# Patient Record
Sex: FEMALE | Race: WHITE | NOT HISPANIC OR LATINO | Employment: OTHER | URBAN - METROPOLITAN AREA
[De-identification: names, ages, dates, MRNs, and addresses within clinical notes are randomized per-mention and may not be internally consistent; named-entity substitution may affect disease eponyms.]

---

## 2017-03-15 RX ORDER — VERAPAMIL HYDROCHLORIDE 240 MG/1
TABLET, FILM COATED, EXTENDED RELEASE ORAL
Qty: 90 TABLET | Refills: 0 | Status: SHIPPED | OUTPATIENT
Start: 2017-03-15 | End: 2017-09-22 | Stop reason: SDUPTHER

## 2017-03-15 RX ORDER — BACLOFEN 10 MG/1
TABLET ORAL
Qty: 180 TABLET | Refills: 0 | OUTPATIENT
Start: 2017-03-15

## 2017-03-15 RX ORDER — GABAPENTIN 800 MG/1
TABLET ORAL
Qty: 30 TABLET | Refills: 0 | Status: SHIPPED | OUTPATIENT
Start: 2017-03-15 | End: 2017-04-11 | Stop reason: SDUPTHER

## 2017-03-15 RX ORDER — LEVOTHYROXINE SODIUM 88 UG/1
TABLET ORAL
Qty: 45 TABLET | Refills: 0 | Status: SHIPPED | OUTPATIENT
Start: 2017-03-15 | End: 2017-09-22 | Stop reason: SDUPTHER

## 2017-03-15 RX ORDER — BACLOFEN 10 MG/1
TABLET ORAL
Qty: 60 TABLET | Refills: 0 | Status: SHIPPED | OUTPATIENT
Start: 2017-03-15 | End: 2017-04-14 | Stop reason: SDUPTHER

## 2017-03-15 NOTE — TELEPHONE ENCOUNTER
----- Message from Elmira Almonte sent at 3/15/2017  2:11 PM EDT -----  Contact: pt - Dr Mascorro's pt - RE: appt  [facility] for pt to return call to schedule pt for appt

## 2017-04-11 ENCOUNTER — OFFICE VISIT (OUTPATIENT)
Dept: INTERNAL MEDICINE | Facility: CLINIC | Age: 77
End: 2017-04-11

## 2017-04-11 VITALS
SYSTOLIC BLOOD PRESSURE: 136 MMHG | HEIGHT: 61 IN | WEIGHT: 215 LBS | DIASTOLIC BLOOD PRESSURE: 84 MMHG | BODY MASS INDEX: 40.59 KG/M2

## 2017-04-11 DIAGNOSIS — M81.0 OSTEOPOROSIS: ICD-10-CM

## 2017-04-11 DIAGNOSIS — E03.9 ACQUIRED HYPOTHYROIDISM: ICD-10-CM

## 2017-04-11 DIAGNOSIS — F51.01 PRIMARY INSOMNIA: ICD-10-CM

## 2017-04-11 DIAGNOSIS — F39 MOOD DISORDER (HCC): ICD-10-CM

## 2017-04-11 DIAGNOSIS — E55.9 VITAMIN D DEFICIENCY: ICD-10-CM

## 2017-04-11 DIAGNOSIS — I10 ESSENTIAL HYPERTENSION: Primary | ICD-10-CM

## 2017-04-11 DIAGNOSIS — G89.29 CHRONIC RIGHT-SIDED LOW BACK PAIN WITHOUT SCIATICA: ICD-10-CM

## 2017-04-11 DIAGNOSIS — L30.9 DERMATITIS: ICD-10-CM

## 2017-04-11 DIAGNOSIS — M54.50 CHRONIC RIGHT-SIDED LOW BACK PAIN WITHOUT SCIATICA: ICD-10-CM

## 2017-04-11 PROBLEM — M54.17 LUMBOSACRAL NEURITIS: Status: ACTIVE | Noted: 2017-04-11

## 2017-04-11 PROBLEM — M51.36 DDD (DEGENERATIVE DISC DISEASE), LUMBAR: Status: ACTIVE | Noted: 2017-04-11

## 2017-04-11 PROBLEM — M48.061 LUMBAR CANAL STENOSIS: Status: ACTIVE | Noted: 2017-04-11

## 2017-04-11 PROBLEM — M51.369 DDD (DEGENERATIVE DISC DISEASE), LUMBAR: Status: ACTIVE | Noted: 2017-04-11

## 2017-04-11 PROCEDURE — 99214 OFFICE O/P EST MOD 30 MIN: CPT | Performed by: INTERNAL MEDICINE

## 2017-04-11 RX ORDER — ESCITALOPRAM OXALATE 10 MG/1
10 TABLET ORAL DAILY
Qty: 90 TABLET | Refills: 3 | Status: SHIPPED | OUTPATIENT
Start: 2017-04-11 | End: 2018-01-18 | Stop reason: SDUPTHER

## 2017-04-11 RX ORDER — GABAPENTIN 800 MG/1
800 TABLET ORAL 2 TIMES DAILY
Qty: 30 TABLET | Refills: 0 | Status: SHIPPED | OUTPATIENT
Start: 2017-04-11 | End: 2018-01-18

## 2017-04-11 RX ORDER — DESOXIMETASONE 0.5 MG/G
CREAM TOPICAL 2 TIMES DAILY
Qty: 60 BOTTLE | Refills: 2 | Status: SHIPPED | OUTPATIENT
Start: 2017-04-11 | End: 2019-05-24

## 2017-04-11 NOTE — PROGRESS NOTES
"Subjective   Dee Mac is a 76 y.o. female here for   Chief Complaint   Patient presents with   • Hypertension     1.5 year follow-up   • Hypothyroidism     1.5 year follow-up   • Hyperlipidemia     1.5 year follow-up   .    Vitals:    04/11/17 1338   BP: 136/84   BP Location: Right arm   Patient Position: Sitting   Cuff Size: Large Adult   Weight: 215 lb (97.5 kg)   Height: 61.25\" (155.6 cm)       Hypertension   This is a chronic problem. The current episode started more than 1 year ago. The problem is unchanged. The problem is controlled. Associated symptoms include malaise/fatigue and shortness of breath. Pertinent negatives include no chest pain or palpitations.   Hypothyroidism   This is a chronic problem. The current episode started more than 1 year ago. The problem occurs constantly. The problem has been unchanged. Associated symptoms include fatigue. Pertinent negatives include no abdominal pain, chest pain, chills, coughing, fever, numbness or weakness.   Hyperlipidemia   This is a chronic problem. The current episode started more than 1 year ago. The problem is controlled. Recent lipid tests were reviewed and are normal. Exacerbating diseases include hypothyroidism. She has no history of diabetes. Associated symptoms include shortness of breath. Pertinent negatives include no chest pain.   Back Pain   This is a chronic problem. The current episode started more than 1 year ago. The problem occurs constantly. The problem is unchanged. The pain is present in the lumbar spine. The quality of the pain is described as aching, stabbing and shooting. The pain does not radiate. The pain is severe. The pain is the same all the time. Pertinent negatives include no abdominal pain, bladder incontinence, bowel incontinence, chest pain, dysuria, fever, numbness, pelvic pain, tingling or weakness.        Encounter Diagnoses   Name Primary?   • Essential hypertension Yes   • Acquired hypothyroidism    • Chronic " right-sided low back pain without sciatica    • Mood disorder    • Dermatitis    • Osteoporosis    • Vitamin D deficiency    • Primary insomnia        The following portions of the patient's history were reviewed and updated as appropriate: allergies, current medications, past social history and problem list.    Review of Systems   Constitutional: Positive for fatigue and malaise/fatigue. Negative for chills and fever.   Respiratory: Positive for shortness of breath. Negative for cough and wheezing.    Cardiovascular: Negative for chest pain, palpitations and leg swelling.   Gastrointestinal: Negative for abdominal pain and bowel incontinence.   Genitourinary: Negative for bladder incontinence, dysuria and pelvic pain.   Musculoskeletal: Positive for back pain.   Neurological: Negative for tingling, weakness and numbness.   Psychiatric/Behavioral: Positive for dysphoric mood and sleep disturbance. The patient is nervous/anxious.        Objective   Physical Exam   Constitutional: She appears well-developed and well-nourished. No distress.   Cardiovascular: Normal rate, regular rhythm and normal heart sounds.    Pulmonary/Chest: No respiratory distress. She has no wheezes. She has no rales. She exhibits no tenderness.   Musculoskeletal: She exhibits no edema.   Psychiatric: She has a normal mood and affect. Her behavior is normal.   Nursing note and vitals reviewed.    Gait ok.  Decreased ROM of low back b/c pain.  Normal strength & sensation.  +dry scaly skin of scalp.    Assessment/Plan   Problem List Items Addressed This Visit        Unprioritized    Chronic right-sided low back pain without sciatica    Relevant Medications    gabapentin (NEURONTIN) 800 MG tablet    Hypothyroidism    Hypertension - Primary    Mood disorder    Relevant Medications    escitalopram (LEXAPRO) 10 MG tablet    Vitamin D deficiency    Osteoporosis    Insomnia      Other Visit Diagnoses     Dermatitis        Relevant Medications     desoximetasone (TOPICORT) 0.05 % cream       LBP - need to slowly increase gabapentin from 400mg qhs to adding 200mg in afternoon, then 200mg bid if not too tired (no driving if sleepy).  May slowly increase gabapentin to total of 1600mg daily.  Call if problems.   Fatigue from chronic pain?  Need to return for fasting labs.   Dermatitis of scalp - trial of steroid cream bid prn.   Mood worse since running out of lexapro.  Need to restart lexapro & call if sx persist.   Return 1 mo.     Discussed the need for regular f/u here!!

## 2017-04-14 RX ORDER — BACLOFEN 10 MG/1
TABLET ORAL
Qty: 60 TABLET | Refills: 0 | Status: SHIPPED | OUTPATIENT
Start: 2017-04-14 | End: 2018-01-18

## 2017-04-28 ENCOUNTER — TELEPHONE (OUTPATIENT)
Dept: INTERNAL MEDICINE | Facility: CLINIC | Age: 77
End: 2017-04-28

## 2017-04-28 DIAGNOSIS — I10 BENIGN ESSENTIAL HTN: ICD-10-CM

## 2017-04-28 DIAGNOSIS — E78.5 HYPERLIPIDEMIA, ACQUIRED: Primary | ICD-10-CM

## 2017-04-28 DIAGNOSIS — E03.9 ACQUIRED HYPOTHYROIDISM: ICD-10-CM

## 2017-09-20 ENCOUNTER — LAB (OUTPATIENT)
Dept: INTERNAL MEDICINE | Facility: CLINIC | Age: 77
End: 2017-09-20

## 2017-09-20 DIAGNOSIS — E03.9 ACQUIRED HYPOTHYROIDISM: ICD-10-CM

## 2017-09-20 DIAGNOSIS — I10 BENIGN ESSENTIAL HTN: ICD-10-CM

## 2017-09-20 DIAGNOSIS — E78.5 HYPERLIPIDEMIA, ACQUIRED: ICD-10-CM

## 2017-09-20 LAB
ALBUMIN SERPL-MCNC: 4.3 G/DL (ref 3.5–5.2)
ALBUMIN/GLOB SERPL: 1.6 G/DL
ALP SERPL-CCNC: 59 U/L (ref 39–117)
ALT SERPL-CCNC: 13 U/L (ref 1–33)
AST SERPL-CCNC: 13 U/L (ref 1–32)
BASOPHILS # BLD AUTO: 0.03 10*3/MM3 (ref 0–0.2)
BASOPHILS NFR BLD AUTO: 0.4 % (ref 0–1.5)
BILIRUB SERPL-MCNC: 0.3 MG/DL (ref 0.1–1.2)
BUN SERPL-MCNC: 25 MG/DL (ref 8–23)
BUN/CREAT SERPL: 22.7 (ref 7–25)
CALCIUM SERPL-MCNC: 10.6 MG/DL (ref 8.6–10.5)
CHLORIDE SERPL-SCNC: 103 MMOL/L (ref 98–107)
CHOLEST SERPL-MCNC: 180 MG/DL (ref 0–200)
CO2 SERPL-SCNC: 24.3 MMOL/L (ref 22–29)
CREAT SERPL-MCNC: 1.1 MG/DL (ref 0.57–1)
EOSINOPHIL # BLD AUTO: 0.2 10*3/MM3 (ref 0–0.7)
EOSINOPHIL # BLD AUTO: 2.6 % (ref 0.3–6.2)
ERYTHROCYTE [DISTWIDTH] IN BLOOD BY AUTOMATED COUNT: 14.8 % (ref 11.7–13)
GLOBULIN SER CALC-MCNC: 2.7 GM/DL
GLUCOSE SERPL-MCNC: 100 MG/DL (ref 65–99)
HCT VFR BLD AUTO: 42.8 % (ref 35.6–45.5)
HDLC SERPL-MCNC: 38 MG/DL (ref 40–60)
HGB BLD-MCNC: 13.9 G/DL (ref 11.9–15.5)
IMM GRANULOCYTES # BLD: 0.05 10*3/MM3 (ref 0–0.03)
IMM GRANULOCYTES NFR BLD: 0.6 % (ref 0–0.5)
LDLC SERPL CALC-MCNC: 116 MG/DL (ref 0–100)
LYMPHOCYTES # BLD AUTO: 1.99 10*3/MM3 (ref 0.9–4.8)
LYMPHOCYTES NFR BLD AUTO: 25.4 % (ref 19.6–45.3)
MCH RBC QN AUTO: 29.8 PG (ref 26.9–32)
MCHC RBC AUTO-ENTMCNC: 32.5 G/DL (ref 32.4–36.3)
MCV RBC AUTO: 91.6 FL (ref 80.5–98.2)
MONOCYTES # BLD AUTO: 0.63 10*3/MM3 (ref 0.2–1.2)
MONOCYTES NFR BLD AUTO: 8 % (ref 5–12)
NEUTROPHILS # BLD AUTO: 4.93 10*3/MM3 (ref 1.9–8.1)
NEUTROPHILS NFR BLD AUTO: 63 % (ref 42.7–76)
NRBC BLD AUTO-RTO: 0 /100 WBC (ref 0–0)
PLATELET # BLD AUTO: 210 10*3/MM3 (ref 140–500)
POTASSIUM SERPL-SCNC: 4.2 MMOL/L (ref 3.5–5.2)
PROT SERPL-MCNC: 7 G/DL (ref 6–8.5)
RBC # BLD AUTO: 4.67 10*6/MM3 (ref 3.9–5.2)
SODIUM SERPL-SCNC: 140 MMOL/L (ref 136–145)
TRIGL SERPL-MCNC: 129 MG/DL (ref 0–150)
TSH SERPL-ACNC: 7.21 MIU/ML (ref 0.27–4.2)
VLDLC SERPL-MCNC: 25.8 MG/DL (ref 5–40)
WBC # BLD AUTO: 7.83 10*3/MM3 (ref 4.5–10.7)

## 2017-09-22 DIAGNOSIS — E03.9 HYPOTHYROIDISM: ICD-10-CM

## 2017-09-22 RX ORDER — VALSARTAN AND HYDROCHLOROTHIAZIDE 160; 25 MG/1; MG/1
TABLET ORAL
Qty: 90 TABLET | Refills: 0 | Status: SHIPPED | OUTPATIENT
Start: 2017-09-22 | End: 2017-10-25 | Stop reason: SDUPTHER

## 2017-09-22 RX ORDER — VERAPAMIL HYDROCHLORIDE 240 MG/1
TABLET, FILM COATED, EXTENDED RELEASE ORAL
Qty: 90 TABLET | Refills: 0 | Status: SHIPPED | OUTPATIENT
Start: 2017-09-22 | End: 2018-01-18 | Stop reason: SDUPTHER

## 2017-09-22 RX ORDER — LEVOTHYROXINE SODIUM 88 UG/1
TABLET ORAL
Qty: 45 TABLET | Refills: 0 | Status: SHIPPED | OUTPATIENT
Start: 2017-09-22 | End: 2017-09-27

## 2017-09-22 RX ORDER — LEVOTHYROXINE SODIUM 0.1 MG/1
TABLET ORAL
Qty: 45 TABLET | Refills: 0 | Status: SHIPPED | OUTPATIENT
Start: 2017-09-22 | End: 2017-09-27 | Stop reason: SDUPTHER

## 2017-09-27 DIAGNOSIS — E03.9 ACQUIRED HYPOTHYROIDISM: ICD-10-CM

## 2017-09-27 RX ORDER — LEVOTHYROXINE SODIUM 0.1 MG/1
100 TABLET ORAL DAILY
Qty: 90 TABLET | Refills: 2 | Status: SHIPPED | OUTPATIENT
Start: 2017-09-27 | End: 2018-01-18 | Stop reason: SDUPTHER

## 2017-09-27 NOTE — PROGRESS NOTES
High sugar/cholesterol/fat - need low fat/sugar diet & more exercise. Abnormal kidney test - need to increase water intake & avoid ibuprofen, etc.  Only tylenol does not affect the kidneys.   Thyroid is low - if really taking thyroid pill daily (alternating 88 mcg with 100 mcg every other day), then now take 100 mcg every day.  Need recheck TSH in several mos.

## 2017-10-09 ENCOUNTER — TELEPHONE (OUTPATIENT)
Dept: INTERNAL MEDICINE | Facility: CLINIC | Age: 77
End: 2017-10-09

## 2017-10-09 DIAGNOSIS — R21 RASH: Primary | ICD-10-CM

## 2017-10-09 NOTE — TELEPHONE ENCOUNTER
----- Message from Susanne Beckett sent at 10/9/2017 11:12 AM EDT -----  Pt calling about a rash she has under her breasts and under the apron fold she has on her abdomen.  She says she has had this before and was given a cream. Pt unsure what it is called. Rx not in EPIC.   She requests the large tub.  Please Advise    Pt# [phone]    [facility] [address] AT [place] [phone] [place] [phone]

## 2017-10-09 NOTE — TELEPHONE ENCOUNTER
Patient is requesting a prescription for a rash that she currently has.    Please review message and advise.    Thank you

## 2017-10-25 RX ORDER — VALSARTAN AND HYDROCHLOROTHIAZIDE 160; 25 MG/1; MG/1
TABLET ORAL
Qty: 90 TABLET | Refills: 1 | Status: SHIPPED | OUTPATIENT
Start: 2017-10-25 | End: 2018-01-18 | Stop reason: SDUPTHER

## 2018-01-18 ENCOUNTER — OFFICE VISIT (OUTPATIENT)
Dept: INTERNAL MEDICINE | Facility: CLINIC | Age: 78
End: 2018-01-18

## 2018-01-18 VITALS
WEIGHT: 202.6 LBS | BODY MASS INDEX: 38.25 KG/M2 | OXYGEN SATURATION: 98 % | HEART RATE: 82 BPM | DIASTOLIC BLOOD PRESSURE: 90 MMHG | SYSTOLIC BLOOD PRESSURE: 150 MMHG | RESPIRATION RATE: 17 BRPM | TEMPERATURE: 98.1 F | HEIGHT: 61 IN

## 2018-01-18 DIAGNOSIS — R32 INCONTINENCE IN FEMALE: ICD-10-CM

## 2018-01-18 DIAGNOSIS — K58.0 IRRITABLE BOWEL SYNDROME WITH DIARRHEA: ICD-10-CM

## 2018-01-18 DIAGNOSIS — Z78.0 MENOPAUSE: ICD-10-CM

## 2018-01-18 DIAGNOSIS — F39 MOOD DISORDER (HCC): ICD-10-CM

## 2018-01-18 DIAGNOSIS — M81.0 OSTEOPOROSIS, UNSPECIFIED OSTEOPOROSIS TYPE, UNSPECIFIED PATHOLOGICAL FRACTURE PRESENCE: ICD-10-CM

## 2018-01-18 DIAGNOSIS — E03.9 ACQUIRED HYPOTHYROIDISM: ICD-10-CM

## 2018-01-18 DIAGNOSIS — Z00.00 MEDICARE ANNUAL WELLNESS VISIT, SUBSEQUENT: ICD-10-CM

## 2018-01-18 DIAGNOSIS — I10 ESSENTIAL HYPERTENSION: Primary | ICD-10-CM

## 2018-01-18 DIAGNOSIS — G89.29 CHRONIC RIGHT-SIDED LOW BACK PAIN WITHOUT SCIATICA: ICD-10-CM

## 2018-01-18 DIAGNOSIS — N64.4 CHRONIC BREAST PAIN: ICD-10-CM

## 2018-01-18 DIAGNOSIS — K21.9 GASTROESOPHAGEAL REFLUX DISEASE, ESOPHAGITIS PRESENCE NOT SPECIFIED: ICD-10-CM

## 2018-01-18 DIAGNOSIS — M25.551 PAIN OF RIGHT HIP JOINT: ICD-10-CM

## 2018-01-18 DIAGNOSIS — G89.29 CHRONIC BREAST PAIN: ICD-10-CM

## 2018-01-18 DIAGNOSIS — M54.50 CHRONIC RIGHT-SIDED LOW BACK PAIN WITHOUT SCIATICA: ICD-10-CM

## 2018-01-18 LAB
ALBUMIN SERPL-MCNC: 4.4 G/DL (ref 3.5–5.2)
ALBUMIN/GLOB SERPL: 1.4 G/DL
ALP SERPL-CCNC: 66 U/L (ref 39–117)
ALT SERPL-CCNC: 15 U/L (ref 1–33)
AST SERPL-CCNC: 13 U/L (ref 1–32)
BILIRUB SERPL-MCNC: 0.3 MG/DL (ref 0.1–1.2)
BUN SERPL-MCNC: 20 MG/DL (ref 8–23)
BUN/CREAT SERPL: 18.9 (ref 7–25)
CALCIUM SERPL-MCNC: 10.5 MG/DL (ref 8.6–10.5)
CHLORIDE SERPL-SCNC: 103 MMOL/L (ref 98–107)
CO2 SERPL-SCNC: 23.4 MMOL/L (ref 22–29)
CREAT SERPL-MCNC: 1.06 MG/DL (ref 0.57–1)
GLOBULIN SER CALC-MCNC: 3.2 GM/DL
GLUCOSE SERPL-MCNC: 91 MG/DL (ref 65–99)
POTASSIUM SERPL-SCNC: 4.3 MMOL/L (ref 3.5–5.2)
PROT SERPL-MCNC: 7.6 G/DL (ref 6–8.5)
SODIUM SERPL-SCNC: 140 MMOL/L (ref 136–145)
TSH SERPL-ACNC: 4.63 MIU/ML (ref 0.27–4.2)

## 2018-01-18 PROCEDURE — G0439 PPPS, SUBSEQ VISIT: HCPCS | Performed by: INTERNAL MEDICINE

## 2018-01-18 PROCEDURE — 36415 COLL VENOUS BLD VENIPUNCTURE: CPT | Performed by: INTERNAL MEDICINE

## 2018-01-18 PROCEDURE — 99214 OFFICE O/P EST MOD 30 MIN: CPT | Performed by: INTERNAL MEDICINE

## 2018-01-18 PROCEDURE — 96160 PT-FOCUSED HLTH RISK ASSMT: CPT | Performed by: INTERNAL MEDICINE

## 2018-01-18 RX ORDER — LEVOTHYROXINE SODIUM 0.1 MG/1
100 TABLET ORAL DAILY
Qty: 90 TABLET | Refills: 3 | Status: SHIPPED | OUTPATIENT
Start: 2018-01-18 | End: 2019-01-28 | Stop reason: SDUPTHER

## 2018-01-18 RX ORDER — VERAPAMIL HYDROCHLORIDE 240 MG/1
240 TABLET, FILM COATED, EXTENDED RELEASE ORAL DAILY
Qty: 90 TABLET | Refills: 3 | Status: SHIPPED | OUTPATIENT
Start: 2018-01-18 | End: 2019-12-05 | Stop reason: SDUPTHER

## 2018-01-18 RX ORDER — VALSARTAN AND HYDROCHLOROTHIAZIDE 160; 25 MG/1; MG/1
1 TABLET ORAL DAILY
Qty: 90 TABLET | Refills: 3 | Status: SHIPPED | OUTPATIENT
Start: 2018-01-18 | End: 2019-12-05 | Stop reason: SDUPTHER

## 2018-01-18 RX ORDER — RALOXIFENE HYDROCHLORIDE 60 MG/1
60 TABLET, FILM COATED ORAL DAILY
Qty: 90 TABLET | Refills: 3 | Status: SHIPPED | OUTPATIENT
Start: 2018-01-18 | End: 2019-12-05 | Stop reason: SDUPTHER

## 2018-01-18 RX ORDER — BACLOFEN 20 MG/1
10 TABLET ORAL 3 TIMES DAILY PRN
Qty: 270 TABLET | Refills: 1 | Status: SHIPPED | OUTPATIENT
Start: 2018-01-18 | End: 2019-05-24

## 2018-01-18 RX ORDER — GABAPENTIN 800 MG/1
800 TABLET ORAL DAILY
Qty: 90 TABLET | Refills: 1 | Status: SHIPPED | OUTPATIENT
Start: 2018-01-18 | End: 2018-06-22 | Stop reason: SDUPTHER

## 2018-01-18 RX ORDER — ESCITALOPRAM OXALATE 10 MG/1
TABLET ORAL
Qty: 135 TABLET | Refills: 3 | Status: SHIPPED | OUTPATIENT
Start: 2018-01-18 | End: 2019-12-05 | Stop reason: SDUPTHER

## 2018-01-18 NOTE — PATIENT INSTRUCTIONS
"  Medicare Wellness  Personal Prevention Plan of Service     Date of Office Visit:  2018  Encounter Provider:  Kira Mascorro MD  Place of Service:  [facility]  Patient Name: Dee Watts  :  1940    As part of the Medicare Wellness portion of your visit today, we are providing you with this personalized preventive plan of services (PPPS). This plan is based upon recommendations of the [place] Preventive Services Task Force (USPSTF) and the Advisory Committee on Immunization Practices (ACIP).    This lists the preventive care services that should be considered, and provides dates of when you are due. Items listed as completed are up-to-date and do not require any further intervention.    Health Maintenance   Topic Date Due   • PNEUMOCOCCAL VACCINES (65+ LOW/MEDIUM RISK) (1 of 2 - PCV13) 2003   • DXA SCAN  2017   • LIPID PANEL  2018   • MEDICARE ANNUAL WELLNESS  2019   • TDAP/TD VACCINES (2 - Td) 2022   • INFLUENZA VACCINE  Completed   • ZOSTER VACCINE  Completed       Orders Placed This Encounter   Procedures   • DEXA Bone Density Axial     Standing Status:   Future     Standing Expiration Date:   2019     Order Specific Question:   Reason for Exam:     Answer:   menopause   • Mammo Diagnostic Bilateral With CAD     Standing Status:   Future     Standing Expiration Date:   2019     Order Specific Question:   Is an Ultrasound Breast required?  If 'Yes\", Please enter an order for the US Breast as well.     Answer:   Yes     Order Specific Question:   Reason for Exam:     Answer:   pain   • US Breast Bilateral Complete     Standing Status:   Future     Standing Expiration Date:   2019     Scheduling Instructions:      [facility]     Order Specific Question:   Reason for Exam:     Answer:   breast lumps   • Comprehensive Metabolic Panel     Standing Status:   Future     Number of Occurrences:   1     Standing Expiration " Date:   1/18/2019   • TSH     Standing Status:   Future     Number of Occurrences:   1     Standing Expiration Date:   1/18/2019   • Ambulatory Referral to Orthopedic Surgery     Referral Priority:   Routine     Referral Type:   Consultation     Referral Reason:   Specialty Services Required     Referred to Provider:   Pedro yS MD     Requested Specialty:   Orthopedic Surgery     Number of Visits Requested:   1       Return in about 2 months (around 3/18/2018) for Recheck.

## 2018-01-18 NOTE — PROGRESS NOTES
QUICK REFERENCE INFORMATION:  The ABCs of the Annual Wellness Visit    Subsequent Medicare Wellness Visit    HEALTH RISK ASSESSMENT    1940    Recent Hospitalizations:  No hospitalization(s) within the last year..        Current Medical Providers:  Patient Care Team:  Kira Mascorro MD as PCP - General  Pedro Sy MD as Surgeon (Orthopedic Surgery)  Keith Dorsey MD as Surgeon (Orthopedic Surgery)  Kira Beal MD (Dermatology)        Smoking Status:  History   Smoking Status   • Former Smoker   • Quit date: 1/1/1990   Smokeless Tobacco   • Never Used       Alcohol Consumption:  History   Alcohol Use   • Yes     Comment: 1 DRINK PER WEEK       Depression Screen:   PHQ-2/PHQ-9 Depression Screening 1/18/2018   Little interest or pleasure in doing things 0   Feeling down, depressed, or hopeless 0   Trouble falling or staying asleep, or sleeping too much 0   Feeling tired or having little energy 0   Poor appetite or overeating 0   Feeling bad about yourself - or that you are a failure or have let yourself or your family down 0   Trouble concentrating on things, such as reading the newspaper or watching television 0   Moving or speaking so slowly that other people could have noticed. Or the opposite - being so fidgety or restless that you have been moving around a lot more than usual 0   Thoughts that you would be better off dead, or of hurting yourself in some way 0   Total Score 0       Health Habits and Functional and Cognitive Screening:  Functional & Cognitive Status 1/18/2018   Do you have difficulty preparing food and eating? No   Do you have difficulty bathing yourself, getting dressed or grooming yourself? No   Do you have difficulty using the toilet? No   Do you have difficulty moving around from place to place? Yes   Do you have trouble with steps or getting out of a bed or a chair? Yes   In the past year have you fallen or experienced a near fall? Yes   Do you need help using the phone?  No    Are you deaf or do you have serious difficulty hearing?  No   Do you need help with transportation? No   Do you need help shopping? No   Do you need help preparing meals?  No   Do you need help with housework?  Yes   Do you need help with laundry? Yes   Do you need help taking your medications? No   Do you need help managing money? No   Do you have difficulty concentrating, remembering or making decisions? Yes           Does the patient have evidence of cognitive impairment? No    Aspirin use counseling: Does not need ASA (and currently is not on it)      Recent Lab Results:  CMP:  Lab Results   Component Value Date     (H) 09/20/2017    BUN 25 (H) 09/20/2017    CREATININE 1.10 (H) 09/20/2017    EGFRIFNONA 48 (L) 09/20/2017    EGFRIFAFRI 58 (L) 09/20/2017    BCR 22.7 09/20/2017     09/20/2017    K 4.2 09/20/2017    CO2 24.3 09/20/2017    CALCIUM 10.6 (H) 09/20/2017    PROTENTOTREF 7.0 09/20/2017    ALBUMIN 4.30 09/20/2017    LABGLOBREF 2.7 09/20/2017    LABIL2 1.6 09/20/2017    BILITOT 0.3 09/20/2017    ALKPHOS 59 09/20/2017    AST 13 09/20/2017    ALT 13 09/20/2017     Lipid Panel:  Lab Results   Component Value Date    TRIG 129 09/20/2017    HDL 38 (L) 09/20/2017    VLDL 25.8 09/20/2017     HbA1c:       Visual Acuity:  No exam data present    Age-appropriate Screening Schedule:  Refer to the list below for future screening recommendations based on patient's age, sex and/or medical conditions. Orders for these recommended tests are listed in the plan section. The patient has been provided with a written plan.    Health Maintenance   Topic Date Due   • PNEUMOCOCCAL VACCINES (65+ LOW/MEDIUM RISK) (1 of 2 - PCV13) 06/02/2003   • DXA SCAN  04/11/2017   • INFLUENZA VACCINE  08/01/2017   • LIPID PANEL  09/20/2018   • TDAP/TD VACCINES (2 - Td) 03/27/2022   • ZOSTER VACCINE  Completed        Subjective   History of Present Illness    Dee Watts is a 77 y.o. female who presents for an Subsequent  Wellness Visit.    The following portions of the patient's history were reviewed and updated as appropriate: allergies, current medications, past family history, past medical history, past social history, past surgical history and problem list.    Outpatient Medications Prior to Visit   Medication Sig Dispense Refill   • cetirizine (ZyrTEC) 10 MG tablet Take  by mouth.     • desoximetasone (TOPICORT) 0.05 % cream Apply  topically 2 (Two) Times a Day. 60 bottle 2   • escitalopram (LEXAPRO) 10 MG tablet Take 1 tablet by mouth Daily. 90 tablet 3   • levothyroxine (SYNTHROID, LEVOTHROID) 100 MCG tablet Take 1 tablet by mouth Daily. 90 tablet 2   • nystatin-triamcinolone (MYCOLOG II) 075140-7.1 UNIT/GM-% cream Apply  topically 2 (Two) Times a Day. 60 g 1   • raloxifene (EVISTA) 60 MG tablet Take 60 mg by mouth daily.     • valsartan-hydrochlorothiazide (DIOVAN-HCT) 160-25 MG per tablet TAKE 1 TABLET BY MOUTH EVERY DAY 90 tablet 1   • verapamil SR (CALAN-SR) 240 MG CR tablet TAKE 1 TABLET BY MOUTH DAILY 90 tablet 0   • baclofen (LIORESAL) 10 MG tablet TAKE 1 TABLET BY MOUTH TWICE DAILY AS NEEDED FOR MUSCLE SPASMS 60 tablet 0   • gabapentin (NEURONTIN) 800 MG tablet Take 1 tablet by mouth 2 (Two) Times a Day. 30 tablet 0   • naproxen sodium (ALEVE) 220 MG tablet Take 220 mg by mouth 2 (two) times a day as needed for mild pain (1-3).     • valsartan (DIOVAN) 40 MG tablet Take 40 mg by mouth Daily.       No facility-administered medications prior to visit.        Patient Active Problem List   Diagnosis   • DDD (degenerative disc disease), lumbar   • Osteoarthritis of hip   • Adult body mass index greater than 30   • Lumbosacral neuritis   • Lumbar canal stenosis   • Chronic right-sided low back pain without sciatica   • Hypothyroidism   • Hypertension   • Mood disorder   • Vitamin D deficiency   • IBS (irritable bowel syndrome)   • Osteoporosis   • Migraines   • GERD (gastroesophageal reflux disease)   • Insomnia  "      Advance Care Planning:  has an advance directive - a copy has been provided and is in file    Identification of Risk Factors:  Risk factors include: weight , unhealthy diet, cardiovascular risk, inactivity, increased fall risk, chronic pain and incontinence.    Review of Systems    Compared to one year ago, the patient feels her physical health is better.  Compared to one year ago, the patient feels her mental health is better.    Objective     Physical Exam    Vitals:    01/18/18 1318   BP: 152/100   BP Location: Left arm   Patient Position: Sitting   Cuff Size: Large Adult   Pulse: 82   Resp: 17   Temp: 98.1 °F (36.7 °C)   TempSrc: Temporal Artery    SpO2: 98%   Weight: 91.9 kg (202 lb 9.6 oz)   Height: 154.3 cm (60.75\")   PainSc:   5   PainLoc: Hip  Comment: Right       Body mass index is 38.6 kg/(m^2).  Discussed the patient's BMI with her. BMI is above normal parameters. Follow-up plan includes:  exercise counseling and nutrition counseling.    Assessment/Plan   Patient Self-Management and Personalized Health Advice  The patient has been provided with information about: diet, exercise, weight management, prevention of cardiac or vascular disease, the relationship between weight and GERD and fall prevention and preventive services including:   · Bone densitometry screening, Counseling for cardiovascular disease risk reduction, Diabetes screening, see lab orders, Exercise counseling provided, Fall Risk assessment done, Fall Risk plan of care done, Influenza vaccine.    Visit Diagnoses:  No diagnosis found.    No orders of the defined types were placed in this encounter.      Outpatient Encounter Prescriptions as of 1/18/2018   Medication Sig Dispense Refill   • cetirizine (ZyrTEC) 10 MG tablet Take  by mouth.     • desoximetasone (TOPICORT) 0.05 % cream Apply  topically 2 (Two) Times a Day. 60 bottle 2   • escitalopram (LEXAPRO) 10 MG tablet Take 1 tablet by mouth Daily. 90 tablet 3   • levothyroxine " (SYNTHROID, LEVOTHROID) 100 MCG tablet Take 1 tablet by mouth Daily. 90 tablet 2   • nystatin-triamcinolone (MYCOLOG II) 914094-4.1 UNIT/GM-% cream Apply  topically 2 (Two) Times a Day. 60 g 1   • raloxifene (EVISTA) 60 MG tablet Take 60 mg by mouth daily.     • valsartan-hydrochlorothiazide (DIOVAN-HCT) 160-25 MG per tablet TAKE 1 TABLET BY MOUTH EVERY DAY 90 tablet 1   • verapamil SR (CALAN-SR) 240 MG CR tablet TAKE 1 TABLET BY MOUTH DAILY 90 tablet 0   • [DISCONTINUED] baclofen (LIORESAL) 10 MG tablet TAKE 1 TABLET BY MOUTH TWICE DAILY AS NEEDED FOR MUSCLE SPASMS 60 tablet 0   • [DISCONTINUED] gabapentin (NEURONTIN) 800 MG tablet Take 1 tablet by mouth 2 (Two) Times a Day. 30 tablet 0   • [DISCONTINUED] naproxen sodium (ALEVE) 220 MG tablet Take 220 mg by mouth 2 (two) times a day as needed for mild pain (1-3).     • [DISCONTINUED] valsartan (DIOVAN) 40 MG tablet Take 40 mg by mouth Daily.       No facility-administered encounter medications on file as of 1/18/2018.        Reviewed use of high risk medication in the elderly: yes  Reviewed for potential of harmful drug interactions in the elderly: yes    Follow Up:  No Follow-up on file.     An After Visit Summary and PPPS with all of these plans were given to the patient.

## 2018-01-24 ENCOUNTER — HOSPITAL ENCOUNTER (OUTPATIENT)
Dept: MAMMOGRAPHY | Facility: HOSPITAL | Age: 78
Discharge: HOME | End: 2018-01-24
Attending: INTERNAL MEDICINE | Admitting: INTERNAL MEDICINE

## 2018-01-24 ENCOUNTER — HOSPITAL ENCOUNTER (OUTPATIENT)
Dept: ULTRASOUND IMAGING | Facility: HOSPITAL | Age: 78
Discharge: HOME | End: 2018-01-24
Attending: INTERNAL MEDICINE

## 2018-01-24 DIAGNOSIS — N64.4 CHRONIC BREAST PAIN: ICD-10-CM

## 2018-01-24 DIAGNOSIS — G89.29 CHRONIC BREAST PAIN: ICD-10-CM

## 2018-01-24 PROCEDURE — 77066 DX MAMMO INCL CAD BI: CPT

## 2018-01-24 PROCEDURE — 76642 ULTRASOUND BREAST LIMITED: CPT

## 2018-01-31 ENCOUNTER — OFFICE VISIT (OUTPATIENT)
Dept: ORTHOPEDIC SURGERY | Facility: CLINIC | Age: 78
End: 2018-01-31

## 2018-01-31 VITALS — HEIGHT: 62 IN | BODY MASS INDEX: 37.17 KG/M2 | WEIGHT: 202 LBS

## 2018-01-31 DIAGNOSIS — M25.551 RIGHT HIP PAIN: Primary | ICD-10-CM

## 2018-01-31 DIAGNOSIS — Z96.641 STATUS POST TOTAL HIP REPLACEMENT, RIGHT: ICD-10-CM

## 2018-01-31 PROCEDURE — 99213 OFFICE O/P EST LOW 20 MIN: CPT | Performed by: ORTHOPAEDIC SURGERY

## 2018-01-31 PROCEDURE — 73502 X-RAY EXAM HIP UNI 2-3 VIEWS: CPT | Performed by: ORTHOPAEDIC SURGERY

## 2018-01-31 PROCEDURE — 20610 DRAIN/INJ JOINT/BURSA W/O US: CPT | Performed by: ORTHOPAEDIC SURGERY

## 2018-01-31 RX ADMIN — LIDOCAINE HYDROCHLORIDE 2 ML: 10 INJECTION, SOLUTION EPIDURAL; INFILTRATION; INTRACAUDAL; PERINEURAL at 13:36

## 2018-01-31 RX ADMIN — METHYLPREDNISOLONE ACETATE 160 MG: 80 INJECTION, SUSPENSION INTRA-ARTICULAR; INTRALESIONAL; INTRAMUSCULAR; SOFT TISSUE at 13:36

## 2018-02-06 ENCOUNTER — OFFICE VISIT (OUTPATIENT)
Dept: ORTHOPEDIC SURGERY | Facility: CLINIC | Age: 78
End: 2018-02-06

## 2018-02-06 VITALS — BODY MASS INDEX: 37.17 KG/M2 | HEIGHT: 62 IN | WEIGHT: 202 LBS | TEMPERATURE: 98.7 F

## 2018-02-06 DIAGNOSIS — M54.50 LUMBAR SPINE PAIN: Primary | ICD-10-CM

## 2018-02-06 DIAGNOSIS — Z98.1 HISTORY OF LUMBAR FUSION: ICD-10-CM

## 2018-02-06 PROCEDURE — 99213 OFFICE O/P EST LOW 20 MIN: CPT | Performed by: ORTHOPAEDIC SURGERY

## 2018-02-06 PROCEDURE — 72100 X-RAY EXAM L-S SPINE 2/3 VWS: CPT | Performed by: ORTHOPAEDIC SURGERY

## 2018-02-06 RX ORDER — LEVOTHYROXINE SODIUM 88 UG/1
88 TABLET ORAL DAILY
COMMUNITY
End: 2018-06-22

## 2018-02-06 NOTE — PROGRESS NOTES
New patient or new problem visit    Chief Complaint   Patient presents with   • Lumbar Spine - Establish Care       HPI: She complains of back pain occasional radiation of the right hip activity related worse with the sitting.  Years ago I performed an L4 to S1 fusion with instrumentation and she's been getting along with conservative care.  Have not seen her in 3 years.  A couple of her adult son said had recent serious health issues.    PFSH: See chart- reviewed    Review of Systems   Constitutional: Negative.  Negative for chills, fever and unexpected weight change.   HENT: Positive for congestion, ear pain, postnasal drip, sinus pain, sinus pressure, sneezing, trouble swallowing and voice change.    Eyes: Negative.  Negative for visual disturbance.   Respiratory: Positive for shortness of breath. Negative for cough.    Cardiovascular: Negative.  Negative for chest pain and leg swelling.   Gastrointestinal: Negative.  Negative for abdominal pain, nausea and vomiting.   Endocrine: Positive for cold intolerance. Negative for heat intolerance.   Genitourinary: Positive for frequency. Negative for difficulty urinating and urgency.   Musculoskeletal: Positive for back pain, gait problem, myalgias, neck pain and neck stiffness.   Skin: Negative.  Negative for rash and wound.   Allergic/Immunologic: Negative.  Negative for immunocompromised state.   Neurological: Positive for dizziness. Negative for weakness and numbness.   Hematological: Negative.  Does not bruise/bleed easily.   Psychiatric/Behavioral: Positive for sleep disturbance. Negative for dysphoric mood. The patient is nervous/anxious.        PE: Musculoskeletal:  Posture is scoliotic to coronal and sagittal inspection.  Motion appears differential.  The skin about the area is well-healed.  There is no palpable or visible deformity.  There is no local spasm.   There is some tenderness to percussion and palpation of the spine.      Neurologic:  In the bilateral  lower extremities there is no evidence of atrophy.  Motor function is undisturbed in quadriceps, EHL, and gastrocnemius      Sensation appears symmetrically intact to light touch   .  Reflexes are 2+ and symmetrical in the patellae and absent in the Achilles.  Clonus is absent..  Gait appears undisturbed.  SLR test negative      MEDICAL DECISION MAKING    XRAY: Plain film x-ray show solid fusion L4 to S1 and maintained scoliosis with no increase in deformity compared to prior films.    Other: n/a    Impression: Lumbar scoliosis.    Plan: Bony 9 we'll want to try to treat her conservatively.  I reassured her that it safe to live with this and recommended she remain active.  She's going to use a heel left I will see her back as needed

## 2018-02-08 PROBLEM — Z96.641 STATUS POST TOTAL HIP REPLACEMENT, RIGHT: Status: ACTIVE | Noted: 2018-02-08

## 2018-02-08 PROBLEM — M25.551 RIGHT HIP PAIN: Status: ACTIVE | Noted: 2018-02-08

## 2018-02-08 RX ORDER — METHYLPREDNISOLONE ACETATE 80 MG/ML
160 INJECTION, SUSPENSION INTRA-ARTICULAR; INTRALESIONAL; INTRAMUSCULAR; SOFT TISSUE
Status: COMPLETED | OUTPATIENT
Start: 2018-01-31 | End: 2018-01-31

## 2018-02-08 RX ORDER — LIDOCAINE HYDROCHLORIDE 10 MG/ML
2 INJECTION, SOLUTION EPIDURAL; INFILTRATION; INTRACAUDAL; PERINEURAL
Status: COMPLETED | OUTPATIENT
Start: 2018-01-31 | End: 2018-01-31

## 2018-02-21 ENCOUNTER — OFFICE VISIT (OUTPATIENT)
Dept: ORTHOPEDIC SURGERY | Facility: CLINIC | Age: 78
End: 2018-02-21

## 2018-02-21 VITALS — TEMPERATURE: 97.4 F | WEIGHT: 202 LBS | HEIGHT: 61 IN | BODY MASS INDEX: 38.14 KG/M2

## 2018-02-21 DIAGNOSIS — M48.062 SPINAL STENOSIS OF LUMBAR REGION WITH NEUROGENIC CLAUDICATION: ICD-10-CM

## 2018-02-21 DIAGNOSIS — M43.16 SPONDYLOLISTHESIS OF LUMBAR REGION: Primary | ICD-10-CM

## 2018-02-21 PROCEDURE — 99212 OFFICE O/P EST SF 10 MIN: CPT | Performed by: ORTHOPAEDIC SURGERY

## 2018-02-21 NOTE — PROGRESS NOTES
She complains of continued back pain but is much relieved with a heel lift which we recently prescribed.  Good strength on exam some numbness in the right side laterally in the leg.  She wants to try aqua therapy which is  Idea and the will call in a prescription for that I'll see her back as needed

## 2018-03-06 ENCOUNTER — TELEPHONE (OUTPATIENT)
Dept: ORTHOPEDIC SURGERY | Facility: CLINIC | Age: 78
End: 2018-03-06

## 2018-03-06 DIAGNOSIS — M43.16 SPONDYLOLISTHESIS OF LUMBAR REGION: Primary | ICD-10-CM

## 2018-03-06 DIAGNOSIS — M48.062 SPINAL STENOSIS OF LUMBAR REGION WITH NEUROGENIC CLAUDICATION: ICD-10-CM

## 2018-04-27 ENCOUNTER — HOSPITAL ENCOUNTER (OUTPATIENT)
Dept: PHYSICAL THERAPY | Facility: HOSPITAL | Age: 78
Setting detail: THERAPIES SERIES
Discharge: HOME | End: 2018-04-27

## 2018-04-30 ENCOUNTER — APPOINTMENT (OUTPATIENT)
Dept: PHYSICAL THERAPY | Facility: HOSPITAL | Age: 78
End: 2018-04-30

## 2018-05-01 ENCOUNTER — APPOINTMENT (OUTPATIENT)
Dept: PHYSICAL THERAPY | Facility: HOSPITAL | Age: 78
End: 2018-05-01

## 2018-05-11 ENCOUNTER — HOSPITAL ENCOUNTER (OUTPATIENT)
Dept: PHYSICAL THERAPY | Facility: HOSPITAL | Age: 78
Setting detail: THERAPIES SERIES
Discharge: HOME | End: 2018-05-11

## 2018-05-11 DIAGNOSIS — Z96.641 HISTORY OF TOTAL RIGHT HIP REPLACEMENT: ICD-10-CM

## 2018-05-11 DIAGNOSIS — R26.2 DIFFICULTY WALKING: ICD-10-CM

## 2018-05-11 DIAGNOSIS — R42 VERTIGO: ICD-10-CM

## 2018-05-11 DIAGNOSIS — Z98.1 HISTORY OF LUMBAR SPINAL FUSION: Primary | ICD-10-CM

## 2018-05-11 DIAGNOSIS — G89.29 CHRONIC MIDLINE LOW BACK PAIN WITHOUT SCIATICA: ICD-10-CM

## 2018-05-11 DIAGNOSIS — M54.50 CHRONIC MIDLINE LOW BACK PAIN WITHOUT SCIATICA: ICD-10-CM

## 2018-05-11 PROCEDURE — 97161 PT EVAL LOW COMPLEX 20 MIN: CPT | Performed by: PHYSICAL THERAPIST

## 2018-05-11 PROCEDURE — G8978 MOBILITY CURRENT STATUS: HCPCS | Performed by: PHYSICAL THERAPIST

## 2018-05-11 PROCEDURE — G8979 MOBILITY GOAL STATUS: HCPCS | Performed by: PHYSICAL THERAPIST

## 2018-05-11 NOTE — THERAPY EVALUATION
Outpatient Physical Therapy Ortho Initial Evaluation   [facility]     Patient Name: Dee Watts  : 1940  MRN: [id number]  Today's Date: 2018      Visit Date: 2018    Patient Active Problem List   Diagnosis   • DDD (degenerative disc disease), lumbar   • Osteoarthritis of hip   • Adult body mass index greater than 30   • Lumbosacral neuritis   • Lumbar canal stenosis   • Chronic right-sided low back pain without sciatica   • Hypothyroidism   • Hypertension   • Mood disorder   • Vitamin D deficiency   • IBS (irritable bowel syndrome)   • Osteoporosis   • Migraines   • GERD (gastroesophageal reflux disease)   • Insomnia   • Chronic breast pain   • Status post total hip replacement, right   • Right hip pain        Past Medical History:   Diagnosis Date   • Allergic rhinitis    • Anxiety    • Asthma    • DDD (degenerative disc disease), lumbar    • Depression    • GERD (gastroesophageal reflux disease)    • H/O bone density study 2012   • History of diverticulosis    • Hypertension    • Hypothyroidism    • IBS (irritable bowel syndrome)    • Insomnia    • Lumbar canal stenosis    • Lumbosacral neuritis    • Migraines    • Mood disorder    • Osteoarthritis    • Osteoporosis    • Thyroid goiter    • Vitamin D deficiency         Past Surgical History:   Procedure Laterality Date   • ANKLE SURGERY Left    • BACK SURGERY N/A 2005   • COLONOSCOPY N/A 2006   • HEMORRHOIDECTOMY N/A 1981   • HYSTERECTOMY N/A 1972   • PAP SMEAR N/A 10/31/2003   • THYROID CYST EXCISION      DATE NOT SPECIFIED   • WRIST SURGERY      LATERALITY AND DATE NOT SPECIFIED       Visit Dx:     ICD-10-CM ICD-9-CM   1. History of lumbar spinal fusion Z98.1 V45.4   2. Difficulty walking R26.2 719.7   3. Chronic midline low back pain without sciatica M54.5 724.2    G89.29 338.29   4. Vertigo R42 780.4   5. History of total right hip replacement Z96.641 V43.64             Patient History     Row  "Name 05/11/18 1000             History    Chief Complaint Difficulty Walking;Difficulty with daily activities;Balance Problems;Numbness;Pain;Falls/history of falls  -ZK      Type of Pain Back pain  -ZK      Date Current Problem(s) Began 01/01/18  -ZK      Brief Description of Current Complaint 77 year old with chronic LBP secondary to scoliosis and DDD compounded by her job working for [employer] [place]. Reports that her legs gave out when her \"back failed\" at age 65 and could not continue work. Pt did well s/p Lumbar fusion L4-S1 in 2005 but has always had some ongoing left leg numbness and weakness since. Reports frequent falls and 5 the past year, twice in the bathroom and more recently last Saturday when looking down during the rain caused her chronic veritigo to stir. This resulted into a fall into the bushes that saved her from serious injury. Pt uses a walker for long distances and is thinking of getting a tripod base vs. her quad cane which she feels can trip her up. No plans for VICTORIANO or surgery due to age and scar tissue thus hoping she can get better in the pool, then find an affordable place to continue water exercises. Has gotten better recently adding a heel lift to shorter L LE (due to R EVERETT and scoliosis) with spine surgeon adding to lift giving to her early this year by ortho.   -ZK      Previous treatment for THIS PROBLEM Rehabilitation   2 sessions of aquatic therapy post fusion, dates ??  -ZK      Patient/Caregiver Goals --   less pain  -ZK      Patient's Rating of General Health Fair   but balance confidence is poor  -ZK      Occupation/sports/leisure activities sedentary puzzles, cards  -ZK      What clinical tests have you had for this problem? X-ray  -ZK      Results of Clinical Tests per MD \"solid\" fusion, scoliosis. No apparent new issues.   -ZK      Are you or can you be pregnant No  -ZK         Pain     Pain Location Back  -ZK      Pain at Present 7  -ZK      Pain " at Best 3  -ZK      Pain at Worst 8  -ZK      What Performance Factors Make the Current Problem(s) WORSE? fatigue  -ZK      What Performance Factors Make the Current Problem(s) BETTER? aqua therapy in the past but allergic to chlorine pools  -ZK      Pain Comments denies LE pain  -ZK      Tolerance Time- Standing see MINDI  -ZK         Daily Activities    Primary Language English  -ZK      Barriers to learning None  -ZK      Pt Participated in POC and Goals Yes  -ZK         Safety    Are you being hurt, hit, or frightened by anyone at home or in your life? No  -ZK      Are you being neglected by a caregiver No  -ZK        User Key  (r) = Recorded By, (t) = Taken By, (c) = Cosigned By    Initials Name Provider Type    ZK Zorre Zeno Kimura, PT Physical Therapist                PT Ortho     Row Name 05/11/18 1300       Precautions and Contraindications    Precautions/Limitations fall precautions  -ZK       Posture/Observations    Alignment Options Scoliosis;Iliac crests  -ZK    Scoliosis Moderate;Severe;Increased;Standing posture   TL rib hump L side with levoscoliosis  -ZK    Posture/Observations Comments did not measure leg length today but pt happy with amount of lift in left shoe now  -ZK       General ROM    Head/Neck/Trunk --   stands in 15-20 flexion. able to come to neutral. 40 flex.  -ZK    RT Lower Ext --   good hip ROM s/p EVERETT  -ZK    GENERAL ROM COMMENTS SB to R with scoliosis but no SB L or ext past neutral.   -ZK       MMT (Manual Muscle Testing)    Additional Documentation --   4+ R LE with 4/5 L LE except 4- ant tib DF on L  -ZK       Sensation    Additional Comments states she tests normal to sensation per MDs  -ZK       Balance Skills Training    SLS unable due to fear unless holding on to me  -ZK       Transfers    Comment (Transfers) ommited 5xsts due to some vertigo after sit to supine part of eval.   -ZK       Gait/Stairs Assessment/Training    Assistive Device (Gait) cane, quad  -ZK    Comment  (Gait/Stairs) slow and cautious. will use walker for pool days due to long walk and fatigue from car.  pace 50% of norm   -ZK      User Key  (r) = Recorded By, (t) = Taken By, (c) = Cosigned By    Initials Name Provider Type    ZK Zorre Zeno Kimura, PT Physical Therapist                                  PT OP Goals     Row Name 05/11/18 1300          PT Short Term Goals    STG Date to Achieve 06/11/18  -ZK     STG 1 pt to amb safely and enter exit pool indep  -ZK     STG 1 Progress New  -ZK     STG 2 pt to tolerate 45 minutes aquatic therapy and state 4-12 hour post rx relief  -ZK     STG 2 Progress New  -ZK     STG 3 pt to note decrease in worse pain from 8 to < 6/10 after 4 weeks of rx  -ZK     STG 3 Progress New  -ZK     STG 4 MINDI to improve from 62 to < 52%   -ZK     STG 4 Progress New  -ZK     STG 5 no falls or near falls during course of PT  -ZK     STG 5 Progress New  -ZK        Long Term Goals    LTG Date to Achieve 07/11/18  -ZK     LTG 1 pt to be able to benefit from mod level ex in the pool and have a plan in place for ongoing pool exercise.   -ZK     LTG 2 pt to not have increased vertigo with pool therapy  -ZK     LTG 3 pt to be able to perform 5x sts test without vertigo and test < 15 seconds.   -ZK     LTG 4 pt to state 10-20% improvement in gait and balance confidence with aquatic therapy and home ex program.   -ZK     LTG 5 MINDI to improve to < 45% by dc  -ZK        Time Calculation    PT Goal Re-Cert Due Date 08/11/18  -ZK       User Key  (r) = Recorded By, (t) = Taken By, (c) = Cosigned By    Initials Name Provider Type    ZK Zorre Zeno Kimura, PT Physical Therapist                PT Assessment/Plan     Row Name 05/11/18 1314          PT Assessment    Functional Limitations Decreased safety during functional activities;Limitations in functional capacity and performance;Performance in leisure activities;Performance in self-care ADL;Limitations in community activities;Limitation in home  management;Impaired locomotion  -ZK     Impairments Balance;Locomotion;Impaired muscle endurance;Muscle strength;Pain  -ZK     Assessment Comments pt with moderate to severe kyphoscoliosis, left LE weakness and distal numbness, and fear of falling. Her life is also complicated with vertigo if she moves her head excessively or lays down too fast. Feels like she will be safe around the pool and has benefitted from 2 prior aquatic PT sessions in the past. Condition appears stable but prognosis is only fair at this time due to nature of long standing DDD and distal weakness. Pt does have a good attitude and is eager to get better with non surgical approach.   -ZK        PT Plan    PT Plan Comments start with 4 weeks aquatic PT then consider additional time in the pool or land based PT  -ZK       User Key  (r) = Recorded By, (t) = Taken By, (c) = Cosigned By    Initials Name Provider Type    ZK Zorre Zeno Kimura, PT Physical Therapist                              Outcome Measure Options: Modifed Owestry  Modified Oswestry  Modified Oswestry Score/Comments: 62%      Time Calculation:   Start Time: 1000  Stop Time: 1045  Time Calculation (min): 45 min  Total Timed Code Minutes- PT: 45 minute(s)     Therapy Charges for Today     Code Description Service Date Service Provider Modifiers Qty    19096781149 HC PT MOBILITY CURRENT 5/11/2018 Zorre Zeno Kimura, PT GP, CL 1    26292059078 HC PT MOBILITY PROJECTED 5/11/2018 Zorre Zeno Kimura, PT GP, CK 1    85301620833 HC PT AQUA EVAL LOW COMPLEXITY 3 5/11/2018 Zorre Zeno Kimura, PT GP 1          PT G-Codes  Outcome Measure Options: Modifed Owestry  Score: 62%  Functional Limitation: Mobility: Walking and moving around  Mobility: Walking and Moving Around Current Status (): At least 60 percent but less than 80 percent impaired, limited or restricted  Mobility: Walking and Moving Around Goal Status (): At least 40 percent but less than 60 percent impaired, limited or  restricted         Zorre Zeno Kimura, PT  5/11/2018

## 2018-05-17 ENCOUNTER — APPOINTMENT (OUTPATIENT)
Dept: PHYSICAL THERAPY | Facility: HOSPITAL | Age: 78
End: 2018-05-17

## 2018-05-22 ENCOUNTER — HOSPITAL ENCOUNTER (OUTPATIENT)
Dept: PHYSICAL THERAPY | Facility: HOSPITAL | Age: 78
Setting detail: THERAPIES SERIES
Discharge: HOME | End: 2018-05-22

## 2018-05-22 DIAGNOSIS — G89.29 CHRONIC MIDLINE LOW BACK PAIN WITHOUT SCIATICA: ICD-10-CM

## 2018-05-22 DIAGNOSIS — R26.2 DIFFICULTY WALKING: ICD-10-CM

## 2018-05-22 DIAGNOSIS — M54.50 CHRONIC MIDLINE LOW BACK PAIN WITHOUT SCIATICA: ICD-10-CM

## 2018-05-22 DIAGNOSIS — R42 VERTIGO: ICD-10-CM

## 2018-05-22 DIAGNOSIS — Z98.1 HISTORY OF LUMBAR SPINAL FUSION: Primary | ICD-10-CM

## 2018-05-22 DIAGNOSIS — Z96.641 HISTORY OF TOTAL RIGHT HIP REPLACEMENT: ICD-10-CM

## 2018-05-22 PROCEDURE — 97113 AQUATIC THERAPY/EXERCISES: CPT | Performed by: PHYSICAL THERAPIST

## 2018-05-22 NOTE — THERAPY TREATMENT NOTE
Outpatient Physical Therapy Ortho Treatment Note   [facility]     Patient Name: Dee Watts  : 1940  MRN: [id number]  Today's Date: 2018      Visit Date: 2018    Visit Dx:    ICD-10-CM ICD-9-CM   1. History of lumbar spinal fusion Z98.1 V45.4   2. Difficulty walking R26.2 719.7   3. Chronic midline low back pain without sciatica M54.5 724.2    G89.29 338.29   4. Vertigo R42 780.4   5. History of total right hip replacement Z96.641 V43.64       Patient Active Problem List   Diagnosis   • DDD (degenerative disc disease), lumbar   • Osteoarthritis of hip   • Adult body mass index greater than 30   • Lumbosacral neuritis   • Lumbar canal stenosis   • Chronic right-sided low back pain without sciatica   • Hypothyroidism   • Hypertension   • Mood disorder   • Vitamin D deficiency   • IBS (irritable bowel syndrome)   • Osteoporosis   • Migraines   • GERD (gastroesophageal reflux disease)   • Insomnia   • Chronic breast pain   • Status post total hip replacement, right   • Right hip pain        Past Medical History:   Diagnosis Date   • Allergic rhinitis    • Anxiety    • Asthma    • DDD (degenerative disc disease), lumbar    • Depression    • GERD (gastroesophageal reflux disease)    • H/O bone density study 2012   • History of diverticulosis    • Hypertension    • Hypothyroidism    • IBS (irritable bowel syndrome)    • Insomnia    • Lumbar canal stenosis    • Lumbosacral neuritis    • Migraines    • Mood disorder    • Osteoarthritis    • Osteoporosis    • Thyroid goiter    • Vitamin D deficiency         Past Surgical History:   Procedure Laterality Date   • ANKLE SURGERY Left    • BACK SURGERY N/A 2005   • COLONOSCOPY N/A 2006   • HEMORRHOIDECTOMY N/A 1981   • HYSTERECTOMY N/A 1972   • PAP SMEAR N/A 10/31/2003   • THYROID CYST EXCISION      DATE NOT SPECIFIED   • WRIST SURGERY      LATERALITY AND DATE NOT SPECIFIED                             PT  Assessment/Plan     Row Name 05/22/18 1128 05/22/18 1056       PT Assessment    Assessment Comments pt needed fitness staff help to get to locker then asked for more help after short session today. explained she needs to ask her children to help or hire an aide for pool therapy if she requires assistance. understands this and hopes to be more indep next time pending vertigo.   -ZK lots of near vertigo today with motion and visual movement of water. this may hinder participation but pt wants to push thru  -ZK      User Key  (r) = Recorded By, (t) = Taken By, (c) = Cosigned By    Initials Name Provider Type    ZK Zorre Zeno Kimura, PT Physical Therapist                    Exercises     Row Name 05/22/18 1000             Subjective Comments    Subjective Comments bothered by vertigo feeling today so couldnt look at water. will try to wear sun glasses next time. also tired from using q cane vs. walker due to rain preventing car transport.  -ZK         Subjective Pain    Able to rate subjective pain? yes  -ZK      Pre-Treatment Pain Level 2  -ZK      Post-Treatment Pain Level 2  -ZK      Subjective Pain Comment small ‘stream’ of midline LBP.  -ZK         Aquatics    Aquatics performed? Yes  -ZK         Aquatics LE    Water Walk forward;side   with railing just from bench to chest high water  -ZK      Clams standing hip circles 10  -ZK      Hip Abd/Add 10  -ZK      March in Place 10  -ZK      Toe/Heel Raises 10  -ZK      Bicycle bench 2'  -ZK      Flutter/Scissor knees bent on bench 2'  -ZK        User Key  (r) = Recorded By, (t) = Taken By, (c) = Cosigned By    Initials Name Provider Type    ZK Zorre Zeno Kimura, PT Physical Therapist                                            Time Calculation:   Start Time: 1030  Stop Time: 1100  Time Calculation (min): 30 min  Total Timed Code Minutes- PT: 30 minute(s)    Therapy Charges for Today     Code Description Service Date Service Provider Modifiers Qty    07682974456  PT  AQUATIC THERAPY EA 15 MIN 5/22/2018 Zorre Zeno Kimura, PT GP 2                    Zorre Zeno Kimura, PT  5/22/2018

## 2018-06-04 ENCOUNTER — HOSPITAL ENCOUNTER (OUTPATIENT)
Dept: PHYSICAL THERAPY | Facility: HOSPITAL | Age: 78
Setting detail: THERAPIES SERIES
Discharge: HOME | End: 2018-06-04

## 2018-06-04 DIAGNOSIS — Z96.641 HISTORY OF TOTAL RIGHT HIP REPLACEMENT: ICD-10-CM

## 2018-06-04 DIAGNOSIS — R26.2 DIFFICULTY WALKING: ICD-10-CM

## 2018-06-04 DIAGNOSIS — M54.50 CHRONIC MIDLINE LOW BACK PAIN WITHOUT SCIATICA: Primary | ICD-10-CM

## 2018-06-04 DIAGNOSIS — Z98.1 HISTORY OF LUMBAR SPINAL FUSION: ICD-10-CM

## 2018-06-04 DIAGNOSIS — R42 VERTIGO: ICD-10-CM

## 2018-06-04 DIAGNOSIS — G89.29 CHRONIC MIDLINE LOW BACK PAIN WITHOUT SCIATICA: Primary | ICD-10-CM

## 2018-06-04 PROCEDURE — 97113 AQUATIC THERAPY/EXERCISES: CPT | Performed by: PHYSICAL THERAPIST

## 2018-06-04 NOTE — THERAPY TREATMENT NOTE
Outpatient Physical Therapy Ortho Treatment Note  [facility]     Patient Name: Dee Watts  : 1940  MRN: [id number]  Today's Date: 2018      Visit Date: 2018    Visit Dx:    ICD-10-CM ICD-9-CM   1. Chronic midline low back pain without sciatica M54.5 724.2    G89.29 338.29   2. History of lumbar spinal fusion Z98.1 V45.4   3. Difficulty walking R26.2 719.7   4. Vertigo R42 780.4   5. History of total right hip replacement Z96.641 V43.64       Patient Active Problem List   Diagnosis   • DDD (degenerative disc disease), lumbar   • Osteoarthritis of hip   • Adult body mass index greater than 30   • Lumbosacral neuritis   • Lumbar canal stenosis   • Chronic right-sided low back pain without sciatica   • Hypothyroidism   • Hypertension   • Mood disorder   • Vitamin D deficiency   • IBS (irritable bowel syndrome)   • Osteoporosis   • Migraines   • GERD (gastroesophageal reflux disease)   • Insomnia   • Chronic breast pain   • Status post total hip replacement, right   • Right hip pain        Past Medical History:   Diagnosis Date   • Allergic rhinitis    • Anxiety    • Asthma    • DDD (degenerative disc disease), lumbar    • Depression    • GERD (gastroesophageal reflux disease)    • H/O bone density study 2012   • History of diverticulosis    • Hypertension    • Hypothyroidism    • IBS (irritable bowel syndrome)    • Insomnia    • Lumbar canal stenosis    • Lumbosacral neuritis    • Migraines    • Mood disorder    • Osteoarthritis    • Osteoporosis    • Thyroid goiter    • Vitamin D deficiency         Past Surgical History:   Procedure Laterality Date   • ANKLE SURGERY Left    • BACK SURGERY N/A 2005   • COLONOSCOPY N/A 2006   • HEMORRHOIDECTOMY N/A 1981   • HYSTERECTOMY N/A 1972   • PAP SMEAR N/A 10/31/2003   • THYROID CYST EXCISION      DATE NOT SPECIFIED   • WRIST SURGERY      LATERALITY AND DATE NOT SPECIFIED                             PT Assessment/Plan      Row Name 06/04/18 0946          PT Assessment    Assessment Comments Pt used rollator today, therefore did not request assist in locker room today. She reported less vertigo during session with use of sunglasses. Dee enters pool via stairs with both hands on rail, CGA and exits pool with HHA and rail. Shakey in water and requires railing support and SBA for water walking.  -CLAIRE       User Key  (r) = Recorded By, (t) = Taken By, (c) = Cosigned By    Initials Name Provider Type    CLAIRE Mayfield, PT Physical Therapist                    Exercises     Row Name 06/04/18 0900             Subjective Comments    Subjective Comments brought rollator today so that helped. Wearing sunglasses during session helped.  -CLAIRE         Subjective Pain    Able to rate subjective pain? yes  -CLAIRE      Pre-Treatment Pain Level 2  -CLAIRE      Post-Treatment Pain Level 2  -CLAIRE         Aquatics    Aquatics performed? Yes  -CLAIRE         Aquatics LE    Water Walk forward;side   At rail with SBA  -CLAIRE      Stretch 1 Hamstrings (back to wall), min assist 20 sec X 2 e.  -CLAIRE      Abdominals hands   10X  -CLAIRE      Hip Abd/Add 10X ea  -CLAIRE      March in Place 10X holding rail B UEs  -CLAIRE      Mini Squat 10X  -CLAIRE      Toe/Heel Raises 12/12 B  -CLAIRE      Bicycle bench 1 min  -CLAIRE      Flutter/Scissor 20/20  -CLAIRE        User Key  (r) = Recorded By, (t) = Taken By, (c) = Cosigned By    Initials Name Provider Type    CLAIRE Mayfield, PT Physical Therapist                                            Time Calculation:   Start Time: 0906  Stop Time: 0945  Time Calculation (min): 39 min  Total Timed Code Minutes- PT: 39 minute(s)    Therapy Charges for Today     Code Description Service Date Service Provider Modifiers Qty    21713268626  PT AQUATIC THERAPY EA 15 MIN 6/4/2018 Jay Mayfield, PT GP 3                    Jay Mayfield, PT  6/4/2018

## 2018-06-06 ENCOUNTER — HOSPITAL ENCOUNTER (OUTPATIENT)
Dept: PHYSICAL THERAPY | Facility: HOSPITAL | Age: 78
Setting detail: THERAPIES SERIES
Discharge: HOME | End: 2018-06-06

## 2018-06-06 DIAGNOSIS — R42 VERTIGO: ICD-10-CM

## 2018-06-06 DIAGNOSIS — G89.29 CHRONIC MIDLINE LOW BACK PAIN WITHOUT SCIATICA: Primary | ICD-10-CM

## 2018-06-06 DIAGNOSIS — Z98.1 HISTORY OF LUMBAR SPINAL FUSION: ICD-10-CM

## 2018-06-06 DIAGNOSIS — Z96.641 HISTORY OF TOTAL RIGHT HIP REPLACEMENT: ICD-10-CM

## 2018-06-06 DIAGNOSIS — M54.50 CHRONIC MIDLINE LOW BACK PAIN WITHOUT SCIATICA: Primary | ICD-10-CM

## 2018-06-06 DIAGNOSIS — R26.2 DIFFICULTY WALKING: ICD-10-CM

## 2018-06-06 PROCEDURE — 97113 AQUATIC THERAPY/EXERCISES: CPT | Performed by: PHYSICAL THERAPIST

## 2018-06-06 NOTE — THERAPY TREATMENT NOTE
Outpatient Physical Therapy Ortho Treatment Note  [facility]     Patient Name: Dee Watts  : 1940  MRN: [id number]  Today's Date: 2018      Visit Date: 2018    Visit Dx:    ICD-10-CM ICD-9-CM   1. Chronic midline low back pain without sciatica M54.5 724.2    G89.29 338.29   2. History of lumbar spinal fusion Z98.1 V45.4   3. Difficulty walking R26.2 719.7   4. Vertigo R42 780.4   5. History of total right hip replacement Z96.641 V43.64       Patient Active Problem List   Diagnosis   • DDD (degenerative disc disease), lumbar   • Osteoarthritis of hip   • Adult body mass index greater than 30   • Lumbosacral neuritis   • Lumbar canal stenosis   • Chronic right-sided low back pain without sciatica   • Hypothyroidism   • Hypertension   • Mood disorder   • Vitamin D deficiency   • IBS (irritable bowel syndrome)   • Osteoporosis   • Migraines   • GERD (gastroesophageal reflux disease)   • Insomnia   • Chronic breast pain   • Status post total hip replacement, right   • Right hip pain        Past Medical History:   Diagnosis Date   • Allergic rhinitis    • Anxiety    • Asthma    • DDD (degenerative disc disease), lumbar    • Depression    • GERD (gastroesophageal reflux disease)    • H/O bone density study 2012   • History of diverticulosis    • Hypertension    • Hypothyroidism    • IBS (irritable bowel syndrome)    • Insomnia    • Lumbar canal stenosis    • Lumbosacral neuritis    • Migraines    • Mood disorder    • Osteoarthritis    • Osteoporosis    • Thyroid goiter    • Vitamin D deficiency         Past Surgical History:   Procedure Laterality Date   • ANKLE SURGERY Left    • BACK SURGERY N/A 2005   • COLONOSCOPY N/A 2006   • HEMORRHOIDECTOMY N/A 1981   • HYSTERECTOMY N/A 1972   • PAP SMEAR N/A 10/31/2003   • THYROID CYST EXCISION      DATE NOT SPECIFIED   • WRIST SURGERY      LATERALITY AND DATE NOT SPECIFIED                             PT Assessment/Plan      Row Name 06/06/18 0944          PT Assessment    Assessment Comments Hanna reports significant fatigue after last appt. as well as increased LBP from taking her rollator in/out of her car. Suggested she use cane to enter facility and then use wheelchair to get to pool area.    -CLAIRE       User Key  (r) = Recorded By, (t) = Taken By, (c) = Cosigned By    Initials Name Provider Type    CLAIRE Mayfield, PT Physical Therapist                    Exercises     Row Name 06/06/18 0900             Subjective Comments    Subjective Comments Was so tired after last session, slept through dinner, only got up to go to the bathroom.  -CLAIRE         Subjective Pain    Able to rate subjective pain? yes  -CLAIRE      Pre-Treatment Pain Level 4  -CLIARE      Subjective Pain Comment My back hurt more after last treatment because I had to lift my rollator in and out of the car.  -CLAIRE         Aquatics    Aquatics performed? Yes  -CLAIRE         Aquatics LE    Water Walk forward;side   At rail with SBA  -CLAIRE      Stretch 1 Hamstrings (back to wall), min assist 15 sec X 2 e.  -CLAIRE      Stretch 2 Calf 20 sec ea  -CLAIRE      Abdominals noodle   10X Ln   -CLAIRE      Hip Abd/Add 10X ea  -CLAIRE      March in Place 10X holding rail B UEs  -CLAIRE      Mini Squat 10X  -CLAIRE      Toe/Heel Raises 10/10 B  -CLAIRE      Bicycle bench 15X  -CLAIRE      Flutter/Scissor 20/-  -CLAIRE        User Key  (r) = Recorded By, (t) = Taken By, (c) = Cosigned By    Initials Name Provider Type    CLAIRE Mayfield, PT Physical Therapist                                            Time Calculation:   Start Time: 0905  Stop Time: 0943  Time Calculation (min): 38 min  Total Timed Code Minutes- PT: 38 minute(s)    Therapy Charges for Today     Code Description Service Date Service Provider Modifiers Qty    03911412364 HC PT AQUATIC THERAPY EA 15 MIN 6/6/2018 Jay Mayfield, PT GP 3                    Jay Mayfield, PT  6/6/2018

## 2018-06-11 ENCOUNTER — HOSPITAL ENCOUNTER (OUTPATIENT)
Dept: PHYSICAL THERAPY | Facility: HOSPITAL | Age: 78
Setting detail: THERAPIES SERIES
Discharge: HOME | End: 2018-06-11

## 2018-06-11 DIAGNOSIS — G89.29 CHRONIC MIDLINE LOW BACK PAIN WITHOUT SCIATICA: Primary | ICD-10-CM

## 2018-06-11 DIAGNOSIS — R26.2 DIFFICULTY WALKING: ICD-10-CM

## 2018-06-11 DIAGNOSIS — Z96.641 HISTORY OF TOTAL RIGHT HIP REPLACEMENT: ICD-10-CM

## 2018-06-11 DIAGNOSIS — R42 VERTIGO: ICD-10-CM

## 2018-06-11 DIAGNOSIS — M54.50 CHRONIC MIDLINE LOW BACK PAIN WITHOUT SCIATICA: Primary | ICD-10-CM

## 2018-06-11 DIAGNOSIS — Z98.1 HISTORY OF LUMBAR SPINAL FUSION: ICD-10-CM

## 2018-06-11 PROCEDURE — 97113 AQUATIC THERAPY/EXERCISES: CPT | Performed by: PHYSICAL THERAPIST

## 2018-06-11 NOTE — THERAPY PROGRESS REPORT/RE-CERT
Outpatient Physical Therapy Ortho Progress Note/Recertification  [facility]     Patient Name: Dee Watts  : 1940  MRN: [id number]  Today's Date: 2018      Visit Date: 2018    Patient Active Problem List   Diagnosis   • DDD (degenerative disc disease), lumbar   • Osteoarthritis of hip   • Adult body mass index greater than 30   • Lumbosacral neuritis   • Lumbar canal stenosis   • Chronic right-sided low back pain without sciatica   • Hypothyroidism   • Hypertension   • Mood disorder   • Vitamin D deficiency   • IBS (irritable bowel syndrome)   • Osteoporosis   • Migraines   • GERD (gastroesophageal reflux disease)   • Insomnia   • Chronic breast pain   • Status post total hip replacement, right   • Right hip pain        Past Medical History:   Diagnosis Date   • Allergic rhinitis    • Anxiety    • Asthma    • DDD (degenerative disc disease), lumbar    • Depression    • GERD (gastroesophageal reflux disease)    • H/O bone density study 2012   • History of diverticulosis    • Hypertension    • Hypothyroidism    • IBS (irritable bowel syndrome)    • Insomnia    • Lumbar canal stenosis    • Lumbosacral neuritis    • Migraines    • Mood disorder    • Osteoarthritis    • Osteoporosis    • Thyroid goiter    • Vitamin D deficiency         Past Surgical History:   Procedure Laterality Date   • ANKLE SURGERY Left    • BACK SURGERY N/A 2005   • COLONOSCOPY N/A 2006   • HEMORRHOIDECTOMY N/A 1981   • HYSTERECTOMY N/A 1972   • PAP SMEAR N/A 10/31/2003   • THYROID CYST EXCISION      DATE NOT SPECIFIED   • WRIST SURGERY      LATERALITY AND DATE NOT SPECIFIED       Visit Dx:     ICD-10-CM ICD-9-CM   1. Chronic midline low back pain without sciatica M54.5 724.2    G89.29 338.29   2. History of lumbar spinal fusion Z98.1 V45.4   3. Vertigo R42 780.4   4. History of total right hip replacement Z96.641 V43.64   5. Difficulty walking R26.2 719.7                                        PT OP Goals     Row Name 06/11/18 0900          PT Short Term Goals    STG Date to Achieve 06/11/18  -CLAIRE     STG 1 pt to amb safely and enter exit pool indep  -CLAIRE     STG 1 Progress Ongoing  -CLAIRE     STG 1 Progress Comments  CGA to enter, Minimal assist to exit via stairs with railing.  -CLAIRE     STG 2 pt to tolerate 45 minutes aquatic therapy and state 4-12 hour post rx relief  -CLAIRE     STG 2 Progress Ongoing  -CLAIRE     STG 2 Progress Comments No pain relief, reports fatigue and soreness after treatments.   -CLAIRE     STG 3 pt to note decrease in worse pain from 8 to < 6/10 after 4 weeks of rx  -CLAIRE     STG 3 Progress Ongoing  -CLAIRE     STG 3 Progress Comments Has only had 3 treatments prior to today.   -CLAIRE     STG 4 MINDI to improve from 62 to < 52%   -CLAIRE     STG 4 Progress Ongoing  -CLAIRE     STG 4 Progress Comments 62%  -CLAIRE     STG 5 no falls or near falls during course of PT  -CLAIRE     STG 5 Progress Met  -CLAIRE        Long Term Goals    LTG Date to Achieve 07/11/18  -CLAIRE     LTG 1 pt to be able to benefit from mod level ex in the pool and have a plan in place for ongoing pool exercise.   -CLAIRE     LTG 1 Progress Ongoing  -CLAIRE     LTG 1 Progress Comments Would like to join [facility] if financially able.  -CLAIRE     LTG 2 pt to not have increased vertigo with pool therapy  -CLAIRE     LTG 2 Progress Met  -CLAIRE     LTG 3 pt to be able to perform 5x sts test without vertigo and test < 15 seconds.   -CLAIRE     LTG 3 Progress Progressing  -CLAIRE     LTG 3 Progress Comments No vertigo, but very unsteady, requires hand support at times and > 20 sec.  -CLAIRE     LTG 4 pt to state 10-20% improvement in gait and balance confidence with aquatic therapy and home ex program.   -CLAIRE     LTG 4 Progress Ongoing  -CLAIRE     LTG 5 MINDI to improve to < 45% by dc  -CLAIRE     LTG 5 Progress Ongoing  -CLAIRE       User Key  (r) = Recorded By, (t) = Taken By, (c) = Cosigned By    Initials Name Provider Type    CLAIRE Mayfield, PT Physical Therapist                PT  Assessment/Plan     Row Name 06/11/18 1107          PT Assessment    Functional Limitations Decreased safety during functional activities;Limitations in functional capacity and performance;Performance in leisure activities;Performance in self-care ADL;Limitations in community activities;Limitation in home management;Impaired locomotion  -CLAIRE     Impairments Balance;Locomotion;Impaired muscle endurance;Muscle strength;Pain  -CLAIRE     Assessment Comments Hanna is here for her 4th aquatic session.  She has been slow to progress and was initially limited by reports of “vertigo” that have been remedied by use of sunglasses.  She is very unsteady in the pool and requires CGA to enter pool via stairs and railing and then minimal assist to exit pool in same manner.  Back pain varies and Oswestry score is unchanged.  Hanna remains appropriate for skilled PT at this time.    -CLAIRE     Please refer to paper survey for additional self-reported information Yes  -CLAIRE     Rehab Potential Good  -CLAIRE     Patient/caregiver participated in establishment of treatment plan and goals Yes  -CLAIRE     Patient would benefit from skilled therapy intervention Yes  -CLAIRE        PT Plan    PT Frequency 2x/week  -CLAIRE     Predicted Duration of Therapy Intervention (OT Eval) one month  -CLAIRE     Planned CPT's? PT AQUATIC THERAPY EA 15 MIN: 21608  -CLAIRE     PT Plan Comments Consider transition to land based PT, especially if patient does not have financial means for a pool membership (she states she is unable to use [employer] due to problems breathing related to their chemicals).   -CLAIRE       User Key  (r) = Recorded By, (t) = Taken By, (c) = Cosigned By    Initials Name Provider Type    CLAIRE Mayfield, PT Physical Therapist                  Exercises     Row Name 06/11/18 0900             Subjective Comments    Subjective Comments Not as tired after last session. I was hoping to lose weight for my grandson’s wedding at the end of the month but that’s not  going to happen.  -CLAIRE         Subjective Pain    Able to rate subjective pain? yes  -CLAIRE      Pre-Treatment Pain Level 5  -CLAIRE      Post-Treatment Pain Level 5  -CLAIRE         Aquatics    Aquatics performed? Yes  -CLAIRE         Aquatics LE    Water Walk forward;side   At rail with SBA  -CLAIRE      Stretch 1 Hamstrings (back to wall), min assist 15 sec X 2 e.  -CLAIRE      Stretch 2 Calf 20 sec ea  -CLAIRE      Stretch Other 1 Seated LAQs X 5 ea  -CLAIRE      Abdominals noodle   10X Ln   -CLAIRE      March in Place 10X holding rail B UEs  -CLAIRE      Mini Squat 10X  -CLAIRE      Toe/Heel Raises 10/10 B  -CLAIRE      Bicycle bench 15X  -CLAIRE      Flutter/Scissor 15/15  -CLAIRE        User Key  (r) = Recorded By, (t) = Taken By, (c) = Cosigned By    Initials Name Provider Type    CLAIRE Mayfield, PT Physical Therapist                                  Time Calculation:   Start Time: 0900  Stop Time: 0945  Time Calculation (min): 45 min  Total Timed Code Minutes- PT: 40 minute(s)     Therapy Charges for Today     Code Description Service Date Service Provider Modifiers Qty    06765225868 HC PT AQUATIC THERAPY EA 15 MIN 6/11/2018 Jay Mayfield, PT GP 3                    Jay Mayfield, PT  6/11/2018

## 2018-06-13 ENCOUNTER — HOSPITAL ENCOUNTER (OUTPATIENT)
Dept: PHYSICAL THERAPY | Facility: HOSPITAL | Age: 78
Setting detail: THERAPIES SERIES
Discharge: HOME | End: 2018-06-13

## 2018-06-13 DIAGNOSIS — G89.29 CHRONIC MIDLINE LOW BACK PAIN WITHOUT SCIATICA: Primary | ICD-10-CM

## 2018-06-13 DIAGNOSIS — R26.2 DIFFICULTY WALKING: ICD-10-CM

## 2018-06-13 DIAGNOSIS — Z96.641 HISTORY OF TOTAL RIGHT HIP REPLACEMENT: ICD-10-CM

## 2018-06-13 DIAGNOSIS — Z98.1 HISTORY OF LUMBAR SPINAL FUSION: ICD-10-CM

## 2018-06-13 DIAGNOSIS — M54.50 CHRONIC MIDLINE LOW BACK PAIN WITHOUT SCIATICA: Primary | ICD-10-CM

## 2018-06-13 DIAGNOSIS — R42 VERTIGO: ICD-10-CM

## 2018-06-13 PROCEDURE — 97113 AQUATIC THERAPY/EXERCISES: CPT | Performed by: PHYSICAL THERAPIST

## 2018-06-13 NOTE — THERAPY TREATMENT NOTE
Outpatient Physical Therapy Ortho Treatment Note  [facility]     Patient Name: Dee Watts  : 1940  MRN: [id number]  Today's Date: 2018      Visit Date: 2018    Visit Dx:    ICD-10-CM ICD-9-CM   1. Chronic midline low back pain without sciatica M54.5 724.2    G89.29 338.29   2. History of lumbar spinal fusion Z98.1 V45.4   3. Vertigo R42 780.4   4. History of total right hip replacement Z96.641 V43.64   5. Difficulty walking R26.2 719.7       Patient Active Problem List   Diagnosis   • DDD (degenerative disc disease), lumbar   • Osteoarthritis of hip   • Adult body mass index greater than 30   • Lumbosacral neuritis   • Lumbar canal stenosis   • Chronic right-sided low back pain without sciatica   • Hypothyroidism   • Hypertension   • Mood disorder   • Vitamin D deficiency   • IBS (irritable bowel syndrome)   • Osteoporosis   • Migraines   • GERD (gastroesophageal reflux disease)   • Insomnia   • Chronic breast pain   • Status post total hip replacement, right   • Right hip pain        Past Medical History:   Diagnosis Date   • Allergic rhinitis    • Anxiety    • Asthma    • DDD (degenerative disc disease), lumbar    • Depression    • GERD (gastroesophageal reflux disease)    • H/O bone density study 2012   • History of diverticulosis    • Hypertension    • Hypothyroidism    • IBS (irritable bowel syndrome)    • Insomnia    • Lumbar canal stenosis    • Lumbosacral neuritis    • Migraines    • Mood disorder    • Osteoarthritis    • Osteoporosis    • Thyroid goiter    • Vitamin D deficiency         Past Surgical History:   Procedure Laterality Date   • ANKLE SURGERY Left    • BACK SURGERY N/A 2005   • COLONOSCOPY N/A 2006   • HEMORRHOIDECTOMY N/A 1981   • HYSTERECTOMY N/A 1972   • PAP SMEAR N/A 10/31/2003   • THYROID CYST EXCISION      DATE NOT SPECIFIED   • WRIST SURGERY      LATERALITY AND DATE NOT SPECIFIED                             PT  Assessment/Plan     Row Name 06/13/18 0932          PT Assessment    Assessment Comments Hanna requires assistance in pool during water therapy due to unsteadiness as well as some fear of falling. She will benefit from transition to land based PT after completing full course of aquatics to improve her strength, balance and confidence.   -CLAIRE       User Key  (r) = Recorded By, (t) = Taken By, (c) = Cosigned By    Initials Name Provider Type    CLARIE Mayfield, PT Physical Therapist                    Exercises     Row Name 06/13/18 0900             Subjective Pain    Able to rate subjective pain? yes  -CLAIRE      Pre-Treatment Pain Level 3  -CLAIRE      Post-Treatment Pain Level 3  -CLAIRE      Subjective Pain Comment Back is not too bad today.  -CLAIRE         Aquatics    Aquatics performed? Yes  -CLAIRE         Aquatics LE    Water Walk forward;side   At rail with SBA  -CLAIRE      Stretch 1 Hamstrings (back to wall), min assist 20 sec  -CLAIRE      Stretch 2 Calf 20 sec ea  -CLAIRE      Stretch 3 Shld Rolls X 8  -CLAIRE      Stretch Other 1 Seated LAQs X 5 ea  -CLAIRE      Stretch Other 2 Scap squeeze instruction X 2, 5 sec   -CLAIRE      Vertical Traction Decompression break X 1 min.  -CLAIRE      Abdominals noodle   10X Ln   -CLAIRE      Hip Abd/Add 10 x ea  -CLAIRE      March in Place 10X holding rail B UEs  -CLAIRE      Mini Squat 10X  -CLAIRE      Toe/Heel Raises 15/15 B  -CLAIRE      Bicycle bench 15X  -CLAIRE      Flutter/Scissor 15/15  -CLAIRE        User Key  (r) = Recorded By, (t) = Taken By, (c) = Cosigned By    Initials Name Provider Type    CLAIRE Mayfield, PT Physical Therapist                                            Time Calculation:   Start Time: 0906  Stop Time: 0945  Time Calculation (min): 39 min  Total Timed Code Minutes- PT: 39 minute(s)  Therapy Suggested Charges     Code   Minutes Charges    None           Therapy Charges for Today     Code Description Service Date Service Provider Modifiers Qty    99780478661 HC PT AQUATIC THERAPY EA 15 MIN 6/13/2018 Jay JOE  Chaparro, PT GP 3                    Jay Mayfield, PT  6/13/2018

## 2018-06-18 ENCOUNTER — HOSPITAL ENCOUNTER (OUTPATIENT)
Dept: PHYSICAL THERAPY | Facility: HOSPITAL | Age: 78
Setting detail: THERAPIES SERIES
Discharge: HOME | End: 2018-06-18

## 2018-06-18 DIAGNOSIS — Z96.641 HISTORY OF TOTAL RIGHT HIP REPLACEMENT: ICD-10-CM

## 2018-06-18 DIAGNOSIS — Z98.1 HISTORY OF LUMBAR SPINAL FUSION: ICD-10-CM

## 2018-06-18 DIAGNOSIS — R42 VERTIGO: ICD-10-CM

## 2018-06-18 DIAGNOSIS — G89.29 CHRONIC MIDLINE LOW BACK PAIN WITHOUT SCIATICA: Primary | ICD-10-CM

## 2018-06-18 DIAGNOSIS — R26.2 DIFFICULTY WALKING: ICD-10-CM

## 2018-06-18 DIAGNOSIS — M54.50 CHRONIC MIDLINE LOW BACK PAIN WITHOUT SCIATICA: Primary | ICD-10-CM

## 2018-06-18 PROCEDURE — 97113 AQUATIC THERAPY/EXERCISES: CPT | Performed by: PHYSICAL THERAPIST

## 2018-06-18 NOTE — THERAPY TREATMENT NOTE
Outpatient Physical Therapy Ortho Treatment Note  [facility]     Patient Name: Dee Watts  : 1940  MRN: [id number]  Today's Date: 2018      Visit Date: 2018    Visit Dx:    ICD-10-CM ICD-9-CM   1. Chronic midline low back pain without sciatica M54.5 724.2    G89.29 338.29   2. History of lumbar spinal fusion Z98.1 V45.4   3. Vertigo R42 780.4   4. History of total right hip replacement Z96.641 V43.64   5. Difficulty walking R26.2 719.7       Patient Active Problem List   Diagnosis   • DDD (degenerative disc disease), lumbar   • Osteoarthritis of hip   • Adult body mass index greater than 30   • Lumbosacral neuritis   • Lumbar canal stenosis   • Chronic right-sided low back pain without sciatica   • Hypothyroidism   • Hypertension   • Mood disorder   • Vitamin D deficiency   • IBS (irritable bowel syndrome)   • Osteoporosis   • Migraines   • GERD (gastroesophageal reflux disease)   • Insomnia   • Chronic breast pain   • Status post total hip replacement, right   • Right hip pain        Past Medical History:   Diagnosis Date   • Allergic rhinitis    • Anxiety    • Asthma    • DDD (degenerative disc disease), lumbar    • Depression    • GERD (gastroesophageal reflux disease)    • H/O bone density study 2012   • History of diverticulosis    • Hypertension    • Hypothyroidism    • IBS (irritable bowel syndrome)    • Insomnia    • Lumbar canal stenosis    • Lumbosacral neuritis    • Migraines    • Mood disorder    • Osteoarthritis    • Osteoporosis    • Thyroid goiter    • Vitamin D deficiency         Past Surgical History:   Procedure Laterality Date   • ANKLE SURGERY Left    • BACK SURGERY N/A 2005   • COLONOSCOPY N/A 2006   • HEMORRHOIDECTOMY N/A 1981   • HYSTERECTOMY N/A 1972   • PAP SMEAR N/A 10/31/2003   • THYROID CYST EXCISION      DATE NOT SPECIFIED   • WRIST SURGERY      LATERALITY AND DATE NOT SPECIFIED                             PT  Assessment/Plan     Row Name 06/18/18 0941          PT Assessment    Assessment Comments No reports of vertigo last few sessions. Still unstaedy and apprehensive in pool, requiring SBA to CGA.  Able to walk in pool holding rail with supervision now.  Enters pool with supervision and exits via staris with railing and HHA.  -CLAIRE       User Key  (r) = Recorded By, (t) = Taken By, (c) = Cosigned By    Initials Name Provider Type    CLAIRE Mayfield, PT Physical Therapist                    Exercises     Row Name 06/18/18 0900             Subjective Comments    Subjective Comments Moving slow this morning, got up a little later.   -CLAIRE         Subjective Pain    Able to rate subjective pain? yes  -CLAIRE      Pre-Treatment Pain Level 3  -CLAIRE      Post-Treatment Pain Level 3  -CLAIRE      Subjective Pain Comment Back is not too bad.  -CLAIRE         Aquatics    Aquatics performed? Yes  -CLAIRE         Aquatics LE    Water Walk forward;side;backward   At rail with SBA and supervision.  -CLAIRE      Stretch 1 Hamstrings (back to wall), min assist 20 sec  -CLAIRE      Stretch 2 Calf 20 sec ea  -CLAIRE      Stretch 3 Pelvic Smiles X 6 ea   -CLAIRE      Stretch Other 1 Seated LAQs X 5 ea  -CLAIRE      Stretch Other 2 LTR suspended X 5 ea  -CLAIRE      Vertical Traction Decompression break X 1 min.  -CLAIRE      Abdominals noodle   10X Ln   -CLAIRE      Hip Abd/Add 10 x ea  -CLAIRE      March in Place 10X holding rail B UEs  -CLAIRE      Mini Squat 10X  -CLAIRE      Toe/Heel Raises 15/15 B  -CLAIRE      Bicycle bench 25X   -CLAIRE      Flutter/Scissor 15/15  -CLAIRE        User Key  (r) = Recorded By, (t) = Taken By, (c) = Cosigned By    Initials Name Provider Type    CLAIRE Mayfield, PT Physical Therapist                                            Time Calculation:   Start Time: 0908  Stop Time: 0946  Time Calculation (min): 38 min  Total Timed Code Minutes- PT: 38 minute(s)  Therapy Suggested Charges     Code   Minutes Charges    None           Therapy Charges for Today     Code Description Service  Date Service Provider Modifiers Qty    [id number]  PT AQUATIC THERAPY EA 15 MIN 6/18/2018 Jay Mayfield, PT GP 3                    Jay Mayfield, PT  6/18/2018

## 2018-06-20 ENCOUNTER — HOSPITAL ENCOUNTER (OUTPATIENT)
Dept: PHYSICAL THERAPY | Facility: HOSPITAL | Age: 78
Setting detail: THERAPIES SERIES
Discharge: HOME | End: 2018-06-20

## 2018-06-20 DIAGNOSIS — R42 VERTIGO: ICD-10-CM

## 2018-06-20 DIAGNOSIS — G89.29 CHRONIC MIDLINE LOW BACK PAIN WITHOUT SCIATICA: Primary | ICD-10-CM

## 2018-06-20 DIAGNOSIS — M54.50 CHRONIC MIDLINE LOW BACK PAIN WITHOUT SCIATICA: Primary | ICD-10-CM

## 2018-06-20 DIAGNOSIS — Z98.1 HISTORY OF LUMBAR SPINAL FUSION: ICD-10-CM

## 2018-06-20 DIAGNOSIS — Z96.641 HISTORY OF TOTAL RIGHT HIP REPLACEMENT: ICD-10-CM

## 2018-06-20 DIAGNOSIS — R26.2 DIFFICULTY WALKING: ICD-10-CM

## 2018-06-20 PROCEDURE — 97113 AQUATIC THERAPY/EXERCISES: CPT | Performed by: PHYSICAL THERAPIST

## 2018-06-20 NOTE — THERAPY TREATMENT NOTE
Outpatient Physical Therapy Ortho Treatment Note  [facility]     Patient Name: Dee Watts  : 1940  MRN: [id number]  Today's Date: 2018      Visit Date: 2018    Visit Dx:    ICD-10-CM ICD-9-CM   1. Chronic midline low back pain without sciatica M54.5 724.2    G89.29 338.29   2. History of lumbar spinal fusion Z98.1 V45.4   3. Vertigo R42 780.4   4. History of total right hip replacement Z96.641 V43.64   5. Difficulty walking R26.2 719.7       Patient Active Problem List   Diagnosis   • DDD (degenerative disc disease), lumbar   • Osteoarthritis of hip   • Adult body mass index greater than 30   • Lumbosacral neuritis   • Lumbar canal stenosis   • Chronic right-sided low back pain without sciatica   • Hypothyroidism   • Hypertension   • Mood disorder   • Vitamin D deficiency   • IBS (irritable bowel syndrome)   • Osteoporosis   • Migraines   • GERD (gastroesophageal reflux disease)   • Insomnia   • Chronic breast pain   • Status post total hip replacement, right   • Right hip pain        Past Medical History:   Diagnosis Date   • Allergic rhinitis    • Anxiety    • Asthma    • DDD (degenerative disc disease), lumbar    • Depression    • GERD (gastroesophageal reflux disease)    • H/O bone density study 2012   • History of diverticulosis    • Hypertension    • Hypothyroidism    • IBS (irritable bowel syndrome)    • Insomnia    • Lumbar canal stenosis    • Lumbosacral neuritis    • Migraines    • Mood disorder    • Osteoarthritis    • Osteoporosis    • Thyroid goiter    • Vitamin D deficiency         Past Surgical History:   Procedure Laterality Date   • ANKLE SURGERY Left    • BACK SURGERY N/A 2005   • COLONOSCOPY N/A 2006   • HEMORRHOIDECTOMY N/A 1981   • HYSTERECTOMY N/A 1972   • PAP SMEAR N/A 10/31/2003   • THYROID CYST EXCISION      DATE NOT SPECIFIED   • WRIST SURGERY      LATERALITY AND DATE NOT SPECIFIED                             PT  Assessment/Plan     Row Name 06/20/18 0914          PT Assessment    Assessment Comments Increased back pain today. Spent more time with decompression which helped decrease her pain. She did have one spasm during session which also limited her exercise.  -CLAIRE       User Key  (r) = Recorded By, (t) = Taken By, (c) = Cosigned By    Initials Name Provider Type    CLAIRE Mayfield, PT Physical Therapist                    Exercises     Row Name 06/20/18 0900             Subjective Pain    Able to rate subjective pain? yes  -CLAIRE      Pre-Treatment Pain Level 7  -CLAIRE      Post-Treatment Pain Level 5  -CLAIRE      Subjective Pain Comment My back is really bothering me today, pain is 6-7/10.  -CLAIRE         Aquatics    Aquatics performed? Yes  -CLAIRE         Aquatics LE    Water Walk forward;side   At rail with supervision.  -CLAIRE      Stretch 1 Hamstrings (back to wall), min assist 20 sec  -CLAIRE      Stretch 2 --  -CLAIRE      Stretch 3 Pelvic Smiles X 3 ea   -CLAIRE      Stretch Other 1 Seated LAQs X 5 ea  -CLAIRE      Stretch Other 2 --  -CLAIRE      Vertical Traction Decompression break  several times throuout session  -CLAIRE      Abdominals --  -CLAIRE      Clams 10X suspendd  -CLAIRE      Hip Abd/Add --  -CLAIRE      March in Place 12X holding rail B UEs  -CLAIRE      Mini Squat --  -CLAIRE      Toe/Heel Raises --  -CLAIRE      Bicycle bench 25X   -CLAIRE      Flutter/Scissor 15/15  -CLAIRE        User Key  (r) = Recorded By, (t) = Taken By, (c) = Cosigned By    Initials Name Provider Type    CLAIRE Mayfield, PT Physical Therapist                             Therapy Education  Given: Symptoms/condition management (Cold pack)  Program: Reinforced  How Provided: Verbal, Demonstration  Provided to: Patient  Level of Understanding: Teach back education performed              Time Calculation:   Start Time: 0905  Stop Time: 0945  Time Calculation (min): 40 min  Total Timed Code Minutes- PT: 40 minute(s)  Therapy Suggested Charges     Code   Minutes Charges    None           Therapy  Charges for Today     Code Description Service Date Service Provider Modifiers Qty    94797223389 HC PT AQUATIC THERAPY EA 15 MIN 6/20/2018 Jay Mayfield, PT GP 3                    Jay Mayfield, PT  6/20/2018

## 2018-06-22 DIAGNOSIS — I10 ESSENTIAL HYPERTENSION: ICD-10-CM

## 2018-06-22 DIAGNOSIS — G89.29 CHRONIC RIGHT-SIDED LOW BACK PAIN WITHOUT SCIATICA: ICD-10-CM

## 2018-06-22 DIAGNOSIS — M54.50 CHRONIC RIGHT-SIDED LOW BACK PAIN WITHOUT SCIATICA: ICD-10-CM

## 2018-06-22 DIAGNOSIS — E03.9 ACQUIRED HYPOTHYROIDISM: ICD-10-CM

## 2018-06-22 RX ORDER — GABAPENTIN 800 MG/1
800 TABLET ORAL DAILY
Qty: 90 TABLET | Refills: 0 | OUTPATIENT
Start: 2018-06-22 | End: 2018-10-03 | Stop reason: SDUPTHER

## 2018-06-22 RX ORDER — LEVOTHYROXINE SODIUM 88 UG/1
88 TABLET ORAL EVERY OTHER DAY
Qty: 45 TABLET | Refills: 2 | Status: SHIPPED | OUTPATIENT
Start: 2018-06-22 | End: 2019-08-06 | Stop reason: SDUPTHER

## 2018-06-22 NOTE — TELEPHONE ENCOUNTER
----- Message from Carline Goins sent at 6/21/2018 12:17 PM EDT -----  Contact: Patient  Patient requesting refill on      gabapentin (NEURONTIN) 800 MG tablet  levothyroxine (SYNTHROID, LEVOTHROID) 88 MCG tablet  verapamil SR (CALAN-SR) 240 MG CR tablet    Patient:[phone]    Pharmacy:[facility] [address] [place] [address] AT [facility] [phone] [facility] [phone]

## 2018-06-22 NOTE — TELEPHONE ENCOUNTER
I spoke to the pharmacist at [facility] in [place] and he stated Ms. Watts has refills on file for Verapamil 240, and Levothyroxine 100 mcg.    When she come in today to  her refills on her Gabepentin and Levothyroxine 88 mcg. She can request to transfer the remaining of her prescriptions from the [facility] on [address] to the [facility] in [place].    I have informed Ms. Watts of this.    She stated she takes both 100 mcg and 88 mcg. She alternates every other day.

## 2018-07-03 ENCOUNTER — HOSPITAL ENCOUNTER (OUTPATIENT)
Dept: PHYSICAL THERAPY | Facility: HOSPITAL | Age: 78
Setting detail: THERAPIES SERIES
Discharge: HOME | End: 2018-07-03

## 2018-07-10 ENCOUNTER — HOSPITAL ENCOUNTER (OUTPATIENT)
Dept: PHYSICAL THERAPY | Facility: HOSPITAL | Age: 78
Setting detail: THERAPIES SERIES
Discharge: HOME | End: 2018-07-10

## 2018-07-10 DIAGNOSIS — R42 VERTIGO: ICD-10-CM

## 2018-07-10 DIAGNOSIS — M54.50 CHRONIC MIDLINE LOW BACK PAIN WITHOUT SCIATICA: Primary | ICD-10-CM

## 2018-07-10 DIAGNOSIS — Z96.641 HISTORY OF TOTAL RIGHT HIP REPLACEMENT: ICD-10-CM

## 2018-07-10 DIAGNOSIS — G89.29 CHRONIC MIDLINE LOW BACK PAIN WITHOUT SCIATICA: Primary | ICD-10-CM

## 2018-07-10 DIAGNOSIS — Z98.1 HISTORY OF LUMBAR SPINAL FUSION: ICD-10-CM

## 2018-07-10 DIAGNOSIS — R26.2 DIFFICULTY WALKING: ICD-10-CM

## 2018-07-10 PROCEDURE — 97113 AQUATIC THERAPY/EXERCISES: CPT | Performed by: PHYSICAL THERAPIST

## 2018-07-10 NOTE — THERAPY TREATMENT NOTE
Outpatient Physical Therapy Ortho Progress Note/Recertification  [facility]     Patient Name: Dee Watts  : 1940  MRN: [id number]  Today's Date: 7/10/2018      Visit Date: 07/10/2018    Patient Active Problem List   Diagnosis   • DDD (degenerative disc disease), lumbar   • Osteoarthritis of hip   • Adult body mass index greater than 30   • Lumbosacral neuritis   • Lumbar canal stenosis   • Chronic right-sided low back pain without sciatica   • Hypothyroidism   • Hypertension   • Mood disorder (CMS/HCC)   • Vitamin D deficiency   • IBS (irritable bowel syndrome)   • Osteoporosis   • Migraines   • GERD (gastroesophageal reflux disease)   • Insomnia   • Chronic breast pain   • Status post total hip replacement, right   • Right hip pain        Past Medical History:   Diagnosis Date   • Allergic rhinitis    • Anxiety    • Asthma    • DDD (degenerative disc disease), lumbar    • Depression    • GERD (gastroesophageal reflux disease)    • H/O bone density study 2012   • History of diverticulosis    • Hypertension    • Hypothyroidism    • IBS (irritable bowel syndrome)    • Insomnia    • Lumbar canal stenosis    • Lumbosacral neuritis    • Migraines    • Mood disorder (CMS/HCC)    • Osteoarthritis    • Osteoporosis    • Thyroid goiter    • Vitamin D deficiency         Past Surgical History:   Procedure Laterality Date   • ANKLE SURGERY Left    • BACK SURGERY N/A 2005   • COLONOSCOPY N/A 2006   • HEMORRHOIDECTOMY N/A 1981   • HYSTERECTOMY N/A 1972   • PAP SMEAR N/A 10/31/2003   • THYROID CYST EXCISION      DATE NOT SPECIFIED   • WRIST SURGERY      LATERALITY AND DATE NOT SPECIFIED       Visit Dx:     ICD-10-CM ICD-9-CM   1. Chronic midline low back pain without sciatica M54.5 724.2    G89.29 338.29   2. History of lumbar spinal fusion Z98.1 V45.4   3. Vertigo R42 780.4   4. History of total right hip replacement Z96.641 V43.64   5. Difficulty walking R26.2 719.7                                        PT OP Goals     Row Name 07/10/18 0900          PT Short Term Goals    STG Date to Achieve 06/11/18  -CLAIRE     STG 1 pt to amb safely and enter exit pool indep  -CLAIRE     STG 1 Progress Partially Met  -CLAIRE     STG 1 Progress Comments Enters pool independently, however exiting required HHA since lapse in Pt for 3 weeks.    -CLAIRE     STG 2 pt to tolerate 45 minutes aquatic therapy and state 4-12 hour post rx relief  -CLAIRE     STG 2 Progress Ongoing  -CLAIRE     STG 2 Progress Comments Pt rports feeling better for about 2 hours after PT.  -CLAIRE     STG 3 pt to note decrease in worse pain from 8 to < 6/10 after 4 weeks of rx  -CLAIRE     STG 3 Progress Ongoing  -CLAIRE     STG 4 MINDI to improve from 62 to < 52%   -CLAIRE     STG 4 Progress Ongoing  -CLAIRE     STG 4 Progress Comments 60%  -CLAIRE     STG 5 no falls or near falls during course of PT  -CLAIRE     STG 5 Progress Met  -CLAIRE        Long Term Goals    LTG Date to Achieve 07/11/18  -CLAIRE     LTG 1 pt to be able to benefit from mod level ex in the pool and have a plan in place for ongoing pool exercise.   -CLAIRE     LTG 1 Progress Met  -CLAIRE     LTG 1 Progress Comments [facility].   -CLAIRE     LTG 2 pt to not have increased vertigo with pool therapy  -CLAIRE     LTG 2 Progress Met  -CLAIRE     LTG 3 pt to be able to perform 5x sts test without vertigo and test < 15 seconds.   -CLAIRE     LTG 3 Progress Ongoing  -CLAIRE     LTG 3 Progress Comments 26.2 sec. no UE assist  -CLAIRE     LTG 4 pt to state 10-20% improvement in gait and balance confidence with aquatic therapy and home ex program.   -CLAIRE     LTG 4 Progress Partially Met  -CLAIRE     LTG 4 Progress Comments 10% improvement in walking, however not balance.  -CLAIRE     LTG 5 MINDI to improve to < 45% by dc  -CLAIRE     LTG 5 Progress Ongoing  -CLAIRE       User Key  (r) = Recorded By, (t) = Taken By, (c) = Cosigned By    Initials Name Provider Type    CLAIRE Mayfield, PT Physical Therapist                PT  Assessment/Plan     Row Name 07/10/18 1600          PT Assessment    Functional Limitations Decreased safety during functional activities;Limitations in functional capacity and performance;Performance in leisure activities;Performance in self-care ADL;Limitations in community activities;Limitation in home management;Impaired locomotion  -CLAIRE     Impairments Balance;Locomotion;Impaired muscle endurance;Muscle strength;Pain  -CLAIRE     Assessment Comments Hanna returns to PT after a 3 week lapse due to scheduling conflict and cancelling one appt.  She recently attended her grandson's wedding and used a wheelchair for primary mobility.  She reports a 10% improvement in her ability to walk, however no change in her balance is reported.  She states her balance continues to be affected by bouts of vertigo.  She has not had any vertigo during aquatic PT other than her first visit.  Oswestry score is 60%.  The 5 X sit to stand test was completed in 26.2 seconds without UE assist. Hanna reports a temporary decrease in her pain for 2 hours following treatments.  Today she required HHA and use of the railing to exit pool via 6 stairs.  [facility] the [facility] on her own and will require more education and training before she is independent.    -CLAIRE     Please refer to paper survey for additional self-reported information Yes  -CLAIRE     Rehab Potential Good  -CLAIRE     Patient/caregiver participated in establishment of treatment plan and goals Yes  -CLAIRE     Patient would benefit from skilled therapy intervention Yes  -CLAIRE        PT Plan    PT Frequency 2x/week  -CLAIRE     Predicted Duration of Therapy Intervention (Therapy Eval) 3-4 weeks or 6 sessions  -CLAIRE     Planned CPT's? PT AQUATIC THERAPY EA 15 MIN: 53829  -CLAIRE       User Key  (r) = Recorded By, (t) = Taken By, (c) = Cosigned By    Initials Name Provider Type    CLAIRE Mayfield, PT Physical Therapist                  Exercises      Row Name 07/10/18 0900             Subjective Comments    Subjective Comments Attended grandstar’s wedding last weekend, used a wheelchair for most of it or had someone at her side if walking.  -CLAIRE         Subjective Pain    Able to rate subjective pain? yes  -CLAIRE      Pre-Treatment Pain Level 4  -CLAIRE         Aquatics    Aquatics performed? Yes  -CLAIRE         Aquatics LE    Water Walk forward;side   At rail with supervision.  -CLAIRE      Stretch 1 Hamstrings (back to wall), min assist 20 sec  -CLAIRE      Stretch 2 Calf SN 20 sec X 2  -CLAIRE      Stretch 3 --  -CLAIRE      Stretch Other 1 Seated LAQs X 10 ea  -CLAIRE      Vertical Traction Decompression break  several times throuout session  -CLAIRE      Abdominals noodle   LN S 15  -CLAIRE      Clams --  -CLAIRE      Hip Abd/Add 10X ea  -CLAIRE      Hip Flex/Ext 10X ea  -CLAIRE      March in Place 15X holding rail B UEs  -CLAIRE      Mini Squat 15X  -CLAIRE      Toe/Heel Raises 15/15  -CLAIRE      Bicycle bench 25X   -CLAIRE      Flutter/Scissor 15/15  -CLAIRE        User Key  (r) = Recorded By, (t) = Taken By, (c) = Cosigned By    Initials Name Provider Type    CLAIRE Jay Mayfield, PT Physical Therapist                                  Time Calculation:     Therapy Suggested Charges     Code   Minutes Charges    None             Start Time: 0907  Stop Time: 0945  Time Calculation (min): 38 min  Total Timed Code Minutes- PT: 38 minute(s)     Therapy Charges for Today     Code Description Service Date Service Provider Modifiers Qty    73319693732 HC PT AQUATIC THERAPY EA 15 MIN 7/10/2018 Jay Mayfield, PT GP 3                    Jay Mayfield, PT  7/10/2018

## 2018-07-12 ENCOUNTER — HOSPITAL ENCOUNTER (OUTPATIENT)
Dept: PHYSICAL THERAPY | Facility: HOSPITAL | Age: 78
Setting detail: THERAPIES SERIES
Discharge: HOME | End: 2018-07-12

## 2018-07-12 DIAGNOSIS — Z98.1 HISTORY OF LUMBAR SPINAL FUSION: ICD-10-CM

## 2018-07-12 DIAGNOSIS — M54.50 CHRONIC MIDLINE LOW BACK PAIN WITHOUT SCIATICA: Primary | ICD-10-CM

## 2018-07-12 DIAGNOSIS — R42 VERTIGO: ICD-10-CM

## 2018-07-12 DIAGNOSIS — R26.2 DIFFICULTY WALKING: ICD-10-CM

## 2018-07-12 DIAGNOSIS — Z96.641 HISTORY OF TOTAL RIGHT HIP REPLACEMENT: ICD-10-CM

## 2018-07-12 DIAGNOSIS — G89.29 CHRONIC MIDLINE LOW BACK PAIN WITHOUT SCIATICA: Primary | ICD-10-CM

## 2018-07-12 PROCEDURE — 97113 AQUATIC THERAPY/EXERCISES: CPT | Performed by: PHYSICAL THERAPIST

## 2018-07-12 PROCEDURE — G8979 MOBILITY GOAL STATUS: HCPCS | Performed by: PHYSICAL THERAPIST

## 2018-07-12 PROCEDURE — G8978 MOBILITY CURRENT STATUS: HCPCS | Performed by: PHYSICAL THERAPIST

## 2018-07-23 ENCOUNTER — OFFICE VISIT (OUTPATIENT)
Dept: INTERNAL MEDICINE | Facility: CLINIC | Age: 78
End: 2018-07-23

## 2018-07-23 VITALS
DIASTOLIC BLOOD PRESSURE: 98 MMHG | HEIGHT: 61 IN | WEIGHT: 206.4 LBS | BODY MASS INDEX: 38.97 KG/M2 | SYSTOLIC BLOOD PRESSURE: 138 MMHG

## 2018-07-23 DIAGNOSIS — I10 ESSENTIAL HYPERTENSION: Primary | ICD-10-CM

## 2018-07-23 DIAGNOSIS — F39 MOOD DISORDER (HCC): ICD-10-CM

## 2018-07-23 DIAGNOSIS — E55.9 VITAMIN D DEFICIENCY: ICD-10-CM

## 2018-07-23 DIAGNOSIS — E03.9 ACQUIRED HYPOTHYROIDISM: ICD-10-CM

## 2018-07-23 DIAGNOSIS — R53.82 CHRONIC FATIGUE: ICD-10-CM

## 2018-07-23 PROCEDURE — 99214 OFFICE O/P EST MOD 30 MIN: CPT | Performed by: INTERNAL MEDICINE

## 2018-07-23 NOTE — PROGRESS NOTES
"Subjective   Dee Mac is a 78 y.o. female here for   Chief Complaint   Patient presents with   • Hypertension     6 month follow-up   • Back Pain     Gabapentin   • Hypothyroidism   • Hyperlipidemia   .    Vitals:    07/23/18 1601   BP: 138/98   BP Location: Left arm   Patient Position: Sitting   Cuff Size: Adult   Weight: 93.6 kg (206 lb 6.4 oz)   Height: 154.3 cm (60.75\")       Body mass index is 39.32 kg/m².    Hypertension   This is a chronic problem. The current episode started more than 1 year ago. The problem is unchanged. Associated symptoms include shortness of breath (with exertion (b/c back pain)). Pertinent negatives include no chest pain or palpitations.   Back Pain   This is a chronic problem. The current episode started more than 1 year ago. The problem occurs constantly. The problem has been gradually worsening since onset. Pertinent negatives include no chest pain or fever.   Hypothyroidism   This is a chronic problem. The current episode started more than 1 year ago. The problem occurs constantly. The problem has been unchanged. Associated symptoms include fatigue. Pertinent negatives include no chest pain, chills, coughing or fever.   Hyperlipidemia   This is a chronic problem. The current episode started more than 1 year ago. The problem is controlled. Recent lipid tests were reviewed and are normal. Exacerbating diseases include hypothyroidism. She has no history of diabetes. Associated symptoms include shortness of breath (with exertion (b/c back pain)). Pertinent negatives include no chest pain.        The following portions of the patient's history were reviewed and updated as appropriate: allergies, current medications, past social history and problem list.    Review of Systems   Constitutional: Positive for fatigue. Negative for chills and fever.   Respiratory: Positive for shortness of breath (with exertion (b/c back pain)). Negative for cough and wheezing.    Cardiovascular: " Negative for chest pain, palpitations and leg swelling.   Musculoskeletal: Positive for back pain.   Psychiatric/Behavioral: Positive for dysphoric mood (hates living at [facility]) and sleep disturbance. The patient is nervous/anxious.        Objective   Physical Exam   Constitutional: She appears well-developed and well-nourished. No distress.   Cardiovascular: Normal rate, regular rhythm and normal heart sounds.    Pulmonary/Chest: No respiratory distress. She has no wheezes. She has no rales. She exhibits no tenderness.   Musculoskeletal: She exhibits no edema.   Psychiatric: She has a normal mood and affect. Her behavior is normal.   Nursing note and vitals reviewed.      Assessment/Plan   Diagnoses and all orders for this visit:    Essential hypertension  Comments:  high today b/c no BP medication (normal at home) - need daily chk & call if bp over 140/90  Orders:  -     CBC Auto Differential; Future  -     Comprehensive Metabolic Panel; Future  -     Lipid Panel; Future    Vitamin D deficiency  Comments:  continue 5,000 units daily    Acquired hypothyroidism  Comments:  need rechk    Mood disorder (CMS/HCC)  Comments:  worse b/c living situation - need daily lexapro 15mg (she forgets to take med 2x weekly)  Orders:  -     TSH Rfx On Abnormal To Free T4; Future    Chronic fatigue  Comments:  need thyroid rechk

## 2018-07-25 ENCOUNTER — APPOINTMENT (OUTPATIENT)
Dept: PHYSICAL THERAPY | Facility: HOSPITAL | Age: 78
End: 2018-07-25

## 2018-07-31 ENCOUNTER — HOSPITAL ENCOUNTER (OUTPATIENT)
Dept: PHYSICAL THERAPY | Facility: HOSPITAL | Age: 78
Setting detail: THERAPIES SERIES
Discharge: HOME | End: 2018-07-31

## 2018-07-31 DIAGNOSIS — M54.50 CHRONIC MIDLINE LOW BACK PAIN WITHOUT SCIATICA: Primary | ICD-10-CM

## 2018-07-31 DIAGNOSIS — R42 VERTIGO: ICD-10-CM

## 2018-07-31 DIAGNOSIS — R26.2 DIFFICULTY WALKING: ICD-10-CM

## 2018-07-31 DIAGNOSIS — Z98.1 HISTORY OF LUMBAR SPINAL FUSION: ICD-10-CM

## 2018-07-31 DIAGNOSIS — Z96.641 HISTORY OF TOTAL RIGHT HIP REPLACEMENT: ICD-10-CM

## 2018-07-31 DIAGNOSIS — G89.29 CHRONIC MIDLINE LOW BACK PAIN WITHOUT SCIATICA: Primary | ICD-10-CM

## 2018-07-31 PROCEDURE — 97113 AQUATIC THERAPY/EXERCISES: CPT | Performed by: PHYSICAL THERAPIST

## 2018-08-02 ENCOUNTER — LAB (OUTPATIENT)
Dept: INTERNAL MEDICINE | Facility: CLINIC | Age: 78
End: 2018-08-02

## 2018-08-02 ENCOUNTER — HOSPITAL ENCOUNTER (OUTPATIENT)
Dept: PHYSICAL THERAPY | Facility: HOSPITAL | Age: 78
Setting detail: THERAPIES SERIES
Discharge: HOME | End: 2018-08-02

## 2018-08-02 DIAGNOSIS — G89.29 CHRONIC MIDLINE LOW BACK PAIN WITHOUT SCIATICA: Primary | ICD-10-CM

## 2018-08-02 DIAGNOSIS — R26.2 DIFFICULTY WALKING: ICD-10-CM

## 2018-08-02 DIAGNOSIS — M54.50 CHRONIC MIDLINE LOW BACK PAIN WITHOUT SCIATICA: Primary | ICD-10-CM

## 2018-08-02 DIAGNOSIS — Z96.641 HISTORY OF TOTAL RIGHT HIP REPLACEMENT: ICD-10-CM

## 2018-08-02 DIAGNOSIS — Z98.1 HISTORY OF LUMBAR SPINAL FUSION: ICD-10-CM

## 2018-08-02 DIAGNOSIS — I10 ESSENTIAL HYPERTENSION: ICD-10-CM

## 2018-08-02 DIAGNOSIS — F39 MOOD DISORDER (HCC): ICD-10-CM

## 2018-08-02 DIAGNOSIS — R42 VERTIGO: ICD-10-CM

## 2018-08-02 LAB
ALBUMIN SERPL-MCNC: 4.3 G/DL (ref 3.5–5.2)
ALBUMIN/GLOB SERPL: 1.5 G/DL
ALP SERPL-CCNC: 56 U/L (ref 39–117)
ALT SERPL-CCNC: 14 U/L (ref 1–33)
AST SERPL-CCNC: 14 U/L (ref 1–32)
BASOPHILS # BLD AUTO: 0.03 10*3/MM3 (ref 0–0.2)
BASOPHILS NFR BLD AUTO: 0.4 % (ref 0–2)
BILIRUB SERPL-MCNC: 0.2 MG/DL (ref 0.1–1.2)
BUN SERPL-MCNC: 17 MG/DL (ref 8–23)
BUN/CREAT SERPL: 17.7 (ref 7–25)
CALCIUM SERPL-MCNC: 10.8 MG/DL (ref 8.6–10.5)
CHLORIDE SERPL-SCNC: 105 MMOL/L (ref 98–107)
CHOLEST SERPL-MCNC: 210 MG/DL (ref 0–200)
CO2 SERPL-SCNC: 22.2 MMOL/L (ref 22–29)
CREAT SERPL-MCNC: 0.96 MG/DL (ref 0.57–1)
DEPRECATED RDW RBC AUTO: 44.5 FL (ref 37–54)
EOSINOPHIL # BLD AUTO: 0.2 10*3/MM3 (ref 0–0.7)
EOSINOPHIL NFR BLD AUTO: 2.4 % (ref 0–5)
ERYTHROCYTE [DISTWIDTH] IN BLOOD BY AUTOMATED COUNT: 13.3 % (ref 11.5–15)
GLOBULIN SER CALC-MCNC: 2.9 GM/DL
GLUCOSE SERPL-MCNC: 109 MG/DL (ref 65–99)
HCT VFR BLD AUTO: 43.6 % (ref 34.1–44.9)
HDLC SERPL-MCNC: 47 MG/DL (ref 40–60)
HGB BLD-MCNC: 14.3 G/DL (ref 11.2–15.7)
LDLC SERPL CALC-MCNC: 133 MG/DL (ref 0–100)
LYMPHOCYTES # BLD AUTO: 2 10*3/MM3 (ref 0.8–7)
LYMPHOCYTES NFR BLD AUTO: 24.4 % (ref 10–60)
MCH RBC QN AUTO: 30.4 PG (ref 26–34)
MCHC RBC AUTO-ENTMCNC: 32.8 G/DL (ref 31–37)
MCV RBC AUTO: 92.8 FL (ref 80–100)
MONOCYTES # BLD AUTO: 0.58 10*3/MM3 (ref 0–1)
MONOCYTES NFR BLD AUTO: 7.1 % (ref 0–13)
NEUTROPHILS # BLD AUTO: 5.4 10*3/MM3 (ref 1–11)
NEUTROPHILS NFR BLD AUTO: 65.7 % (ref 30–85)
PLATELET # BLD AUTO: 234 10*3/MM3 (ref 150–450)
PMV BLD AUTO: 12.5 FL (ref 6–12)
POTASSIUM SERPL-SCNC: 4.2 MMOL/L (ref 3.5–5.2)
PROT SERPL-MCNC: 7.2 G/DL (ref 6–8.5)
RBC # BLD AUTO: 4.7 10*6/MM3 (ref 3.93–5.22)
SODIUM SERPL-SCNC: 144 MMOL/L (ref 136–145)
T4 FREE SERPL-MCNC: 1.03 NG/DL (ref 0.93–1.7)
TRIGL SERPL-MCNC: 148 MG/DL (ref 0–150)
TSH SERPL-ACNC: 4.59 MIU/ML (ref 0.27–4.2)
VLDLC SERPL-MCNC: 29.6 MG/DL (ref 5–40)
WBC NRBC COR # BLD: 8.21 10*3/MM3 (ref 5–10)

## 2018-08-02 PROCEDURE — 36415 COLL VENOUS BLD VENIPUNCTURE: CPT | Performed by: INTERNAL MEDICINE

## 2018-08-02 PROCEDURE — 97113 AQUATIC THERAPY/EXERCISES: CPT | Performed by: PHYSICAL THERAPIST

## 2018-08-02 PROCEDURE — 85025 COMPLETE CBC W/AUTO DIFF WBC: CPT | Performed by: INTERNAL MEDICINE

## 2018-08-02 NOTE — THERAPY TREATMENT NOTE
Outpatient Physical Therapy Ortho Treatment Note  [facility]     Patient Name: Dee Watts  : 1940  MRN: [id number]  Today's Date: 2018      Visit Date: 2018    Visit Dx:    ICD-10-CM ICD-9-CM   1. Chronic midline low back pain without sciatica M54.5 724.2    G89.29 338.29   2. History of lumbar spinal fusion Z98.1 V45.4   3. Vertigo R42 780.4   4. History of total right hip replacement Z96.641 V43.64   5. Difficulty walking R26.2 719.7       Patient Active Problem List   Diagnosis   • DDD (degenerative disc disease), lumbar   • Osteoarthritis of hip   • Adult body mass index greater than 30   • Lumbosacral neuritis   • Lumbar canal stenosis   • Chronic right-sided low back pain without sciatica   • Hypothyroidism   • Hypertension   • Mood disorder (CMS/HCC)   • Vitamin D deficiency   • IBS (irritable bowel syndrome)   • Osteoporosis   • Migraines   • GERD (gastroesophageal reflux disease)   • Insomnia   • Chronic breast pain   • Status post total hip replacement, right   • Right hip pain   • Chronic fatigue        Past Medical History:   Diagnosis Date   • Allergic rhinitis    • Anxiety    • Asthma    • DDD (degenerative disc disease), lumbar    • Depression    • GERD (gastroesophageal reflux disease)    • H/O bone density study 2012   • History of diverticulosis    • Hypertension    • Hypothyroidism    • IBS (irritable bowel syndrome)    • Insomnia    • Lumbar canal stenosis    • Lumbosacral neuritis    • Migraines    • Mood disorder (CMS/HCC)    • Osteoarthritis    • Osteoporosis    • Thyroid goiter    • Vitamin D deficiency         Past Surgical History:   Procedure Laterality Date   • ANKLE SURGERY Left    • BACK SURGERY N/A 2005   • COLONOSCOPY N/A 2006   • HEMORRHOIDECTOMY N/A 1981   • HYSTERECTOMY N/A 1972   • PAP SMEAR N/A 10/31/2003   • THYROID CYST EXCISION      DATE NOT SPECIFIED   • WRIST SURGERY      LATERALITY AND DATE NOT SPECIFIED                              PT Assessment/Plan     Row Name 08/02/18 0949          PT Assessment    Assessment Comments Provided printed instructions of aquatic exercises with pictures to improve independence as Hanna plans to join the pool after PT discharge and continue on her own.  Hanna demonstrates near independence with the prescribed program.  -CLAIRE       User Key  (r) = Recorded By, (t) = Taken By, (c) = Cosigned By    Initials Name Provider Type    Jay Espinoza, PT Physical Therapist                    Exercises     Row Name 08/02/18 0900             Subjective Comments    Subjective Comments Having fasting blood test this morning at 10:45 so hasn’t had any food or drink this morning.  -CLAIRE         Subjective Pain    Able to rate subjective pain? yes  -CLAIRE      Pre-Treatment Pain Level 3  -CLAIRE      Post-Treatment Pain Level 3  -CLAIRE         Aquatics    Aquatics performed? Yes  -CLAIRE         Aquatics LE    Water Walk forward;side   At rail with supervision  -CLAIRE      Stretch 1 Hamstrings (back to wall), SN 20 sec X 2, (no noodle)  -CLAIRE      Stretch 2 Calf  20 sec X 2  -CLAIRE      Stretch 3 Wall walk, BKTC stretch 20 sec   -CLAIRE      Stretch Other 1 Seated LAQs X 20 ea  -CLAIRE      Stretch Other 2 Shldr Horiz Abd/Add, back to wall X 20  -CLAIRE      Abdominals noodle   LN X20  -CLAIRE      Clams 20X suspended  -CLAIRE      Hip Abd/Add 20X ea  -CLAIRE      Hip Flex/Ext 20X ea  -CLAIRE      March in Place 10X holding rail one UEs  -CLAIRE      Mini Squat 20X  -CLAIRE      Toe/Heel Raises 20/20  -CLAIRE      Uni-Clock 10X ea  -CLAIRE      Bicycle independent  -CLAIRE      Flutter/Scissor 20/20 seated  -CLAIRE        User Key  (r) = Recorded By, (t) = Taken By, (c) = Cosigned By    Initials Name Provider Type    Jay Espinoza, PT Physical Therapist                                            Time Calculation:   Start Time: 0935  Stop Time: 1015  Time Calculation (min): 40 min  Total Timed Code Minutes- PT: 40 minute(s)  Therapy Suggested Charges     Code   Minutes  Charges    None           Therapy Charges for Today     Code Description Service Date Service Provider Modifiers Qty    77542245395 HC PT AQUATIC THERAPY EA 15 MIN 8/2/2018 Jay Mayfield, PT GP 3                    Jay Mayfield, PT  8/2/2018

## 2018-08-03 NOTE — PROGRESS NOTES
Discussed - High sugar/cholesterol/fat - need low fat/sugar diet & more exercise. High calcium - rechk sev mos.  Low thyroid - need to increase synthroid from 88 mcg daily to alternating with 100 mcg qod - recheck TSH in 3 mos.

## 2018-08-07 ENCOUNTER — HOSPITAL ENCOUNTER (OUTPATIENT)
Dept: PHYSICAL THERAPY | Facility: HOSPITAL | Age: 78
Setting detail: THERAPIES SERIES
Discharge: HOME | End: 2018-08-07

## 2018-08-07 DIAGNOSIS — R42 VERTIGO: ICD-10-CM

## 2018-08-07 DIAGNOSIS — R26.2 DIFFICULTY WALKING: ICD-10-CM

## 2018-08-07 DIAGNOSIS — G89.29 CHRONIC MIDLINE LOW BACK PAIN WITHOUT SCIATICA: Primary | ICD-10-CM

## 2018-08-07 DIAGNOSIS — Z96.641 HISTORY OF TOTAL RIGHT HIP REPLACEMENT: ICD-10-CM

## 2018-08-07 DIAGNOSIS — Z98.1 HISTORY OF LUMBAR SPINAL FUSION: ICD-10-CM

## 2018-08-07 DIAGNOSIS — M54.50 CHRONIC MIDLINE LOW BACK PAIN WITHOUT SCIATICA: Primary | ICD-10-CM

## 2018-08-07 PROCEDURE — 97113 AQUATIC THERAPY/EXERCISES: CPT | Performed by: PHYSICAL THERAPIST

## 2018-08-07 NOTE — THERAPY TREATMENT NOTE
Outpatient Physical Therapy Ortho Treatment Note  [facility]     Patient Name: Dee Watts  : 1940  MRN: [id number]  Today's Date: 2018      Visit Date: 2018    Visit Dx:    ICD-10-CM ICD-9-CM   1. Chronic midline low back pain without sciatica M54.5 724.2    G89.29 338.29   2. History of lumbar spinal fusion Z98.1 V45.4   3. Vertigo R42 780.4   4. History of total right hip replacement Z96.641 V43.64   5. Difficulty walking R26.2 719.7       Patient Active Problem List   Diagnosis   • DDD (degenerative disc disease), lumbar   • Osteoarthritis of hip   • Adult body mass index greater than 30   • Lumbosacral neuritis   • Lumbar canal stenosis   • Chronic right-sided low back pain without sciatica   • Hypothyroidism   • Hypertension   • Mood disorder (CMS/HCC)   • Vitamin D deficiency   • IBS (irritable bowel syndrome)   • Osteoporosis   • Migraines   • GERD (gastroesophageal reflux disease)   • Insomnia   • Chronic breast pain   • Status post total hip replacement, right   • Right hip pain   • Chronic fatigue        Past Medical History:   Diagnosis Date   • Allergic rhinitis    • Anxiety    • Asthma    • DDD (degenerative disc disease), lumbar    • Depression    • GERD (gastroesophageal reflux disease)    • H/O bone density study 2012   • History of diverticulosis    • Hypertension    • Hypothyroidism    • IBS (irritable bowel syndrome)    • Insomnia    • Lumbar canal stenosis    • Lumbosacral neuritis    • Migraines    • Mood disorder (CMS/HCC)    • Osteoarthritis    • Osteoporosis    • Thyroid goiter    • Vitamin D deficiency         Past Surgical History:   Procedure Laterality Date   • ANKLE SURGERY Left    • BACK SURGERY N/A 2005   • COLONOSCOPY N/A 2006   • HEMORRHOIDECTOMY N/A 1981   • HYSTERECTOMY N/A 1972   • PAP SMEAR N/A 10/31/2003   • THYROID CYST EXCISION      DATE NOT SPECIFIED   • WRIST SURGERY      LATERALITY AND DATE NOT SPECIFIED                              PT Assessment/Plan     Row Name 08/07/18 0939          PT Assessment    Assessment Comments Hanna continues to improve balance and stability in pool environment.  She is now able to enter and exit pool independently via stairs and rail and complete water walking holding rail with supervision.  In addition, Hanna demonstrates improved balance and core stability with free standing UE exercise without support of wall.  Planning for discharge  after next appt. (in 2 days). [facility] independently.  -CLAIRE       User Key  (r) = Recorded By, (t) = Taken By, (c) = Cosigned By    Initials Name Provider Type    Jay Espinoza, PT Physical Therapist                    Exercises     Row Name 08/07/18 0900             Subjective Pain    Able to rate subjective pain? yes  -CLAIRE      Pre-Treatment Pain Level 6  -CLAIRE      Post-Treatment Pain Level 6  -CLAIRE      Subjective Pain Comment R hip hurting today- I think it's coming from my back.  -CLAIRE         Aquatics    Aquatics performed? Yes  -CLAIRE         Aquatics LE    Water Walk forward;side   At rail with supervision  -CLAIRE      Stretch 1 Hamstrings (back to wall), 20 sec X 2, (no noodle)  -CLAIRE      Stretch 2 Calf  20 sec X 2  -CLAIRE      Stretch 3 Wall walk, BKTC stretch 30 sec X 2  -CLAIRE      Stretch Other 1 Seated LAQs X 20 ea  -CLAIRE      Stretch Other 2 Shldr Horiz Abd/Add, back to wall X 20  -CLAIRE      Abdominals noodle   LN X20  -CLAIRE      Clams 20X suspended  -CLAIRE      Hip Abd/Add 20X ea  -CLAIRE      Hip Flex/Ext 20X ea  -CLAIRE      March in Place 15X holding rail one UEs  -CLAIRE      Mini Squat 20X  -CLAIRE      Toe/Heel Raises 20/20  -CLAIRE      Uni-Clock 10X ea  -CLAIRE      Bicycle independent  -CLAIRE      Flutter/Scissor 20/20 seated  -CLAIRE        User Key  (r) = Recorded By, (t) = Taken By, (c) = Cosigned By    Initials Name Provider Type    Jay Espinoza, PT Physical Therapist                               PT OP Goals      Row Name 08/07/18 0900          PT Short Term Goals    STG Date to Achieve 06/11/18  -CLAIRE     STG 1 pt to amb safely and enter exit pool indep  -CLAIRE     STG 1 Progress Met  -CLAIRE     STG 2 pt to tolerate 45 minutes aquatic therapy and state 4-12 hour post rx relief  -CLAIRE     STG 2 Progress Progressing  -CLAIRE     STG 3 pt to note decrease in worse pain from 8 to < 6/10 after 4 weeks of rx  -CLAIRE     STG 3 Progress Ongoing  -CLAIRE     STG 4 MINDI to improve from 62 to < 52%   -CLAIRE     STG 4 Progress Ongoing  -CLAIRE     STG 5 no falls or near falls during course of PT  -CLAIRE     STG 5 Progress Met  -CLAIRE        Long Term Goals    LTG Date to Achieve 07/11/18  -CLAIRE     LTG 1 pt to be able to benefit from mod level ex in the pool and have a plan in place for ongoing pool exercise.   -CLAIRE     LTG 1 Progress Met  -CLAIRE     LTG 2 pt to not have increased vertigo with pool therapy  -CLAIRE     LTG 2 Progress Met  -CLAIRE     LTG 3 pt to be able to perform 5x sts test without vertigo and test < 15 seconds.   -CLAIRE     LTG 3 Progress Ongoing  -CLAIRE     LTG 4 pt to state 10-20% improvement in gait and balance confidence with aquatic therapy and home ex program.   -CLAIRE     LTG 4 Progress Partially Met  -CLAIRE     LTG 5 MINDI to improve to < 45% by dc  -CLAIRE     LTG 5 Progress Ongoing  -CLAIRE       User Key  (r) = Recorded By, (t) = Taken By, (c) = Cosigned By    Initials Name Provider Type    Jay Espinoza, PT Physical Therapist                         Time Calculation:   Start Time: 0900  Stop Time: 0930  Time Calculation (min): 30 min  Total Timed Code Minutes- PT: 28 minute(s)  Therapy Suggested Charges     Code   Minutes Charges    None           Therapy Charges for Today     Code Description Service Date Service Provider Modifiers Qty    20362910101 HC PT AQUATIC THERAPY EA 15 MIN 8/7/2018 Jay Mayfield, PT GP 2                    Jay Mayfield, PT  8/7/2018

## 2018-08-09 ENCOUNTER — APPOINTMENT (OUTPATIENT)
Dept: PHYSICAL THERAPY | Facility: HOSPITAL | Age: 78
End: 2018-08-09

## 2018-10-03 DIAGNOSIS — M54.50 CHRONIC RIGHT-SIDED LOW BACK PAIN WITHOUT SCIATICA: ICD-10-CM

## 2018-10-03 DIAGNOSIS — G89.29 CHRONIC RIGHT-SIDED LOW BACK PAIN WITHOUT SCIATICA: ICD-10-CM

## 2018-10-03 DIAGNOSIS — R21 RASH: ICD-10-CM

## 2018-10-08 RX ORDER — VERAPAMIL HYDROCHLORIDE 240 MG/1
TABLET, FILM COATED, EXTENDED RELEASE ORAL
Qty: 90 TABLET | Refills: 1 | Status: SHIPPED | OUTPATIENT
Start: 2018-10-08 | End: 2019-05-24

## 2018-10-08 RX ORDER — GABAPENTIN 800 MG/1
TABLET ORAL
Qty: 90 TABLET | Refills: 0 | OUTPATIENT
Start: 2018-10-08 | End: 2019-12-05 | Stop reason: SDUPTHER

## 2018-10-08 RX ORDER — BACLOFEN 10 MG/1
TABLET ORAL
Qty: 180 TABLET | Refills: 1 | Status: SHIPPED | OUTPATIENT
Start: 2018-10-08 | End: 2019-07-17 | Stop reason: SDUPTHER

## 2018-10-08 RX ORDER — BACLOFEN 10 MG/1
TABLET ORAL
Qty: 60 TABLET | Refills: 1 | Status: SHIPPED | OUTPATIENT
Start: 2018-10-08 | End: 2018-10-08 | Stop reason: SDUPTHER

## 2018-10-16 ENCOUNTER — DOCUMENTATION (OUTPATIENT)
Dept: PHYSICAL THERAPY | Facility: HOSPITAL | Age: 78
End: 2018-10-16

## 2018-10-16 DIAGNOSIS — R42 VERTIGO: ICD-10-CM

## 2018-10-16 DIAGNOSIS — G89.29 CHRONIC MIDLINE LOW BACK PAIN WITHOUT SCIATICA: Primary | ICD-10-CM

## 2018-10-16 DIAGNOSIS — Z98.1 HISTORY OF LUMBAR SPINAL FUSION: ICD-10-CM

## 2018-10-16 DIAGNOSIS — M54.50 CHRONIC MIDLINE LOW BACK PAIN WITHOUT SCIATICA: Primary | ICD-10-CM

## 2018-10-16 DIAGNOSIS — R26.2 DIFFICULTY WALKING: ICD-10-CM

## 2018-10-16 NOTE — THERAPY DISCHARGE NOTE
Outpatient Physical Therapy Discharge Summary         Patient Name: Dee Watts  : 1940  MRN: [id number]    Today's Date: 10/16/2018    Visit Dx:    ICD-10-CM ICD-9-CM   1. Chronic midline low back pain without sciatica M54.5 724.2    G89.29 338.29   2. History of lumbar spinal fusion Z98.1 V45.4   3. Vertigo R42 780.4   4. Difficulty walking R26.2 719.7             PT OP Goals     Row Name 10/16/18 0900          PT Short Term Goals    STG Date to Achieve 18  -     STG 1 pt to amb safely and enter exit pool indep  -     STG 1 Progress Met  -     STG 2 pt to tolerate 45 minutes aquatic therapy and state 4-12 hour post rx relief  -     STG 2 Progress Not Met  -     STG 3 pt to note decrease in worse pain from 8 to < 6/10 after 4 weeks of rx  -     STG 3 Progress Not Met  -     STG 4 MNIDI to improve from 62 to < 52%   -     STG 4 Progress Not Met  -     STG 5 no falls or near falls during course of PT  -     STG 5 Progress Not Met  -     STG 5 Progress Comments fell in bathroom, cancelled last visit  -        Long Term Goals    LTG Date to Achieve 18  -     LTG 1 pt to be able to benefit from mod level ex in the pool and have a plan in place for ongoing pool exercise.   -     LTG 1 Progress Met  -     LTG 2 pt to not have increased vertigo with pool therapy  -     LTG 2 Progress Met  -     LTG 3 pt to be able to perform 5x sts test without vertigo and test < 15 seconds.   -     LTG 3 Progress Not Met  -     LTG 4 pt to state 10-20% improvement in gait and balance confidence with aquatic therapy and home ex program.   -     LTG 4 Progress Partially Met  -     LTG 5 MINDI to improve to < 45% by dc  -     LTG 5 Progress Not Met  -       User Key  (r) = Recorded By, (t) = Taken By, (c) = Cosigned By    Initials Name Provider Type    Marjorie Spencer, PT Physical Therapist          OP PT Discharge Summary  Date of Discharge: 10/16/18  Reason for  Discharge: Maximum functional potential achieved  Outcomes Achieved: Patient able to partially acheive established goals  Discharge Destination: Home with home program      Time Calculation:        Therapy Suggested Charges     Code   Minutes Charges    None                       Marjorie Walsh, PT  10/16/2018

## 2018-12-05 ENCOUNTER — TELEPHONE (OUTPATIENT)
Dept: INTERNAL MEDICINE | Facility: CLINIC | Age: 78
End: 2018-12-05

## 2018-12-05 NOTE — TELEPHONE ENCOUNTER
----- Message from Carline Goins sent at 12/5/2018 11:12 AM EST -----  Contact: Patient  Patient calling would like to see if DR. Mascorro could send [facility] a [facility]. States she can not do water therapy in the winter months because she gets sick every time. Please advise    Patient:[phone]    [facility] Fax:[phone]

## 2018-12-07 NOTE — TELEPHONE ENCOUNTER
Ms. Watts was informed that the letter was typed up and will be at the front dest for her to come by next week to .

## 2019-01-16 DIAGNOSIS — M54.50 CHRONIC RIGHT-SIDED LOW BACK PAIN WITHOUT SCIATICA: ICD-10-CM

## 2019-01-16 DIAGNOSIS — G89.29 CHRONIC RIGHT-SIDED LOW BACK PAIN WITHOUT SCIATICA: ICD-10-CM

## 2019-01-17 RX ORDER — GABAPENTIN 800 MG/1
TABLET ORAL
Qty: 90 TABLET | Refills: 0 | OUTPATIENT
Start: 2019-01-17

## 2019-01-17 NOTE — TELEPHONE ENCOUNTER
Denied refill for Gabapentin 800 mg. Patient need an appointment with provider and refill can be filled at the time of appointment.     2 canceled appointments in a row and last OV was 7/23/18.

## 2019-01-28 DIAGNOSIS — E03.9 ACQUIRED HYPOTHYROIDISM: ICD-10-CM

## 2019-01-28 RX ORDER — LEVOTHYROXINE SODIUM 0.1 MG/1
100 TABLET ORAL DAILY
Qty: 90 TABLET | Refills: 0 | Status: SHIPPED | OUTPATIENT
Start: 2019-01-28 | End: 2019-08-06 | Stop reason: SDUPTHER

## 2019-03-29 NOTE — THERAPY TREATMENT NOTE
Outpatient Physical Therapy Ortho Treatment Note  [facility]     Patient Name: Dee Watts  : 1940  MRN: [id number]  Today's Date: 2018      Visit Date: 2018    Visit Dx:    ICD-10-CM ICD-9-CM   1. Chronic midline low back pain without sciatica M54.5 724.2    G89.29 338.29   2. History of lumbar spinal fusion Z98.1 V45.4   3. Vertigo R42 780.4   4. History of total right hip replacement Z96.641 V43.64   5. Difficulty walking R26.2 719.7       Patient Active Problem List   Diagnosis   • DDD (degenerative disc disease), lumbar   • Osteoarthritis of hip   • Adult body mass index greater than 30   • Lumbosacral neuritis   • Lumbar canal stenosis   • Chronic right-sided low back pain without sciatica   • Hypothyroidism   • Hypertension   • Mood disorder (CMS/HCC)   • Vitamin D deficiency   • IBS (irritable bowel syndrome)   • Osteoporosis   • Migraines   • GERD (gastroesophageal reflux disease)   • Insomnia   • Chronic breast pain   • Status post total hip replacement, right   • Right hip pain        Past Medical History:   Diagnosis Date   • Allergic rhinitis    • Anxiety    • Asthma    • DDD (degenerative disc disease), lumbar    • Depression    • GERD (gastroesophageal reflux disease)    • H/O bone density study 2012   • History of diverticulosis    • Hypertension    • Hypothyroidism    • IBS (irritable bowel syndrome)    • Insomnia    • Lumbar canal stenosis    • Lumbosacral neuritis    • Migraines    • Mood disorder (CMS/HCC)    • Osteoarthritis    • Osteoporosis    • Thyroid goiter    • Vitamin D deficiency         Past Surgical History:   Procedure Laterality Date   • ANKLE SURGERY Left    • BACK SURGERY N/A 2005   • COLONOSCOPY N/A 2006   • HEMORRHOIDECTOMY N/A 1981   • HYSTERECTOMY N/A 1972   • PAP SMEAR N/A 10/31/2003   • THYROID CYST EXCISION      DATE NOT SPECIFIED   • WRIST SURGERY      LATERALITY AND DATE NOT SPECIFIED                            Touched base w/ MD and he wants magnesium started and to be started shortly w/ oncoming nurse     PT Assessment/Plan     Row Name 07/12/18 0907          PT Assessment    Assessment Comments Hold on heel raises and standing DF due to c/o's of right foot pain since last session per patient report. Hanna did not tolerate full session due to her back pain and requested to stop session early.  -CLAIRE       User Key  (r) = Recorded By, (t) = Taken By, (c) = Cosigned By    Initials Name Provider Type    CLAIRE Mayfield, PT Physical Therapist                    Exercises     Row Name 07/12/18 0900             Subjective Pain    Able to rate subjective pain? yes  -CLAIRE      Pre-Treatment Pain Level 6  -CLARIE      Post-Treatment Pain Level 7  -CLAIRE      Subjective Pain Comment My right foot really bothered me after the last session here. I ma going to see a foot doctor about it. I think it was from the exercise going up on my toes and back on my heels.  -CLAIRE         Aquatics    Aquatics performed? Yes  -CLAIRE         Aquatics LE    Water Walk forward;side   At rail with supervision.  -CLAIRE      Stretch 1 Hamstrings (back to wall), min assist 20 sec  -CLAIRE      Stretch 2 Piriformis with assist 20 sec X 2 ea  -CLAIRE      Stretch Other 1 --  -CLAIRE      Vertical Traction Decompression break on Lg noodle holding rail, knees flexed X 5 min  -CLAIRE      Abdominals --  -CLAIRE      Hip Abd/Add --  -CLAIRE      Hip Flex/Ext --  -CLAIRE      March in Place --  -CLAIRE      Mini Squat --  -CLAIRE      Step Ups Seated LAQs X 15 ea  -CLAIRE      Bicycle bench 25X   -CLAIRE      Flutter/Scissor 15/15  -CLAIRE        User Key  (r) = Recorded By, (t) = Taken By, (c) = Cosigned By    Initials Name Provider Type    CLAIRE Mayfield, PT Physical Therapist                                  Outcome Measure Options: Padmini Sosa         Time Calculation:   Start Time: 0900  Stop Time: 0930  Time Calculation (min): 30 min  Total Timed Code Minutes- PT: 23 minute(s)  Therapy Suggested Charges     Code   Minutes Charges    None           Therapy Charges for Today     Code Description Service Date  Service Provider Modifiers Qty    22981391916 HC PT MOBILITY CURRENT 7/12/2018 Jay Mayfield, PT GP, CL 1    97498793766 HC PT MOBILITY PROJECTED 7/12/2018 Jay Mayfield, PT GP, CK 1    16937261528 HC PT AQUATIC THERAPY EA 15 MIN 7/12/2018 Jay Mayfield, PT GP 2          PT G-Codes  Outcome Measure Options: Modifed Owestry  Score: MINDI 60%  Functional Limitation: Mobility: Walking and moving around  Mobility: Walking and Moving Around Current Status (): At least 60 percent but less than 80 percent impaired, limited or restricted  Mobility: Walking and Moving Around Goal Status (): At least 40 percent but less than 60 percent impaired, limited or restricted         Jay Mayfield, PT  7/12/2018

## 2019-05-24 ENCOUNTER — TELEPHONE (OUTPATIENT)
Dept: INTERNAL MEDICINE | Facility: CLINIC | Age: 79
End: 2019-05-24

## 2019-05-24 ENCOUNTER — HOSPITAL ENCOUNTER (EMERGENCY)
Facility: HOSPITAL | Age: 79
Discharge: HOME | End: 2019-05-24
Attending: EMERGENCY MEDICINE | Admitting: EMERGENCY MEDICINE

## 2019-05-24 VITALS
RESPIRATION RATE: 16 BRPM | BODY MASS INDEX: 38.86 KG/M2 | WEIGHT: 205.8 LBS | TEMPERATURE: 98 F | HEIGHT: 61 IN | HEART RATE: 79 BPM | OXYGEN SATURATION: 96 % | SYSTOLIC BLOOD PRESSURE: 162 MMHG | DIASTOLIC BLOOD PRESSURE: 95 MMHG

## 2019-05-24 DIAGNOSIS — N30.00 ACUTE CYSTITIS WITHOUT HEMATURIA: ICD-10-CM

## 2019-05-24 DIAGNOSIS — R51.9 ACUTE NONINTRACTABLE HEADACHE, UNSPECIFIED HEADACHE TYPE: Primary | ICD-10-CM

## 2019-05-24 LAB
ANION GAP SERPL CALCULATED.3IONS-SCNC: 14.5 MMOL/L
BACTERIA UR QL AUTO: ABNORMAL /HPF
BASOPHILS # BLD AUTO: 0.05 10*3/MM3 (ref 0–0.2)
BASOPHILS NFR BLD AUTO: 0.5 % (ref 0–1.5)
BILIRUB UR QL STRIP: NEGATIVE
BUN BLD-MCNC: 20 MG/DL (ref 8–23)
BUN/CREAT SERPL: 22.2 (ref 7–25)
CALCIUM SPEC-SCNC: 10.1 MG/DL (ref 8.6–10.5)
CHLORIDE SERPL-SCNC: 103 MMOL/L (ref 98–107)
CLARITY UR: CLEAR
CO2 SERPL-SCNC: 19.5 MMOL/L (ref 22–29)
COLOR UR: YELLOW
CREAT BLD-MCNC: 0.9 MG/DL (ref 0.57–1)
DEPRECATED RDW RBC AUTO: 46.4 FL (ref 37–54)
EOSINOPHIL # BLD AUTO: 0.36 10*3/MM3 (ref 0–0.4)
EOSINOPHIL NFR BLD AUTO: 3.6 % (ref 0.3–6.2)
ERYTHROCYTE [DISTWIDTH] IN BLOOD BY AUTOMATED COUNT: 13.6 % (ref 12.3–15.4)
ERYTHROCYTE [SEDIMENTATION RATE] IN BLOOD: 14 MM/HR (ref 0–30)
GFR SERPL CREATININE-BSD FRML MDRD: 60 ML/MIN/1.73
GLUCOSE BLD-MCNC: 114 MG/DL (ref 65–99)
GLUCOSE UR STRIP-MCNC: NEGATIVE MG/DL
HCT VFR BLD AUTO: 43.8 % (ref 34–46.6)
HGB BLD-MCNC: 14.1 G/DL (ref 12–15.9)
HGB UR QL STRIP.AUTO: ABNORMAL
HOLD SPECIMEN: NORMAL
HYALINE CASTS UR QL AUTO: ABNORMAL /LPF
IMM GRANULOCYTES # BLD AUTO: 0.06 10*3/MM3 (ref 0–0.05)
IMM GRANULOCYTES NFR BLD AUTO: 0.6 % (ref 0–0.5)
KETONES UR QL STRIP: NEGATIVE
LEUKOCYTE ESTERASE UR QL STRIP.AUTO: ABNORMAL
LYMPHOCYTES # BLD AUTO: 1.75 10*3/MM3 (ref 0.7–3.1)
LYMPHOCYTES NFR BLD AUTO: 17.6 % (ref 19.6–45.3)
MCH RBC QN AUTO: 29.9 PG (ref 26.6–33)
MCHC RBC AUTO-ENTMCNC: 32.2 G/DL (ref 31.5–35.7)
MCV RBC AUTO: 92.8 FL (ref 79–97)
MONOCYTES # BLD AUTO: 0.88 10*3/MM3 (ref 0.1–0.9)
MONOCYTES NFR BLD AUTO: 8.9 % (ref 5–12)
NEUTROPHILS # BLD AUTO: 6.83 10*3/MM3 (ref 1.7–7)
NEUTROPHILS NFR BLD AUTO: 68.8 % (ref 42.7–76)
NITRITE UR QL STRIP: POSITIVE
NRBC BLD AUTO-RTO: 0 /100 WBC (ref 0–0.2)
PH UR STRIP.AUTO: 5.5 [PH] (ref 5–8)
PLATELET # BLD AUTO: 225 10*3/MM3 (ref 140–450)
PMV BLD AUTO: 12 FL (ref 6–12)
POTASSIUM BLD-SCNC: 4 MMOL/L (ref 3.5–5.2)
PROT UR QL STRIP: ABNORMAL
RBC # BLD AUTO: 4.72 10*6/MM3 (ref 3.77–5.28)
RBC # UR: ABNORMAL /HPF
REF LAB TEST METHOD: ABNORMAL
SODIUM BLD-SCNC: 137 MMOL/L (ref 136–145)
SP GR UR STRIP: 1.02 (ref 1–1.03)
SQUAMOUS #/AREA URNS HPF: ABNORMAL /HPF
UROBILINOGEN UR QL STRIP: ABNORMAL
WBC NRBC COR # BLD: 9.93 10*3/MM3 (ref 3.4–10.8)
WBC UR QL AUTO: ABNORMAL /HPF
WHOLE BLOOD HOLD SPECIMEN: NORMAL

## 2019-05-24 PROCEDURE — 87186 SC STD MICRODIL/AGAR DIL: CPT | Performed by: EMERGENCY MEDICINE

## 2019-05-24 PROCEDURE — 96374 THER/PROPH/DIAG INJ IV PUSH: CPT

## 2019-05-24 PROCEDURE — 81001 URINALYSIS AUTO W/SCOPE: CPT | Performed by: EMERGENCY MEDICINE

## 2019-05-24 PROCEDURE — 85652 RBC SED RATE AUTOMATED: CPT | Performed by: EMERGENCY MEDICINE

## 2019-05-24 PROCEDURE — 25010000002 DIPHENHYDRAMINE PER 50 MG: Performed by: EMERGENCY MEDICINE

## 2019-05-24 PROCEDURE — 99284 EMERGENCY DEPT VISIT MOD MDM: CPT

## 2019-05-24 PROCEDURE — 87086 URINE CULTURE/COLONY COUNT: CPT | Performed by: EMERGENCY MEDICINE

## 2019-05-24 PROCEDURE — 80048 BASIC METABOLIC PNL TOTAL CA: CPT | Performed by: EMERGENCY MEDICINE

## 2019-05-24 PROCEDURE — 85025 COMPLETE CBC W/AUTO DIFF WBC: CPT | Performed by: EMERGENCY MEDICINE

## 2019-05-24 PROCEDURE — 25010000002 METOCLOPRAMIDE PER 10 MG: Performed by: EMERGENCY MEDICINE

## 2019-05-24 PROCEDURE — 96375 TX/PRO/DX INJ NEW DRUG ADDON: CPT

## 2019-05-24 PROCEDURE — 87077 CULTURE AEROBIC IDENTIFY: CPT | Performed by: EMERGENCY MEDICINE

## 2019-05-24 RX ORDER — CEFDINIR 300 MG/1
300 CAPSULE ORAL ONCE
Status: COMPLETED | OUTPATIENT
Start: 2019-05-24 | End: 2019-05-24

## 2019-05-24 RX ORDER — DIPHENHYDRAMINE HYDROCHLORIDE 50 MG/ML
25 INJECTION INTRAMUSCULAR; INTRAVENOUS ONCE
Status: COMPLETED | OUTPATIENT
Start: 2019-05-24 | End: 2019-05-24

## 2019-05-24 RX ORDER — METOCLOPRAMIDE HYDROCHLORIDE 5 MG/ML
10 INJECTION INTRAMUSCULAR; INTRAVENOUS ONCE
Status: COMPLETED | OUTPATIENT
Start: 2019-05-24 | End: 2019-05-24

## 2019-05-24 RX ORDER — CEFDINIR 300 MG/1
300 CAPSULE ORAL 2 TIMES DAILY
Qty: 10 CAPSULE | Refills: 0 | Status: SHIPPED | OUTPATIENT
Start: 2019-05-24 | End: 2019-05-29

## 2019-05-24 RX ORDER — FEXOFENADINE HCL 180 MG/1
180 TABLET ORAL DAILY
COMMUNITY
End: 2021-08-11 | Stop reason: SDUPTHER

## 2019-05-24 RX ORDER — SODIUM CHLORIDE 0.9 % (FLUSH) 0.9 %
10 SYRINGE (ML) INJECTION AS NEEDED
Status: DISCONTINUED | OUTPATIENT
Start: 2019-05-24 | End: 2019-05-24 | Stop reason: HOSPADM

## 2019-05-24 RX ORDER — PROPARACAINE HYDROCHLORIDE 5 MG/ML
1 SOLUTION/ DROPS OPHTHALMIC ONCE
Status: COMPLETED | OUTPATIENT
Start: 2019-05-24 | End: 2019-05-24

## 2019-05-24 RX ADMIN — DIPHENHYDRAMINE HYDROCHLORIDE 25 MG: 50 INJECTION, SOLUTION INTRAMUSCULAR; INTRAVENOUS at 18:24

## 2019-05-24 RX ADMIN — CEFDINIR 300 MG: 300 CAPSULE ORAL at 20:41

## 2019-05-24 RX ADMIN — PROPARACAINE HYDROCHLORIDE 1 DROP: 5 SOLUTION/ DROPS OPHTHALMIC at 20:00

## 2019-05-24 RX ADMIN — SODIUM CHLORIDE 500 ML: 9 INJECTION, SOLUTION INTRAVENOUS at 18:21

## 2019-05-24 RX ADMIN — METOCLOPRAMIDE 10 MG: 5 INJECTION, SOLUTION INTRAMUSCULAR; INTRAVENOUS at 18:21

## 2019-05-24 NOTE — TELEPHONE ENCOUNTER
----- Message from Luz Rivera sent at 5/24/2019  1:39 PM EDT -----  Contact: Patient  Patient called with complaint of migraine headache x2 weeks, with light and noise sensitivity.  Also complains of metallic taste in mouth with nausea.  Asking if something can be sent to pharmacy.  Please advise.    Patient:  [phone]    [facility] [address] [place] [address] AT [facility] [phone] [facility] [phone]

## 2019-05-24 NOTE — TELEPHONE ENCOUNTER
I informed Ms. Watts per Dr. Mascorro that she need to go to the ER for further evaluation due to her having a migraine ongoing x 2 weeks.     Ms. Watts first informed me that she lives in assisted living and would not have anyone to take her until tomorrow. I again encouraged her per Dr. Mascorro it's best that she go today rather than tomorrow.     She stated she would call her daughter-in-law Mercedes Watts and see if she can take her.

## 2019-05-26 LAB — BACTERIA SPEC AEROBE CULT: ABNORMAL

## 2019-07-03 ENCOUNTER — TELEPHONE (OUTPATIENT)
Dept: INTERNAL MEDICINE | Facility: CLINIC | Age: 79
End: 2019-07-03

## 2019-07-03 NOTE — TELEPHONE ENCOUNTER
----- Message from Nichole Bell sent at 7/3/2019 12:49 PM EDT -----  Contact: Rea with [employer]  Calling to get PT and OT orders.      Caller# [phone]  Need last 4 of social.

## 2019-07-17 ENCOUNTER — TELEPHONE (OUTPATIENT)
Dept: INTERNAL MEDICINE | Facility: CLINIC | Age: 79
End: 2019-07-17

## 2019-07-17 DIAGNOSIS — M62.838 MUSCLE SPASM: Primary | ICD-10-CM

## 2019-07-17 DIAGNOSIS — M62.838 MUSCLE SPASM: ICD-10-CM

## 2019-07-17 RX ORDER — BACLOFEN 10 MG/1
10 TABLET ORAL 2 TIMES DAILY PRN
Qty: 30 TABLET | Refills: 0 | Status: SHIPPED | OUTPATIENT
Start: 2019-07-17 | End: 2019-07-17 | Stop reason: SDUPTHER

## 2019-07-17 NOTE — TELEPHONE ENCOUNTER
----- Message from Elmira Almonte sent at 7/17/2019  3:44 PM EDT -----  Contact: pt -Dr Mascorro's pt - RE: Rx refill  Pt calling and would like a refill on Rx. She informs was denied due to needs appt. She is scheduled for 08/06/2019. Could you damon fill Rx til appt? Please advise. Thanks      baclofen (LIORESAL) 10 MG tablet 180 tablet 1 10/8/2018    Sig: TAKE 1 TABLET BY MOUTH TWICE DAILY AS NEEDED FOR MUSCLE SPASMS     [facility] [address] [place] [address] AT [facility] - [phone] [facility] [phone] FX      Pt # [phone]

## 2019-07-18 RX ORDER — BACLOFEN 10 MG/1
TABLET ORAL
Qty: 180 TABLET | Refills: 1 | Status: SHIPPED | OUTPATIENT
Start: 2019-07-18 | End: 2019-12-05 | Stop reason: SDUPTHER

## 2019-07-22 ENCOUNTER — TELEPHONE (OUTPATIENT)
Dept: INTERNAL MEDICINE | Facility: CLINIC | Age: 79
End: 2019-07-22

## 2019-07-22 NOTE — TELEPHONE ENCOUNTER
----- Message from Elmira Almonte sent at 7/22/2019 12:40 PM EDT -----  Contact: Semaj - Dr Mascorro's pt - RE: verbal orders  Semaj [facility] calling and would like a return call w/ verbal orders for PT. Could you please call w/ verbal orders? Please advise. Thanks      Semaj Lucia  # [phone]

## 2019-08-06 ENCOUNTER — TELEPHONE (OUTPATIENT)
Dept: INTERNAL MEDICINE | Facility: CLINIC | Age: 79
End: 2019-08-06

## 2019-08-06 DIAGNOSIS — E03.9 ACQUIRED HYPOTHYROIDISM: ICD-10-CM

## 2019-08-06 RX ORDER — LEVOTHYROXINE SODIUM 0.1 MG/1
100 TABLET ORAL DAILY
Qty: 90 TABLET | Refills: 1 | Status: SHIPPED | OUTPATIENT
Start: 2019-08-06 | End: 2019-12-05 | Stop reason: SDUPTHER

## 2019-08-06 RX ORDER — LEVOTHYROXINE SODIUM 88 UG/1
TABLET ORAL
Qty: 45 TABLET | Refills: 1 | Status: SHIPPED | OUTPATIENT
Start: 2019-08-06 | End: 2019-12-05 | Stop reason: SDUPTHER

## 2019-08-06 NOTE — TELEPHONE ENCOUNTER
----- Message from Nichole Bell sent at 8/6/2019  8:28 AM EDT -----  Contact: pt  Pt FYEVAN- Ptcalled and canceled today's AWV as she has a headache.   I explained the type of appointment and that this was an important one to keep, for scheduling, insurance and medication reasons.  She Understood and did reschedule to December.    Pt# [phone]

## 2019-08-07 DIAGNOSIS — E03.9 ACQUIRED HYPOTHYROIDISM: ICD-10-CM

## 2019-08-08 RX ORDER — LEVOTHYROXINE SODIUM 0.1 MG/1
100 TABLET ORAL DAILY
Qty: 90 TABLET | Refills: 0 | OUTPATIENT
Start: 2019-08-08

## 2019-08-08 RX ORDER — LEVOTHYROXINE SODIUM 88 UG/1
TABLET ORAL
Qty: 45 TABLET | Refills: 0 | OUTPATIENT
Start: 2019-08-08

## 2019-10-07 RX ORDER — VERAPAMIL HYDROCHLORIDE 240 MG/1
TABLET, FILM COATED, EXTENDED RELEASE ORAL
Qty: 90 TABLET | Refills: 1 | Status: SHIPPED | OUTPATIENT
Start: 2019-10-07 | End: 2019-12-05 | Stop reason: SDUPTHER

## 2019-12-05 ENCOUNTER — APPOINTMENT (OUTPATIENT)
Dept: WOMENS IMAGING | Facility: HOSPITAL | Age: 79
End: 2019-12-05

## 2019-12-05 ENCOUNTER — OFFICE VISIT (OUTPATIENT)
Dept: INTERNAL MEDICINE | Facility: CLINIC | Age: 79
End: 2019-12-05

## 2019-12-05 VITALS
DIASTOLIC BLOOD PRESSURE: 90 MMHG | TEMPERATURE: 101 F | HEIGHT: 61 IN | BODY MASS INDEX: 38.44 KG/M2 | WEIGHT: 203.6 LBS | OXYGEN SATURATION: 96 % | SYSTOLIC BLOOD PRESSURE: 140 MMHG | HEART RATE: 93 BPM | RESPIRATION RATE: 18 BRPM

## 2019-12-05 DIAGNOSIS — F41.9 ANXIETY: ICD-10-CM

## 2019-12-05 DIAGNOSIS — M54.50 CHRONIC RIGHT-SIDED LOW BACK PAIN WITHOUT SCIATICA: ICD-10-CM

## 2019-12-05 DIAGNOSIS — R50.9 FEVER, UNSPECIFIED FEVER CAUSE: ICD-10-CM

## 2019-12-05 DIAGNOSIS — E03.9 ACQUIRED HYPOTHYROIDISM: ICD-10-CM

## 2019-12-05 DIAGNOSIS — G89.29 CHRONIC RIGHT-SIDED LOW BACK PAIN WITHOUT SCIATICA: ICD-10-CM

## 2019-12-05 DIAGNOSIS — M62.838 MUSCLE SPASM: ICD-10-CM

## 2019-12-05 DIAGNOSIS — M81.0 OSTEOPOROSIS, UNSPECIFIED OSTEOPOROSIS TYPE, UNSPECIFIED PATHOLOGICAL FRACTURE PRESENCE: ICD-10-CM

## 2019-12-05 DIAGNOSIS — I10 ESSENTIAL HYPERTENSION: Primary | ICD-10-CM

## 2019-12-05 DIAGNOSIS — R21 RASH: ICD-10-CM

## 2019-12-05 DIAGNOSIS — R05.9 COUGH: ICD-10-CM

## 2019-12-05 PROCEDURE — 71046 X-RAY EXAM CHEST 2 VIEWS: CPT | Performed by: RADIOLOGY

## 2019-12-05 PROCEDURE — G0439 PPPS, SUBSEQ VISIT: HCPCS | Performed by: INTERNAL MEDICINE

## 2019-12-05 PROCEDURE — 99214 OFFICE O/P EST MOD 30 MIN: CPT | Performed by: INTERNAL MEDICINE

## 2019-12-05 PROCEDURE — 96160 PT-FOCUSED HLTH RISK ASSMT: CPT | Performed by: INTERNAL MEDICINE

## 2019-12-05 PROCEDURE — 71046 X-RAY EXAM CHEST 2 VIEWS: CPT | Performed by: INTERNAL MEDICINE

## 2019-12-05 RX ORDER — VERAPAMIL HYDROCHLORIDE 240 MG/1
240 TABLET, FILM COATED, EXTENDED RELEASE ORAL DAILY
Qty: 90 TABLET | Refills: 3 | Status: SHIPPED | OUTPATIENT
Start: 2019-12-05 | End: 2021-08-11 | Stop reason: SDUPTHER

## 2019-12-05 RX ORDER — LEVOTHYROXINE SODIUM 0.1 MG/1
100 TABLET ORAL EVERY OTHER DAY
Qty: 45 TABLET | Refills: 3 | Status: SHIPPED | OUTPATIENT
Start: 2019-12-05 | End: 2020-12-07

## 2019-12-05 RX ORDER — GABAPENTIN 800 MG/1
800 TABLET ORAL DAILY
Qty: 90 TABLET | Refills: 1 | OUTPATIENT
Start: 2019-12-05 | End: 2020-03-25 | Stop reason: SDUPTHER

## 2019-12-05 RX ORDER — LEVOTHYROXINE SODIUM 88 UG/1
88 TABLET ORAL EVERY OTHER DAY
Qty: 45 TABLET | Refills: 3 | Status: SHIPPED | OUTPATIENT
Start: 2019-12-05 | End: 2020-12-07

## 2019-12-05 RX ORDER — RALOXIFENE HYDROCHLORIDE 60 MG/1
60 TABLET, FILM COATED ORAL DAILY
Qty: 90 TABLET | Refills: 3 | Status: SHIPPED | OUTPATIENT
Start: 2019-12-05 | End: 2020-12-07

## 2019-12-05 RX ORDER — ESCITALOPRAM OXALATE 10 MG/1
TABLET ORAL
Qty: 135 TABLET | Refills: 3 | Status: SHIPPED | OUTPATIENT
Start: 2019-12-05 | End: 2020-12-07

## 2019-12-05 RX ORDER — BACLOFEN 10 MG/1
10 TABLET ORAL 3 TIMES DAILY
Qty: 180 TABLET | Refills: 1 | Status: SHIPPED | OUTPATIENT
Start: 2019-12-05 | End: 2021-05-18

## 2019-12-05 RX ORDER — VALSARTAN AND HYDROCHLOROTHIAZIDE 160; 25 MG/1; MG/1
1 TABLET ORAL DAILY
Qty: 90 TABLET | Refills: 3 | Status: SHIPPED | OUTPATIENT
Start: 2019-12-05 | End: 2020-12-07

## 2019-12-05 NOTE — PROGRESS NOTES
The ABCs of the Annual Wellness Visit  Subsequent Medicare Wellness Visit    Chief Complaint   Patient presents with   • Medicare Wellness-subsequent   • Hyperlipidemia   • Hypertension   • Hypothyroidism       Subjective   History of Present Illness:  Dee Watts is a 79 y.o. female who presents for a Subsequent Medicare Wellness Visit.    HEALTH RISK ASSESSMENT    Recent Hospitalizations:  No hospitalization(s) within the last year.    Current Medical Providers:  Patient Care Team:  Kira Mascorro MD as PCP - General  Pedro Sy MD as Surgeon (Orthopedic Surgery)  Keith Dorsey MD as Surgeon (Orthopedic Surgery)  Kira Beal MD (Dermatology)  Jadon Jones MD as Consulting Physician (Ophthalmology)    Smoking Status:  Social History     Tobacco Use   Smoking Status Former Smoker   • Last attempt to quit: 1990   • Years since quittin.9   Smokeless Tobacco Never Used       Alcohol Consumption:  Social History     Substance and Sexual Activity   Alcohol Use Yes    Comment: 1 DRINK PER WEEK       Depression Screen:   PHQ-2/PHQ-9 Depression Screening 2019   Little interest or pleasure in doing things 0   Feeling down, depressed, or hopeless 0   Trouble falling or staying asleep, or sleeping too much 0   Feeling tired or having little energy 0   Poor appetite or overeating 0   Feeling bad about yourself - or that you are a failure or have let yourself or your family down 0   Trouble concentrating on things, such as reading the newspaper or watching television 0   Moving or speaking so slowly that other people could have noticed. Or the opposite - being so fidgety or restless that you have been moving around a lot more than usual 0   Thoughts that you would be better off dead, or of hurting yourself in some way 0   Total Score 0       Fall Risk Screen:  STEADI Fall Risk Assessment has not been completed.    Health Habits and Functional and Cognitive Screening:  Functional &  Cognitive Status 12/5/2019   Do you have difficulty preparing food and eating? No   Do you have difficulty bathing yourself, getting dressed or grooming yourself? No   Do you have difficulty using the toilet? No   Do you have difficulty moving around from place to place? Yes   Do you have trouble with steps or getting out of a bed or a chair? Yes   Current Diet Well Balanced Diet   Dental Exam Up to date   Eye Exam Up to date   Exercise (times per week) 0 times per week   Current Exercise Activities Include None   Do you need help using the phone?  No   Are you deaf or do you have serious difficulty hearing?  No   Do you need help with transportation? No   Do you need help shopping? No   Do you need help preparing meals?  Yes   Do you need help with housework?  Yes   Do you need help with laundry? Yes   Do you need help taking your medications? No   Do you need help managing money? No   Do you ever drive or ride in a car without wearing a seat belt? No   Have you felt unusual stress, anger or loneliness in the last month? No   Who do you live with? Community   If you need help, do you have trouble finding someone available to you? No   Have you been bothered in the last four weeks by sexual problems? No   Do you have difficulty concentrating, remembering or making decisions? Yes         Does the patient have evidence of cognitive impairment? No    Asprin use counseling:Does not need ASA (and currently is not on it)    Age-appropriate Screening Schedule:  Refer to the list below for future screening recommendations based on patient's age, sex and/or medical conditions. Orders for these recommended tests are listed in the plan section. The patient has been provided with a written plan.    Health Maintenance   Topic Date Due   • PNEUMOCOCCAL VACCINES (65+ LOW/MEDIUM RISK) (1 of 2 - PCV13) 06/02/2003   • DXA SCAN  04/11/2017   • INFLUENZA VACCINE  08/01/2019   • LIPID PANEL  08/02/2019   • ZOSTER VACCINE (2 of 3)  12/05/2020 (Originally 8/22/2013)   • TDAP/TD VACCINES (2 - Td) 03/27/2022          The following portions of the patient's history were reviewed and updated as appropriate: allergies, current medications, past family history, past medical history, past social history, past surgical history and problem list.    Outpatient Medications Prior to Visit   Medication Sig Dispense Refill   • fexofenadine (ALLEGRA) 180 MG tablet Take 180 mg by mouth Daily.     • baclofen (LIORESAL) 10 MG tablet TAKE 1 TABLET BY MOUTH TWICE DAILY AS NEEDED MUSCLE SPASMS 180 tablet 1   • escitalopram (LEXAPRO) 10 MG tablet 1.5 pill (Patient taking differently: Take 5 mg by mouth Daily. 1.5 pill) 135 tablet 3   • gabapentin (NEURONTIN) 800 MG tablet TAKE 1 TABLET BY MOUTH EVERY DAY 90 tablet 0   • levothyroxine (SYNTHROID, LEVOTHROID) 100 MCG tablet TAKE 1 TABLET BY MOUTH DAILY (Patient taking differently: Take 100 mcg by mouth Every Other Day.) 90 tablet 1   • levothyroxine (SYNTHROID, LEVOTHROID) 88 MCG tablet TAKE 1 TABLET BY MOUTH EVERY OTHER DAY 45 tablet 1   • nystatin-triamcinolone (MYCOLOG II) 198047-0.1 UNIT/GM-% cream APPLY EXTERNALLY TO THE AFFECTED AREA TWICE DAILY 60 g 1   • raloxifene (EVISTA) 60 MG tablet Take 1 tablet by mouth Daily. 90 tablet 3   • valsartan-hydrochlorothiazide (DIOVAN-HCT) 160-25 MG per tablet Take 1 tablet by mouth Daily. 90 tablet 3   • verapamil SR (CALAN-SR) 240 MG CR tablet Take 1 tablet by mouth Daily. 90 tablet 3   • verapamil SR (CALAN-SR) 240 MG CR tablet TAKE 1 TABLET BY MOUTH DAILY 90 tablet 1     No facility-administered medications prior to visit.        Patient Active Problem List   Diagnosis   • DDD (degenerative disc disease), lumbar   • Osteoarthritis of hip   • Adult body mass index greater than 30   • Lumbosacral neuritis   • Lumbar canal stenosis   • Chronic right-sided low back pain without sciatica   • Hypothyroidism   • Hypertension   • Mood disorder (CMS/HCC)   • Vitamin D deficiency  "  • IBS (irritable bowel syndrome)   • Osteoporosis   • Migraines   • GERD (gastroesophageal reflux disease)   • Insomnia   • Chronic breast pain   • Status post total hip replacement, right   • Right hip pain   • Chronic fatigue       Advanced Care Planning:  Patient has an advance directive - a copy has not been provided. Have asked the patient to send this to us to add to record    Review of Systems    Compared to one year ago, the patient feels her physical health is the same.  Compared to one year ago, the patient feels her mental health is the same.    Reviewed chart for potential of high risk medication in the elderly: not applicable  Reviewed chart for potential of harmful drug interactions in the elderly:not applicable    Objective         Vitals:    12/05/19 1535   BP: 140/90   BP Location: Right arm   Patient Position: Sitting   Cuff Size: Adult   Pulse: 93   Resp: 18   Temp: (!) 101 °F (38.3 °C)   TempSrc: Oral   SpO2: 96%   Weight: 92.4 kg (203 lb 9.6 oz)   Height: 153.7 cm (60.5\")  Comment: Updated Height   PainSc: 0-No pain       Body mass index is 39.11 kg/m².  Discussed the patient's BMI with her. The BMI is above average; BMI management plan is completed.    Physical Exam          Assessment/Plan   Medicare Risks and Personalized Health Plan  CMS Preventative Services Quick Reference  Advance Directive Discussion  Fall Risk  Immunizations Discussed/Encouraged (specific immunizations; Influenza, Pneumococcal 23, Prevnar and Shingrix )  Obesity/Overweight   Urinary Incontinence    The above risks/problems have been discussed with the patient.  Pertinent information has been shared with the patient in the After Visit Summary.  Follow up plans and orders are seen below in the Assessment/Plan Section.    Diagnoses and all orders for this visit:    1. Essential hypertension  Comments:  high today (but no med today)- need low salt & wt loss - need daily bp chk - will need additional meds if over " 140/90  Orders:  -     valsartan-hydrochlorothiazide (DIOVAN-HCT) 160-25 MG per tablet; Take 1 tablet by mouth Daily.  Dispense: 90 tablet; Refill: 3    2. Osteoporosis, unspecified osteoporosis type, unspecified pathological fracture presence  Comments:  need bone d  Orders:  -     raloxifene (EVISTA) 60 MG tablet; Take 1 tablet by mouth Daily.  Dispense: 90 tablet; Refill: 3    3. Acquired hypothyroidism  Comments:  continue synthroid 88 alt with 100mcg qod - she did not incr dose b/c had missed pills last bld test  Orders:  -     levothyroxine (SYNTHROID, LEVOTHROID) 88 MCG tablet; Take 1 tablet by mouth Every Other Day.  Dispense: 45 tablet; Refill: 3  -     levothyroxine (SYNTHROID, LEVOTHROID) 100 MCG tablet; Take 1 tablet by mouth Every Other Day.  Dispense: 45 tablet; Refill: 3    4. Rash  -     nystatin-triamcinolone (MYCOLOG II) 130357-7.1 UNIT/GM-% cream; Apply  topically to the appropriate area as directed 2 (Two) Times a Day. to affected area  Dispense: 60 g; Refill: 3    5. Chronic right-sided low back pain without sciatica  Comments:  need to see dr mijares again  Orders:  -     gabapentin (NEURONTIN) 800 MG tablet; Take 1 tablet by mouth Daily.  Dispense: 90 tablet; Refill: 1    6. Mood disorder (CMS/HCC)  Comments:  stable - call if sx  Orders:  -     escitalopram (LEXAPRO) 10 MG tablet; 1.5 pill  Dispense: 135 tablet; Refill: 3    7. Muscle spasm  -     baclofen (LIORESAL) 10 MG tablet; Take 1 tablet by mouth 3 (Three) Times a Day. Prn muscle spasm  Dispense: 180 tablet; Refill: 1    Other orders  -     verapamil SR (CALAN-SR) 240 MG CR tablet; Take 1 tablet by mouth Daily.  Dispense: 90 tablet; Refill: 3      Follow Up:  Return in about 6 months (around 6/5/2020) for Recheck.     An After Visit Summary and PPPS were given to the patient.

## 2019-12-05 NOTE — PROGRESS NOTES
"Subjective   Dee Mac is a 79 y.o. female here for   Chief Complaint   Patient presents with   • Medicare Wellness-subsequent   • Hyperlipidemia   • Hypertension   • Hypothyroidism   .    Vitals:    12/05/19 1535   BP: 140/90   BP Location: Right arm   Patient Position: Sitting   Cuff Size: Adult   Pulse: 93   Resp: 18   Temp: (!) 101 °F (38.3 °C)   TempSrc: Oral   SpO2: 96%   Weight: 92.4 kg (203 lb 9.6 oz)   Height: 153.7 cm (60.5\")       Body mass index is 39.11 kg/m².    Hyperlipidemia   This is a chronic problem. The current episode started more than 1 year ago. The problem is controlled. Recent lipid tests were reviewed and are normal. Exacerbating diseases include hypothyroidism. She has no history of diabetes. Pertinent negatives include no chest pain or shortness of breath.   Hypertension   This is a chronic problem. The current episode started more than 1 year ago. The problem is unchanged. The problem is controlled. Pertinent negatives include no chest pain, palpitations or shortness of breath.   Hypothyroidism   This is a chronic problem. The current episode started more than 1 year ago. The problem has been unchanged. Associated symptoms include coughing (no worse), fatigue and a fever. Pertinent negatives include no chest pain or chills.   Fever    This is a new problem. The current episode started today. The maximum temperature noted was 101 to 101.9 F. Associated symptoms include coughing (no worse) and wheezing (occ with talking). Pertinent negatives include no chest pain.   Fatigue   This is a chronic problem. The current episode started more than 1 year ago. The problem occurs constantly. The problem has been unchanged. Associated symptoms include coughing (no worse), fatigue and a fever. Pertinent negatives include no chest pain or chills.        The following portions of the patient's history were reviewed and updated as appropriate: allergies, current medications, past social history " and problem list.    Review of Systems   Constitutional: Positive for fatigue and fever. Negative for chills.   Respiratory: Positive for cough (no worse) and wheezing (occ with talking). Negative for shortness of breath.    Cardiovascular: Negative for chest pain, palpitations and leg swelling.   Psychiatric/Behavioral: Positive for dysphoric mood and sleep disturbance. The patient is nervous/anxious.      Gait is wide based & slow (uses cane).    Objective   Physical Exam   Constitutional: She appears well-developed and well-nourished. No distress.   Cardiovascular: Normal rate, regular rhythm and normal heart sounds.   Pulmonary/Chest: No respiratory distress. She has no wheezes. She has no rales. She exhibits no tenderness.   Musculoskeletal: She exhibits no edema.   Psychiatric: She has a normal mood and affect. Her behavior is normal.   Nursing note and vitals reviewed.      Assessment/Plan   Diagnoses and all orders for this visit:    Essential hypertension  Comments:  borderline- need low salt & wt loss - need daily bp chk - call if over 140/90  Orders:  -     verapamil SR (CALAN-SR) 240 MG CR tablet; Take 1 tablet by mouth Daily.  -     valsartan-hydrochlorothiazide (DIOVAN-HCT) 160-25 MG per tablet; Take 1 tablet by mouth Daily.  -     CBC Auto Differential; Future  -     Comprehensive Metabolic Panel; Future  -     Lipid Panel; Future    Osteoporosis, unspecified osteoporosis type, unspecified pathological fracture presence  Comments:  need bone d  Orders:  -     raloxifene (EVISTA) 60 MG tablet; Take 1 tablet by mouth Daily.    Acquired hypothyroidism  Comments:  continue synthroid 88 alt with 100mcg qod - need tsh in several mos  Orders:  -     levothyroxine (SYNTHROID, LEVOTHROID) 88 MCG tablet; Take 1 tablet by mouth Every Other Day.  -     levothyroxine (SYNTHROID, LEVOTHROID) 100 MCG tablet; Take 1 tablet by mouth Every Other Day.  -     TSH Rfx On Abnormal To Free T4;  Future    Rash  Comments:  frequent tinea -call if worse  Orders:  -     nystatin-triamcinolone (MYCOLOG II) 641413-1.1 UNIT/GM-% cream; Apply  topically to the appropriate area as directed 2 (Two) Times a Day. to affected area    Chronic right-sided low back pain without sciatica  Comments:  will need to see dr mijares again if worse  Orders:  -     gabapentin (NEURONTIN) 800 MG tablet; Take 1 tablet by mouth Daily.    Anxiety  Comments:  stable - call if sx  Orders:  -     escitalopram (LEXAPRO) 10 MG tablet; 1.5 pill    Muscle spasm  Comments:  off & on - call if worse  Orders:  -     baclofen (LIORESAL) 10 MG tablet; Take 1 tablet by mouth 3 (Three) Times a Day. Prn muscle spasm    Cough  Comments:  occ cough & wheeze (no change) - call if worse - she declines any treatment  Orders:  -     XR Chest 2 View    Fever, unspecified fever cause  Comments:  found on exam today - call if any sx of infection  Orders:  -     XR Chest 2 View       She needs flu & pneu vaccine when no fever. She declines shingrix.      Return for fasting labs & bone density.      Wellness today.   Need daily strengthening & balance exercises (shown today).   Need screening for AAA & carotid disease.  Information given today.      CXR benign today - no change since 2013. Sent for over-read.

## 2019-12-05 NOTE — PATIENT INSTRUCTIONS
Medicare Wellness  Personal Prevention Plan of Service     Date of Office Visit:  2019  Encounter Provider:  Kira Mascorro MD  Place of Service:  [facility]  Patient Name: Dee Watts  :  1940    As part of the Medicare Wellness portion of your visit today, we are providing you with this personalized preventive plan of services (PPPS). This plan is based upon recommendations of the [place] Preventive Services Task Force (USPSTF) and the Advisory Committee on Immunization Practices (ACIP).    This lists the preventive care services that should be considered, and provides dates of when you are due. Items listed as completed are up-to-date and do not require any further intervention.    Health Maintenance   Topic Date Due   • PNEUMOCOCCAL VACCINES (65+ LOW/MEDIUM RISK) (1 of 2 - PCV13) 2003   • DXA SCAN  2017   • INFLUENZA VACCINE  2019   • LIPID PANEL  2019   • ZOSTER VACCINE (2 of 3) 2020 (Originally 2013)   • MEDICARE ANNUAL WELLNESS  2020   • TDAP/TD VACCINES (2 - Td) 2022       No orders of the defined types were placed in this encounter.      No Follow-up on file.

## 2020-03-25 DIAGNOSIS — G89.29 CHRONIC RIGHT-SIDED LOW BACK PAIN WITHOUT SCIATICA: ICD-10-CM

## 2020-03-25 DIAGNOSIS — M54.50 CHRONIC RIGHT-SIDED LOW BACK PAIN WITHOUT SCIATICA: ICD-10-CM

## 2020-03-25 RX ORDER — GABAPENTIN 800 MG/1
800 TABLET ORAL DAILY
Qty: 90 TABLET | Refills: 1 | OUTPATIENT
Start: 2020-03-25 | End: 2020-12-07

## 2020-03-25 NOTE — TELEPHONE ENCOUNTER
Last office visit: 12/05/2019   Last fill date: 12/05/2019  Next office visit: 06/05/2020  Roel womack

## 2020-04-21 ENCOUNTER — OFFICE VISIT (OUTPATIENT)
Dept: INTERNAL MEDICINE | Facility: CLINIC | Age: 80
End: 2020-04-21

## 2020-04-21 ENCOUNTER — TELEPHONE (OUTPATIENT)
Dept: INTERNAL MEDICINE | Facility: CLINIC | Age: 80
End: 2020-04-21

## 2020-04-21 DIAGNOSIS — R53.82 CHRONIC FATIGUE: ICD-10-CM

## 2020-04-21 DIAGNOSIS — N39.46 MIXED STRESS AND URGE URINARY INCONTINENCE: ICD-10-CM

## 2020-04-21 DIAGNOSIS — F51.01 PRIMARY INSOMNIA: ICD-10-CM

## 2020-04-21 DIAGNOSIS — M54.2 NECK PAIN: Primary | ICD-10-CM

## 2020-04-21 DIAGNOSIS — E03.9 ACQUIRED HYPOTHYROIDISM: ICD-10-CM

## 2020-04-21 DIAGNOSIS — I10 ESSENTIAL HYPERTENSION: ICD-10-CM

## 2020-04-21 PROCEDURE — 99443 PR PHYS/QHP TELEPHONE EVALUATION 21-30 MIN: CPT | Performed by: INTERNAL MEDICINE

## 2020-04-21 RX ORDER — METHYLPREDNISOLONE 4 MG/1
TABLET ORAL
Qty: 21 TABLET | Refills: 0 | OUTPATIENT
Start: 2020-04-21 | End: 2020-10-30

## 2020-04-21 NOTE — TELEPHONE ENCOUNTER
Patient is requesting a call back regarding a high fever that she had yesterday. She states that he neck is also hurting and swelling. Patient can be reached at [phone] (h)

## 2020-04-21 NOTE — PROGRESS NOTES
Subjective   Dee Mac is a 79 y.o. female here for   Chief Complaint   Patient presents with   • Neck Pain   • Hypertension   • Urinary Incontinence   .    There were no vitals filed for this visit.    There is no height or weight on file to calculate BMI.    Neck Pain    This is a new problem. The current episode started in the past 7 days. The problem occurs constantly. The problem has been unchanged. The pain is associated with nothing. The pain is present in the left side and right side. The quality of the pain is described as aching. The pain is moderate. Associated symptoms include a fever (felt hot yesterday). Pertinent negatives include no chest pain, numbness or weakness. She has tried muscle relaxants for the symptoms.   Hypertension   This is a chronic problem. The current episode started more than 1 year ago. The problem is unchanged. The problem is controlled. Associated symptoms include neck pain. Pertinent negatives include no chest pain, palpitations or shortness of breath.        The following portions of the patient's history were reviewed and updated as appropriate: allergies, current medications, past social history and problem list.    Review of Systems   Constitutional: Positive for fatigue and fever (felt hot yesterday). Negative for chills.   Respiratory: Negative for cough, shortness of breath and wheezing.    Cardiovascular: Negative for chest pain, palpitations and leg swelling.   Genitourinary: Positive for enuresis and urgency. Negative for dysuria and hematuria.   Musculoskeletal: Positive for arthralgias (neck pain both sides- down right arm) and neck pain.   Allergic/Immunologic: Positive for environmental allergies.   Neurological: Negative for weakness and numbness.   Psychiatric/Behavioral: Positive for sleep disturbance. Negative for dysphoric mood. The patient is not nervous/anxious.        Objective   Physical Exam    Assessment/Plan   Diagnoses and all orders for this  visit:    Neck pain  Comments:  worse for 1 wk - trial of medrol dose alejandro - call if no better or if any weakness, numbness,etc.  Orders:  -     methylPREDNISolone (Medrol) 4 MG tablet; follow package directions    Essential hypertension  Comments:  need bp chk weeklly & call in 2 wks with bp readings    Chronic fatigue    Mixed stress and urge urinary incontinence  Comments:  will change bp med to no diuretic if bp always normal    Acquired hypothyroidism  Comments:  overdue for labs - need to come in fasting    Primary insomnia  Comments:  need to get on routine sleep schedule - don't take gabapentin too early          You have chosen to receive care through a telephone visit. Do you consent to use a telephone visit for your medical care today? Yes      This visit has been rescheduled as a phone visit to comply with patient safety concerns in accordance with CDC recommendations. Total time of discussion was 30 minutes.

## 2020-04-24 ENCOUNTER — TELEPHONE (OUTPATIENT)
Dept: INTERNAL MEDICINE | Facility: CLINIC | Age: 80
End: 2020-04-24

## 2020-04-24 NOTE — TELEPHONE ENCOUNTER
----- Message from Perri Olea LPN sent at 4/22/2020  9:35 AM EDT -----  Regarding: RE: pls find out what time her appt is on 6/5 & let her know  No answer or voicemail.   ----- Message -----  From: Kira Mascorro MD  Sent: 4/21/2020   2:29 PM EDT  To: Perri Olea LPN  Subject: pls find out what time her appt is on 6/5 & #          Patient informed appt is 6/5/20 at 10:00

## 2020-06-05 ENCOUNTER — OFFICE VISIT (OUTPATIENT)
Dept: INTERNAL MEDICINE | Facility: CLINIC | Age: 80
End: 2020-06-05

## 2020-06-05 ENCOUNTER — TELEPHONE (OUTPATIENT)
Dept: INTERNAL MEDICINE | Facility: CLINIC | Age: 80
End: 2020-06-05

## 2020-06-05 DIAGNOSIS — J34.89 FRONTAL SINUS PAIN: Primary | ICD-10-CM

## 2020-06-05 DIAGNOSIS — I10 ESSENTIAL HYPERTENSION: ICD-10-CM

## 2020-06-05 DIAGNOSIS — R53.82 CHRONIC FATIGUE: ICD-10-CM

## 2020-06-05 DIAGNOSIS — F39 MOOD DISORDER (HCC): ICD-10-CM

## 2020-06-05 DIAGNOSIS — J30.9 ALLERGIC RHINITIS, UNSPECIFIED SEASONALITY, UNSPECIFIED TRIGGER: ICD-10-CM

## 2020-06-05 PROCEDURE — 99443 PR PHYS/QHP TELEPHONE EVALUATION 21-30 MIN: CPT | Performed by: INTERNAL MEDICINE

## 2020-06-05 NOTE — TELEPHONE ENCOUNTER
----- Message from Kira Mascorro MD sent at 6/4/2020  6:06 PM EDT -----  Regarding: pls call - does she want phone visit instead?  She was to have video visit Thursday but never connected

## 2020-06-05 NOTE — TELEPHONE ENCOUNTER
Attempted to call patient/unable to leave message.  Will call again to try and schedule telephone visit.  Patient was not able on Thursday, 06/04/2020, to get connected for her video visit.

## 2020-06-05 NOTE — PROGRESS NOTES
Subjective   Dee Mac is a 80 y.o. female here for   Chief Complaint   Patient presents with   • Sinus Problem     pressure   • Nasal Congestion   • Hypertension   .    There were no vitals filed for this visit.    There is no height or weight on file to calculate BMI.    Sinus Problem   This is a recurrent problem. The current episode started more than 1 year ago. The problem has been gradually worsening since onset. There has been no fever. The pain is mild. Associated symptoms include congestion, coughing, ear pain, sinus pressure, sneezing (mild) and a sore throat. Pertinent negatives include no chills or shortness of breath.   Hypertension   This is a chronic problem. The current episode started more than 1 year ago. The problem is unchanged. Pertinent negatives include no chest pain, palpitations or shortness of breath.        The following portions of the patient's history were reviewed and updated as appropriate: allergies, current medications, past social history and problem list.    Review of Systems   Constitutional: Positive for fatigue. Negative for chills and fever.   HENT: Positive for congestion, ear pain, postnasal drip, rhinorrhea, sinus pressure, sneezing (mild), sore throat and voice change. Negative for sinus pain.    Respiratory: Positive for cough. Negative for shortness of breath and wheezing.    Cardiovascular: Negative for chest pain, palpitations and leg swelling.   Allergic/Immunologic: Positive for environmental allergies.   Psychiatric/Behavioral: Negative for dysphoric mood (controlled ). The patient is not nervous/anxious.        Objective   Physical Exam    Assessment/Plan   Diagnoses and all orders for this visit:    Frontal sinus pain  Comments:  no fever,etc - likely just congestion -need mucinex 1200mg bid & daily antihis -need nasal saline tid -call if no better or worse  Orders:  -     Ambulatory Referral to ENT (Otolaryngology)    Allergic rhinitis, unspecified  seasonality, unspecified trigger  Comments:  seen by allergist in the past -need maximal meds - refer to ENT    Chronic fatigue  Comments:  call if worse - need labs    Essential hypertension  Comments:  need daily chk & call if bp over 140/90    Mood disorder (CMS/HCC)  Comments:  depression fairly well controlled - call if worse          You have chosen to receive care through a telephone visit. Do you consent to use a telephone visit for your medical care today? Yes     This visit has been rescheduled as a phone visit to comply with patient safety concerns in accordance with CDC recommendations. Total time of discussion was 25 minutes.

## 2020-10-28 ENCOUNTER — TELEPHONE (OUTPATIENT)
Dept: INTERNAL MEDICINE | Facility: CLINIC | Age: 80
End: 2020-10-28

## 2020-10-28 NOTE — TELEPHONE ENCOUNTER
Caller: Dee Rizzo    Relationship: Self    Best call back number: [phone]     What medication are you requesting: ANTIBIOTIC    What are your current symptoms: COUGHING, CHEST IS TIGHT, NO FEVER, SHORTNESS OF BREATH, CHILLS(COME AND GO)    How long have you been experiencing symptoms:A FEW WEEKS  Have you had these symptoms before:    [x] Yes  [] No    Have you been treated for these symptoms before:   [x] Yes  [] No    If a prescription is needed, what is your preferred pharmacy and phone number:    [id number] [address] AT [facility] - [phone]  - [phone]   [phone]      Additional notes:MS. RIZZO SAYS THAT SHE HAS BEEN FEELING LIKE THIS FOR A FEW WEEKS, SHE SAYS THAT SHE STARTS FEELING BETTER, THEN FEELS WORST.     PLEASE ADVISE

## 2020-11-05 ENCOUNTER — TELEPHONE (OUTPATIENT)
Dept: INTERNAL MEDICINE | Facility: CLINIC | Age: 80
End: 2020-11-05

## 2020-11-05 RX ORDER — NITROFURANTOIN 25; 75 MG/1; MG/1
100 CAPSULE ORAL 2 TIMES DAILY
Qty: 10 CAPSULE | Refills: 0 | Status: SHIPPED | OUTPATIENT
Start: 2020-11-05 | End: 2021-05-18

## 2020-11-05 NOTE — TELEPHONE ENCOUNTER
PATIENT IS CALLING NEEDING TO SEE IF DR. PIZARRO COULD SENT SOME MEDICATION TO HER PHARMACY    PATIENT FEELS LIKE SHE HAS A UTI OR A BLADDER INFECTION    PATIENT IS HAVING  FREQUENT URINATION AND IS VERY UNCOMFORTABLE AND SOMETIMES CAN'T HOLD HER BLADDER    PATIENT HAS NO TRANSPORTATION TO COME TO THE OFFICE    PATIENT IS NEEDING SOME MEDICATION CALL IN    PHARMACY:  [id number] - [place], [place] - [address] AT [facility] - [phone] [facility] [phone]       PLEASE CONTACT PATIENT @[phone]

## 2020-12-05 DIAGNOSIS — E03.9 ACQUIRED HYPOTHYROIDISM: ICD-10-CM

## 2020-12-05 DIAGNOSIS — M81.0 OSTEOPOROSIS, UNSPECIFIED OSTEOPOROSIS TYPE, UNSPECIFIED PATHOLOGICAL FRACTURE PRESENCE: ICD-10-CM

## 2020-12-05 DIAGNOSIS — M54.50 CHRONIC RIGHT-SIDED LOW BACK PAIN WITHOUT SCIATICA: ICD-10-CM

## 2020-12-05 DIAGNOSIS — I10 ESSENTIAL HYPERTENSION: ICD-10-CM

## 2020-12-05 DIAGNOSIS — G89.29 CHRONIC RIGHT-SIDED LOW BACK PAIN WITHOUT SCIATICA: ICD-10-CM

## 2020-12-05 DIAGNOSIS — F41.9 ANXIETY: ICD-10-CM

## 2020-12-07 RX ORDER — GABAPENTIN 800 MG/1
TABLET ORAL
Qty: 90 TABLET | Refills: 0 | Status: SHIPPED | OUTPATIENT
Start: 2020-12-07 | End: 2021-05-18 | Stop reason: SDUPTHER

## 2020-12-07 RX ORDER — RALOXIFENE HYDROCHLORIDE 60 MG/1
60 TABLET, FILM COATED ORAL DAILY
Qty: 90 TABLET | Refills: 3 | Status: SHIPPED | OUTPATIENT
Start: 2020-12-07

## 2020-12-07 RX ORDER — VALSARTAN AND HYDROCHLOROTHIAZIDE 160; 25 MG/1; MG/1
1 TABLET ORAL DAILY
Qty: 90 TABLET | Refills: 3 | Status: SHIPPED | OUTPATIENT
Start: 2020-12-07 | End: 2023-01-23 | Stop reason: SDUPTHER

## 2020-12-07 RX ORDER — LEVOTHYROXINE SODIUM 0.1 MG/1
100 TABLET ORAL EVERY OTHER DAY
Qty: 45 TABLET | Refills: 3 | Status: SHIPPED | OUTPATIENT
Start: 2020-12-07 | End: 2021-05-18 | Stop reason: SDUPTHER

## 2020-12-07 RX ORDER — ESCITALOPRAM OXALATE 10 MG/1
TABLET ORAL
Qty: 135 TABLET | Refills: 3 | Status: SHIPPED | OUTPATIENT
Start: 2020-12-07 | End: 2021-05-18 | Stop reason: SDUPTHER

## 2020-12-07 RX ORDER — LEVOTHYROXINE SODIUM 88 UG/1
88 TABLET ORAL EVERY OTHER DAY
Qty: 45 TABLET | Refills: 3 | Status: SHIPPED | OUTPATIENT
Start: 2020-12-07 | End: 2021-05-18 | Stop reason: SDUPTHER

## 2020-12-10 ENCOUNTER — TELEPHONE (OUTPATIENT)
Dept: INTERNAL MEDICINE | Facility: CLINIC | Age: 80
End: 2020-12-10

## 2020-12-10 NOTE — TELEPHONE ENCOUNTER
PATIENT CALLED STATING THAT SHE IS TRYING TO RETURN A CALL, SHE WILL BE OUT FROM 2 - 3:15 BUT WILL AVAILABLE ALL OTHER TIMES.    PLEASE ADVISE  [phone]

## 2021-02-03 DIAGNOSIS — R21 RASH: ICD-10-CM

## 2021-05-18 ENCOUNTER — OFFICE VISIT (OUTPATIENT)
Dept: INTERNAL MEDICINE | Facility: CLINIC | Age: 81
End: 2021-05-18

## 2021-05-18 VITALS
WEIGHT: 211.4 LBS | HEIGHT: 61 IN | SYSTOLIC BLOOD PRESSURE: 140 MMHG | TEMPERATURE: 97.5 F | HEART RATE: 109 BPM | RESPIRATION RATE: 20 BRPM | DIASTOLIC BLOOD PRESSURE: 90 MMHG | BODY MASS INDEX: 39.91 KG/M2 | OXYGEN SATURATION: 94 %

## 2021-05-18 DIAGNOSIS — G43.409 HEMIPLEGIC MIGRAINE WITHOUT STATUS MIGRAINOSUS, NOT INTRACTABLE: ICD-10-CM

## 2021-05-18 DIAGNOSIS — E03.9 ACQUIRED HYPOTHYROIDISM: ICD-10-CM

## 2021-05-18 DIAGNOSIS — Z78.0 MENOPAUSE: ICD-10-CM

## 2021-05-18 DIAGNOSIS — N39.46 MIXED STRESS AND URGE URINARY INCONTINENCE: ICD-10-CM

## 2021-05-18 DIAGNOSIS — F41.9 ANXIETY: ICD-10-CM

## 2021-05-18 DIAGNOSIS — Z12.31 ENCOUNTER FOR SCREENING MAMMOGRAM FOR BREAST CANCER: ICD-10-CM

## 2021-05-18 DIAGNOSIS — I10 ESSENTIAL HYPERTENSION: ICD-10-CM

## 2021-05-18 DIAGNOSIS — K21.9 GASTROESOPHAGEAL REFLUX DISEASE, UNSPECIFIED WHETHER ESOPHAGITIS PRESENT: ICD-10-CM

## 2021-05-18 DIAGNOSIS — M54.50 CHRONIC RIGHT-SIDED LOW BACK PAIN WITHOUT SCIATICA: ICD-10-CM

## 2021-05-18 DIAGNOSIS — M51.36 DDD (DEGENERATIVE DISC DISEASE), LUMBAR: ICD-10-CM

## 2021-05-18 DIAGNOSIS — F51.01 PRIMARY INSOMNIA: ICD-10-CM

## 2021-05-18 DIAGNOSIS — E55.9 VITAMIN D DEFICIENCY: ICD-10-CM

## 2021-05-18 DIAGNOSIS — Z00.00 MEDICARE ANNUAL WELLNESS VISIT, SUBSEQUENT: Primary | ICD-10-CM

## 2021-05-18 DIAGNOSIS — G89.29 CHRONIC RIGHT-SIDED LOW BACK PAIN WITHOUT SCIATICA: ICD-10-CM

## 2021-05-18 DIAGNOSIS — R42 VERTIGO: ICD-10-CM

## 2021-05-18 DIAGNOSIS — R42 DIZZINESS: ICD-10-CM

## 2021-05-18 DIAGNOSIS — M54.2 NECK PAIN: ICD-10-CM

## 2021-05-18 DIAGNOSIS — M48.061 SPINAL STENOSIS OF LUMBAR REGION WITHOUT NEUROGENIC CLAUDICATION: ICD-10-CM

## 2021-05-18 DIAGNOSIS — M81.0 OSTEOPOROSIS, UNSPECIFIED OSTEOPOROSIS TYPE, UNSPECIFIED PATHOLOGICAL FRACTURE PRESENCE: ICD-10-CM

## 2021-05-18 DIAGNOSIS — F39 MOOD DISORDER (HCC): ICD-10-CM

## 2021-05-18 DIAGNOSIS — R73.09 ELEVATED GLUCOSE: ICD-10-CM

## 2021-05-18 DIAGNOSIS — G44.039 EPISODIC PAROXYSMAL HEMICRANIA, NOT INTRACTABLE: ICD-10-CM

## 2021-05-18 DIAGNOSIS — R53.82 CHRONIC FATIGUE: ICD-10-CM

## 2021-05-18 PROCEDURE — G0439 PPPS, SUBSEQ VISIT: HCPCS | Performed by: INTERNAL MEDICINE

## 2021-05-18 PROCEDURE — 96160 PT-FOCUSED HLTH RISK ASSMT: CPT | Performed by: INTERNAL MEDICINE

## 2021-05-18 PROCEDURE — 99214 OFFICE O/P EST MOD 30 MIN: CPT | Performed by: INTERNAL MEDICINE

## 2021-05-18 PROCEDURE — 1159F MED LIST DOCD IN RCRD: CPT | Performed by: INTERNAL MEDICINE

## 2021-05-18 PROCEDURE — 1126F AMNT PAIN NOTED NONE PRSNT: CPT | Performed by: INTERNAL MEDICINE

## 2021-05-18 PROCEDURE — 1170F FXNL STATUS ASSESSED: CPT | Performed by: INTERNAL MEDICINE

## 2021-05-18 RX ORDER — LEVOTHYROXINE SODIUM 0.1 MG/1
100 TABLET ORAL EVERY OTHER DAY
Qty: 45 TABLET | Refills: 3 | Status: SHIPPED | OUTPATIENT
Start: 2021-05-18 | End: 2021-07-20 | Stop reason: SDUPTHER

## 2021-05-18 RX ORDER — LEVOTHYROXINE SODIUM 88 UG/1
88 TABLET ORAL EVERY OTHER DAY
Qty: 45 TABLET | Refills: 3 | Status: SHIPPED | OUTPATIENT
Start: 2021-05-18 | End: 2021-07-20

## 2021-05-18 RX ORDER — GABAPENTIN 800 MG/1
800 TABLET ORAL DAILY
Qty: 90 TABLET | Refills: 1 | Status: SHIPPED | OUTPATIENT
Start: 2021-05-18 | End: 2022-04-28

## 2021-05-18 RX ORDER — ESCITALOPRAM OXALATE 20 MG/1
TABLET ORAL
Qty: 90 TABLET | Refills: 3 | Status: SHIPPED | OUTPATIENT
Start: 2021-05-18 | End: 2021-08-11 | Stop reason: SDUPTHER

## 2021-05-18 NOTE — PROGRESS NOTES
Chief Complaint  Medicare Wellness-subsequent, Headache (RT sided, can feel movement when talking or moving head a certain way. ), Eye Pain (RT x 1 year pain moves upward on the RT side of head.), Back Pain, and Hand Pain    Subjective          Dee Mac presents to [facility] for   Headache   This is a recurrent problem. The current episode started more than 1 year ago. The problem occurs intermittently. The problem has been gradually worsening. The pain is located in the right unilateral region. The pain radiates to the face and right neck. The pain quality is similar to prior headaches. The quality of the pain is described as shooting. The pain is moderate. Associated symptoms include back pain, dizziness (vertigo off & on), eye pain, eye redness, insomnia, a loss of balance and numbness. Pertinent negatives include no anorexia, coughing, ear pain, fever, nausea, scalp tenderness, seizures, sinus pressure, sore throat, swollen glands, visual change, vomiting, weakness or weight loss.   Eye Pain   The right eye is affected. This is a recurrent problem. The current episode started more than 1 year ago. The problem occurs intermittently. The problem has been gradually worsening. There was no injury mechanism. The pain is moderate. Associated symptoms include eye redness and itching. Pertinent negatives include no eye discharge, fever, nausea, vomiting or weakness. She has tried eye drops for the symptoms. The treatment provided mild relief.   Back Pain  This is a recurrent problem. The current episode started more than 1 year ago. The problem occurs intermittently. The problem has been waxing and waning since onset. The pain is present in the lumbar spine. The quality of the pain is described as aching. The pain is moderate. Associated symptoms include headaches and numbness. Pertinent negatives include no fever, weakness or weight loss.   Hand Pain   The incident occurred more  "than 1 week ago. There was no injury mechanism. The pain is present in the left hand and right hand. The quality of the pain is described as shooting. The pain radiates to the left arm and right arm. The pain is moderate. The pain has been fluctuating since the incident. Associated symptoms include numbness.       Review of Systems   Constitutional: Negative for fever and weight loss.   HENT: Negative for ear pain, sinus pressure and sore throat.    Eyes: Positive for pain, redness and itching. Negative for discharge.   Respiratory: Negative for cough.    Gastrointestinal: Negative for anorexia, nausea and vomiting.   Musculoskeletal: Positive for back pain.   Neurological: Positive for dizziness (vertigo off & on), numbness, headaches and loss of balance. Negative for seizures and weakness.   Psychiatric/Behavioral: The patient has insomnia.         Objective   Vital Signs:   /90 (BP Location: Left arm, Patient Position: Sitting, Cuff Size: Adult)   Pulse 109   Temp 97.5 °F (36.4 °C) (Temporal)   Resp 20   Ht 153.7 cm (60.5\")   Wt 95.9 kg (211 lb 6.4 oz)   SpO2 94%   BMI 40.61 kg/m²     Physical Exam  Vitals and nursing note reviewed.   Constitutional:       General: She is not in acute distress.     Appearance: Normal appearance. She is well-developed. She is obese. She is not ill-appearing or toxic-appearing.   HENT:      Head: Normocephalic and atraumatic.      Right Ear: Tympanic membrane, ear canal and external ear normal.      Left Ear: Tympanic membrane, ear canal and external ear normal.      Nose: Nose normal.      Mouth/Throat:      Pharynx: Oropharynx is clear.   Eyes:      Extraocular Movements: Extraocular movements intact.      Conjunctiva/sclera: Conjunctivae normal.      Pupils: Pupils are equal, round, and reactive to light.   Cardiovascular:      Rate and Rhythm: Normal rate and regular rhythm.      Heart sounds: Normal heart sounds.   Pulmonary:      Effort: No respiratory distress. "      Breath sounds: No wheezing or rales.   Chest:      Chest wall: No tenderness.   Abdominal:      General: There is no distension.      Tenderness: There is no abdominal tenderness.   Musculoskeletal:      Cervical back: Normal range of motion and neck supple.   Skin:     General: Skin is warm and dry.   Neurological:      General: No focal deficit present.      Mental Status: She is alert.      Cranial Nerves: No cranial nerve deficit.      Motor: No weakness.   Psychiatric:         Behavior: Behavior normal.       Non-tender & non -enlarged temporal arteries.    Result Review :                 Assessment and Plan    Problem List Items Addressed This Visit        Unprioritized    DDD (degenerative disc disease), lumbar    Lumbar canal stenosis    Current Assessment & Plan     Need PT (helped in the past)         Chronic right-sided low back pain without sciatica    Current Assessment & Plan     Trial of PT (she declines MRI & epidural injections for now)         Relevant Medications    gabapentin (NEURONTIN) 800 MG tablet    Other Relevant Orders    Ambulatory Referral to Physical Therapy Evaluate and treat, Ortho, Vestibular    Hypothyroidism    Relevant Medications    levothyroxine (SYNTHROID, LEVOTHROID) 100 MCG tablet    levothyroxine (SYNTHROID, LEVOTHROID) 88 MCG tablet    Other Relevant Orders    TSH Rfx On Abnormal To Free T4    Hypertension    Current Assessment & Plan     Hypertension is improving with treatment.  Continue current treatment regimen.  Dietary sodium restriction.  Weight loss.  Regular aerobic exercise.  Blood pressure will be reassessed at the next regular appointment.         Relevant Orders    CBC & Differential    Comprehensive Metabolic Panel    Lipid Panel    Urinalysis With Microscopic If Indicated (No Culture) - Urine, Clean Catch    Mood disorder (CMS/HCC)    Relevant Medications    escitalopram (LEXAPRO) 20 MG tablet    Vitamin D deficiency    Osteoporosis    Migraines     Current Assessment & Plan     Headaches are unchanged.  Continue current treatment regimen.  Advised to keep a headache diary.  Counseled regarding lifestyle modifications.             Relevant Medications    gabapentin (NEURONTIN) 800 MG tablet    escitalopram (LEXAPRO) 20 MG tablet    GERD (gastroesophageal reflux disease)    Insomnia    Current Assessment & Plan     Need to stop sleeping during day!         Chronic fatigue    Current Assessment & Plan     Need incr exercise         Neck pain    Overview     worse for 1 wk         Current Assessment & Plan     Need stretching exercises (but this causes dizziness) - need PT this week -call if no better         Relevant Orders    Ambulatory Referral to Physical Therapy Evaluate and treat, Ortho, Vestibular    Mixed stress and urge urinary incontinence    Current Assessment & Plan     Will refer to urologist when ready (she declines today)         Episodic paroxysmal hemicrania, not intractable    Relevant Medications    gabapentin (NEURONTIN) 800 MG tablet    escitalopram (LEXAPRO) 20 MG tablet    Other Relevant Orders    Sedimentation Rate    Vertigo      Other Visit Diagnoses     Medicare annual wellness visit, subsequent    -  Primary    Elevated glucose        Relevant Orders    Hemoglobin A1c    Dizziness        Relevant Orders    Ambulatory Referral to Physical Therapy Evaluate and treat, Ortho, Vestibular    Anxiety        stable - call if sx    Relevant Medications    escitalopram (LEXAPRO) 20 MG tablet    Menopause        Relevant Orders    DEXA Bone Density Axial    Encounter for screening mammogram for breast cancer        Relevant Orders    Mammo Screening Bilateral With CAD          Follow Up   Return in about 2 months (around 7/18/2021) for Recheck.  Patient was given instructions and counseling regarding her condition or for health maintenance advice. Please see specific information pulled into the AVS if appropriate.      She wants 2 visits today -  wellness & regular visit for chronic problems.

## 2021-05-18 NOTE — ASSESSMENT & PLAN NOTE
Headaches are unchanged.  Continue current treatment regimen.  Advised to keep a headache diary.  Counseled regarding lifestyle modifications.

## 2021-05-18 NOTE — PATIENT INSTRUCTIONS
Medicare Wellness  Personal Prevention Plan of Service     Date of Office Visit:  2021  Encounter Provider:  Kira Mascorro MD  Place of Service:  [facility]  Patient Name: Dee Watts  :  1940    As part of the Medicare Wellness portion of your visit today, we are providing you with this personalized preventive plan of services (PPPS). This plan is based upon recommendations of the [place] Preventive Services Task Force (USPSTF) and the Advisory Committee on Immunization Practices (ACIP).    This lists the preventive care services that should be considered, and provides dates of when you are due. Items listed as completed are up-to-date and do not require any further intervention.    Health Maintenance   Topic Date Due   • DXA SCAN  Never done   • Pneumococcal Vaccine 65+ (1 of 1 - PPSV23) Never done   • ZOSTER VACCINE (2 of 3) 2013   • LIPID PANEL  2019   • INFLUENZA VACCINE  2021   • TDAP/TD VACCINES (2 - Td) 2022   • ANNUAL WELLNESS VISIT  2022   • COVID-19 Vaccine  Completed       Orders Placed This Encounter   Procedures   • Comprehensive Metabolic Panel     Order Specific Question:   Release to patient     Answer:   Immediate   • Lipid Panel   • Urinalysis With Microscopic If Indicated (No Culture) - Urine, Clean Catch     Order Specific Question:   Release to patient     Answer:   Immediate   • TSH Rfx On Abnormal To Free T4     Order Specific Question:   Release to patient     Answer:   Immediate   • Hemoglobin A1c     Order Specific Question:   Release to patient     Answer:   Immediate   • CBC & Differential     Order Specific Question:   Manual Differential     Answer:   No       No follow-ups on file.

## 2021-05-18 NOTE — PROGRESS NOTES
The ABCs of the Annual Wellness Visit  Subsequent Medicare Wellness Visit    Chief Complaint   Patient presents with   • Medicare Wellness-subsequent   • Headache     RT sided, can feel movement when talking or moving head a certain way.    • Eye Pain     RT x 1 year pain moves upward on the RT side of head.       Subjective   History of Present Illness:  Dee Watts is a 80 y.o. female who presents for a Subsequent Medicare Wellness Visit.    HEALTH RISK ASSESSMENT    Recent Hospitalizations:  No hospitalization(s) within the last year.    Current Medical Providers:  Patient Care Team:  Kira Mascorro MD as PCP - General  Pedro Sy MD as Surgeon (Orthopedic Surgery)  Keith Dorsey MD as Surgeon (Orthopedic Surgery)  Kira Beal MD (Dermatology)  Jadon Jones MD as Consulting Physician (Ophthalmology)  Edgar Tello MD as Consulting Physician (Otolaryngology)    Smoking Status:  Social History     Tobacco Use   Smoking Status Former Smoker   • Quit date: 1990   • Years since quittin.3   Smokeless Tobacco Never Used       Alcohol Consumption:  Social History     Substance and Sexual Activity   Alcohol Use Yes    Comment: 1 DRINK PER WEEK       Depression Screen:   PHQ-2/PHQ-9 Depression Screening 2021   Little interest or pleasure in doing things 0   Feeling down, depressed, or hopeless 0   Trouble falling or staying asleep, or sleeping too much 0   Feeling tired or having little energy 0   Poor appetite or overeating 0   Feeling bad about yourself - or that you are a failure or have let yourself or your family down 0   Trouble concentrating on things, such as reading the newspaper or watching television 0   Moving or speaking so slowly that other people could have noticed. Or the opposite - being so fidgety or restless that you have been moving around a lot more than usual 0   Thoughts that you would be better off dead, or of hurting yourself in some way 0   Total  Score 0       Fall Risk Screen:  KARLOS Fall Risk Assessment was completed, and patient is at LOW risk for falls.Assessment completed on:5/18/2021    Health Habits and Functional and Cognitive Screening:  Functional & Cognitive Status 5/18/2021   Do you have difficulty preparing food and eating? No   Do you have difficulty bathing yourself, getting dressed or grooming yourself? No   Do you have difficulty using the toilet? No   Do you have difficulty moving around from place to place? Yes   Do you have trouble with steps or getting out of a bed or a chair? No   Current Diet Unhealthy Diet   Dental Exam Other        Dental Exam Comment Have Dentures   Eye Exam Not up to date   Exercise (times per week) -   Current Exercise Activities Include -   Do you need help using the phone?  No   Are you deaf or do you have serious difficulty hearing?  No   Do you need help with transportation? No   Do you need help shopping? No   Do you need help preparing meals?  Yes   Do you need help with housework?  Yes   Do you need help with laundry? Yes   Do you need help taking your medications? No   Do you need help managing money? No   Do you ever drive or ride in a car without wearing a seat belt? No   Have you felt unusual stress, anger or loneliness in the last month? No   Who do you live with? Community   If you need help, do you have trouble finding someone available to you? No   Have you been bothered in the last four weeks by sexual problems? No   Do you have difficulty concentrating, remembering or making decisions? Yes         Does the patient have evidence of cognitive impairment? No    Asprin use counseling:Does not need ASA (and currently is not on it)    Age-appropriate Screening Schedule:  Refer to the list below for future screening recommendations based on patient's age, sex and/or medical conditions. Orders for these recommended tests are listed in the plan section. The patient has been provided with a written  plan.    Health Maintenance   Topic Date Due   • DXA SCAN  Never done   • ZOSTER VACCINE (2 of 3) 08/22/2013   • LIPID PANEL  08/02/2019   • INFLUENZA VACCINE  08/01/2021   • TDAP/TD VACCINES (2 - Td) 03/27/2022          The following portions of the patient's history were reviewed and updated as appropriate: allergies, current medications, past family history, past medical history, past social history, past surgical history and problem list.    Outpatient Medications Prior to Visit   Medication Sig Dispense Refill   • albuterol sulfate  (90 Base) MCG/ACT inhaler Inhale 2 puffs Every 4 (Four) Hours As Needed for Wheezing. 18 g 0   • escitalopram (LEXAPRO) 10 MG tablet TAKE 1 1/2 TABLETS BY MOUTH EVERY  tablet 3   • fexofenadine (ALLEGRA) 180 MG tablet Take 180 mg by mouth Daily.     • gabapentin (NEURONTIN) 800 MG tablet TAKE 1 TABLET BY MOUTH ONCE DAILY 90 tablet 0   • levothyroxine (SYNTHROID, LEVOTHROID) 100 MCG tablet TAKE 1 TABLET BY MOUTH EVERY OTHER DAY 45 tablet 3   • levothyroxine (SYNTHROID, LEVOTHROID) 88 MCG tablet TAKE 1 TABLET BY MOUTH EVERY OTHER DAY 45 tablet 3   • nystatin-triamcinolone (MYCOLOG II) 411400-6.1 UNIT/GM-% cream APPLY TOPICALLY TO THE AFFECTED AREA AS DIRECTED TWICE DAILY 60 g 3   • raloxifene (EVISTA) 60 MG tablet TAKE 1 TABLET BY MOUTH DAILY 90 tablet 3   • valsartan-hydrochlorothiazide (DIOVAN-HCT) 160-25 MG per tablet TAKE 1 TABLET BY MOUTH DAILY 90 tablet 3   • verapamil SR (CALAN-SR) 240 MG CR tablet Take 1 tablet by mouth Daily. 90 tablet 3   • azithromycin (Zithromax Z-Reyes) 250 MG tablet Take 2 tablets the first day, then 1 tablet daily for 4 days. 6 tablet 0   • baclofen (LIORESAL) 10 MG tablet Take 1 tablet by mouth 3 (Three) Times a Day. Prn muscle spasm 180 tablet 1   • nitrofurantoin, macrocrystal-monohydrate, (Macrobid) 100 MG capsule Take 1 capsule by mouth 2 (Two) Times a Day. 10 capsule 0     No facility-administered medications prior to visit.  "      Patient Active Problem List   Diagnosis   • DDD (degenerative disc disease), lumbar   • Osteoarthritis of hip   • Adult body mass index greater than 30   • Lumbosacral neuritis   • Lumbar canal stenosis   • Chronic right-sided low back pain without sciatica   • Hypothyroidism   • Hypertension   • Mood disorder (CMS/[facility])   • Vitamin D deficiency   • IBS (irritable bowel syndrome)   • Osteoporosis   • Migraines   • GERD (gastroesophageal reflux disease)   • Insomnia   • Chronic breast pain   • Status post total hip replacement, right   • Right hip pain   • Chronic fatigue   • Muscle spasm   • Rash   • Neck pain   • Mixed stress and urge urinary incontinence   • Allergic rhinitis       Advanced Care Planning:  ACP discussion was held with the patient during this visit. Patient has an advance directive (not in EMR), copy requested.    Review of Systems    Compared to one year ago, the patient feels her physical health is worse.  Compared to one year ago, the patient feels her mental health is the same.    Reviewed chart for potential of high risk medication in the elderly: yes  Reviewed chart for potential of harmful drug interactions in the elderly:yes    Objective         Vitals:    05/18/21 1513   BP: 174/100   BP Location: Left arm   Patient Position: Sitting   Cuff Size: Adult   Pulse: 109   Resp: 20   Temp: 97.5 °F (36.4 °C)   TempSrc: Temporal   SpO2: 94%   Weight: 95.9 kg (211 lb 6.4 oz)   Height: 153.7 cm (60.5\")   PainSc: 0-No pain       Body mass index is 40.61 kg/m².  Discussed the patient's BMI with her. The BMI is above average; BMI management plan is completed.    Physical Exam          Assessment/Plan   Medicare Risks and Personalized Health Plan  CMS Preventative Services Quick Reference  Abdominal Aortic Aneurysm Screening  Advance Directive Discussion  Breast Cancer/Mammogram Screening  Chronic Pain   Immunizations Discussed/Encouraged (specific immunizations; Pneumococcal 23 and Shingrix " )  Inactivity/Sedentary  Obesity/Overweight   Osteoporosis Risk  Urinary Incontinence    The above risks/problems have been discussed with the patient.  Pertinent information has been shared with the patient in the After Visit Summary.  Follow up plans and orders are seen below in the Assessment/Plan Section.    There are no diagnoses linked to this encounter.  Follow Up:  No follow-ups on file.     An After Visit Summary and PPPS were given to the patient.      Need screening for AAA & carotid disease.  Information given today.   Need daily strengthening & balance exercises (shown today).

## 2021-05-21 LAB
ALBUMIN SERPL-MCNC: 4.3 G/DL (ref 3.5–5.2)
ALBUMIN/GLOB SERPL: 1.8 G/DL
ALP SERPL-CCNC: 56 U/L (ref 39–117)
ALT SERPL-CCNC: 13 U/L (ref 1–33)
AST SERPL-CCNC: 13 U/L (ref 1–32)
BASOPHILS # BLD AUTO: 0.05 10*3/MM3 (ref 0–0.2)
BASOPHILS NFR BLD AUTO: 0.6 % (ref 0–1.5)
BILIRUB SERPL-MCNC: 0.3 MG/DL (ref 0–1.2)
BUN SERPL-MCNC: 22 MG/DL (ref 8–23)
BUN/CREAT SERPL: 20 (ref 7–25)
CALCIUM SERPL-MCNC: 10.5 MG/DL (ref 8.6–10.5)
CHLORIDE SERPL-SCNC: 107 MMOL/L (ref 98–107)
CHOLEST SERPL-MCNC: 190 MG/DL (ref 0–200)
CO2 SERPL-SCNC: 22.5 MMOL/L (ref 22–29)
CREAT SERPL-MCNC: 1.1 MG/DL (ref 0.57–1)
EOSINOPHIL # BLD AUTO: 0.21 10*3/MM3 (ref 0–0.4)
EOSINOPHIL NFR BLD AUTO: 2.5 % (ref 0.3–6.2)
ERYTHROCYTE [DISTWIDTH] IN BLOOD BY AUTOMATED COUNT: 13.2 % (ref 12.3–15.4)
ERYTHROCYTE [SEDIMENTATION RATE] IN BLOOD: 9 MM/HR (ref 0–20)
GLOBULIN SER CALC-MCNC: 2.4 GM/DL
GLUCOSE SERPL-MCNC: 106 MG/DL (ref 65–99)
HBA1C MFR BLD: 5.8 % (ref 4.8–5.6)
HCT VFR BLD AUTO: 41.8 % (ref 34–46.6)
HDLC SERPL-MCNC: 40 MG/DL (ref 40–60)
HGB BLD-MCNC: 13.9 G/DL (ref 12–15.9)
IMM GRANULOCYTES # BLD AUTO: 0.07 10*3/MM3 (ref 0–0.05)
IMM GRANULOCYTES NFR BLD AUTO: 0.8 % (ref 0–0.5)
LDLC SERPL CALC-MCNC: 128 MG/DL (ref 0–100)
LYMPHOCYTES # BLD AUTO: 2.1 10*3/MM3 (ref 0.7–3.1)
LYMPHOCYTES NFR BLD AUTO: 25.3 % (ref 19.6–45.3)
MCH RBC QN AUTO: 29.9 PG (ref 26.6–33)
MCHC RBC AUTO-ENTMCNC: 33.3 G/DL (ref 31.5–35.7)
MCV RBC AUTO: 89.9 FL (ref 79–97)
MONOCYTES # BLD AUTO: 0.61 10*3/MM3 (ref 0.1–0.9)
MONOCYTES NFR BLD AUTO: 7.3 % (ref 5–12)
NEUTROPHILS # BLD AUTO: 5.27 10*3/MM3 (ref 1.7–7)
NEUTROPHILS NFR BLD AUTO: 63.5 % (ref 42.7–76)
NRBC BLD AUTO-RTO: 0 /100 WBC (ref 0–0.2)
PLATELET # BLD AUTO: 231 10*3/MM3 (ref 140–450)
POTASSIUM SERPL-SCNC: 4.2 MMOL/L (ref 3.5–5.2)
PROT SERPL-MCNC: 6.7 G/DL (ref 6–8.5)
RBC # BLD AUTO: 4.65 10*6/MM3 (ref 3.77–5.28)
SODIUM SERPL-SCNC: 143 MMOL/L (ref 136–145)
T4 FREE SERPL-MCNC: 0.79 NG/DL (ref 0.93–1.7)
TRIGL SERPL-MCNC: 122 MG/DL (ref 0–150)
TSH SERPL DL<=0.005 MIU/L-ACNC: 5.04 UIU/ML (ref 0.27–4.2)
UNABLE TO VOID: NORMAL
VLDLC SERPL CALC-MCNC: 22 MG/DL (ref 5–40)
WBC # BLD AUTO: 8.31 10*3/MM3 (ref 3.4–10.8)

## 2021-05-21 PROCEDURE — 85651 RBC SED RATE NONAUTOMATED: CPT | Performed by: INTERNAL MEDICINE

## 2021-05-21 PROCEDURE — 36415 COLL VENOUS BLD VENIPUNCTURE: CPT | Performed by: INTERNAL MEDICINE

## 2021-05-25 ENCOUNTER — LAB (OUTPATIENT)
Dept: INTERNAL MEDICINE | Facility: CLINIC | Age: 81
End: 2021-05-25

## 2021-05-25 DIAGNOSIS — R82.90 ABNORMAL URINE FINDINGS: Primary | ICD-10-CM

## 2021-05-25 LAB
BACTERIA UR QL AUTO: ABNORMAL /HPF
BILIRUB UR QL STRIP: NEGATIVE
CLARITY UR: ABNORMAL
COLOR UR: YELLOW
GLUCOSE UR STRIP-MCNC: NEGATIVE MG/DL
HGB UR QL STRIP.AUTO: NEGATIVE
HYALINE CASTS UR QL AUTO: ABNORMAL /LPF
KETONES UR QL STRIP: NEGATIVE
LEUKOCYTE ESTERASE UR QL STRIP.AUTO: ABNORMAL
MUCOUS THREADS URNS QL MICRO: ABNORMAL /HPF
NITRITE UR QL STRIP: NEGATIVE
PH UR STRIP.AUTO: 6 [PH] (ref 5–8)
PROT UR QL STRIP: ABNORMAL
RBC # UR: ABNORMAL /HPF
REF LAB TEST METHOD: ABNORMAL
SP GR UR STRIP: 1.02 (ref 1–1.03)
SQUAMOUS #/AREA URNS HPF: ABNORMAL /HPF
TRANS CELLS #/AREA URNS HPF: ABNORMAL /HPF
UROBILINOGEN UR QL STRIP: ABNORMAL
WBC UR QL AUTO: ABNORMAL /HPF

## 2021-05-25 PROCEDURE — 81001 URINALYSIS AUTO W/SCOPE: CPT | Performed by: INTERNAL MEDICINE

## 2021-05-28 LAB
BACTERIA UR CULT: NORMAL
BACTERIA UR CULT: NORMAL

## 2021-07-20 ENCOUNTER — OFFICE VISIT (OUTPATIENT)
Dept: INTERNAL MEDICINE | Facility: CLINIC | Age: 81
End: 2021-07-20

## 2021-07-20 ENCOUNTER — TRANSCRIBE ORDERS (OUTPATIENT)
Dept: MAMMOGRAPHY | Facility: HOSPITAL | Age: 81
End: 2021-07-20

## 2021-07-20 VITALS
WEIGHT: 210 LBS | SYSTOLIC BLOOD PRESSURE: 144 MMHG | RESPIRATION RATE: 20 BRPM | TEMPERATURE: 98.2 F | BODY MASS INDEX: 39.65 KG/M2 | HEART RATE: 90 BPM | HEIGHT: 61 IN | OXYGEN SATURATION: 97 % | DIASTOLIC BLOOD PRESSURE: 87 MMHG

## 2021-07-20 DIAGNOSIS — R06.00 DYSPNEA, UNSPECIFIED TYPE: ICD-10-CM

## 2021-07-20 DIAGNOSIS — E03.9 ACQUIRED HYPOTHYROIDISM: ICD-10-CM

## 2021-07-20 DIAGNOSIS — R53.82 CHRONIC FATIGUE: ICD-10-CM

## 2021-07-20 DIAGNOSIS — I10 ESSENTIAL HYPERTENSION: Primary | ICD-10-CM

## 2021-07-20 DIAGNOSIS — R21 RASH: ICD-10-CM

## 2021-07-20 DIAGNOSIS — F39 MOOD DISORDER (HCC): ICD-10-CM

## 2021-07-20 DIAGNOSIS — Z12.31 VISIT FOR SCREENING MAMMOGRAM: Primary | ICD-10-CM

## 2021-07-20 PROCEDURE — 99213 OFFICE O/P EST LOW 20 MIN: CPT | Performed by: INTERNAL MEDICINE

## 2021-07-20 RX ORDER — ALBUTEROL SULFATE 90 UG/1
2 AEROSOL, METERED RESPIRATORY (INHALATION) EVERY 4 HOURS PRN
Qty: 18 G | Refills: 6 | Status: SHIPPED | OUTPATIENT
Start: 2021-07-20

## 2021-07-20 RX ORDER — LEVOTHYROXINE SODIUM 0.1 MG/1
100 TABLET ORAL DAILY
Qty: 90 TABLET | Refills: 3 | Status: SHIPPED | OUTPATIENT
Start: 2021-07-20 | End: 2022-10-10

## 2021-07-20 NOTE — PROGRESS NOTES
"Chief Complaint  Hypertension (F/u.) and Hypothyroidism    Subjective          Dee Mac presents to [facility] for   Hypertension  This is a chronic problem. The current episode started more than 1 year ago. The problem is unchanged. The problem is controlled.   Hypothyroidism  This is a chronic problem. The current episode started more than 1 year ago. The problem occurs constantly. The problem has been unchanged.       Review of Systems     Objective   Vital Signs:   /87 (BP Location: Right arm, Patient Position: Sitting, Cuff Size: Large Adult)   Pulse 90   Temp 98.2 °F (36.8 °C)   Resp 20   Ht 153.7 cm (60.5\")   Wt 95.3 kg (210 lb)   SpO2 97%   BMI 40.34 kg/m²     Physical Exam  Vitals and nursing note reviewed.   Constitutional:       General: She is not in acute distress.     Appearance: She is well-developed.   Cardiovascular:      Rate and Rhythm: Normal rate and regular rhythm.      Heart sounds: Normal heart sounds.   Pulmonary:      Effort: No respiratory distress.      Breath sounds: No wheezing or rales.   Chest:      Chest wall: No tenderness.   Psychiatric:         Behavior: Behavior normal.        Result Review :                 Assessment and Plan    Problem List Items Addressed This Visit        Unprioritized    Hypothyroidism    Current Assessment & Plan     She accidentally took synthroid 200 mg daily - she will only take synthroid 100 mcg daily now.         Relevant Medications    levothyroxine (SYNTHROID, LEVOTHROID) 100 MCG tablet    Hypertension - Primary    Current Assessment & Plan     Hypertension is improving with treatment.  Continue current treatment regimen.  Dietary sodium restriction.  Weight loss.  Regular aerobic exercise.  Continue current medications.  Blood pressure will be reassessed at the next regular appointment.         Mood disorder (CMS/HCC)    Current Assessment & Plan     Psychological condition is improving with " treatment.  Continue current treatment regimen.  Regular aerobic exercise.  Psychological condition  will be reassessed at the next regular appointment.         Chronic fatigue    Current Assessment & Plan     Call if worse         Rash    Overview     frequent tinea -call if worse         Relevant Medications    nystatin-triamcinolone (MYCOLOG II) 092310-9.1 UNIT/GM-% cream    Dyspnea    Current Assessment & Plan     Stable with albuterol 1-2x weekly -need to call if incr sx         Relevant Medications    albuterol sulfate  (90 Base) MCG/ACT inhaler          Follow Up {Instructions Charge Capture  Follow-up Communications :23}  Return in about 4 months (around 11/19/2021) for Recheck.  Patient was given instructions and counseling regarding her condition or for health maintenance advice. Please see specific information pulled into the AVS if appropriate.

## 2021-08-04 NOTE — PROGRESS NOTES
"Subjective   Dee Mac is a 77 y.o. female here for   Chief Complaint   Patient presents with   • Medicare Subsequent Wellness   • Hypertension   • Back Pain   • Breast Pain   .    Vitals:    01/18/18 1318 01/18/18 1413   BP: 152/100 150/90   BP Location: Left arm    Patient Position: Sitting    Cuff Size: Large Adult    Pulse: 82    Resp: 17    Temp: 98.1 °F (36.7 °C)    TempSrc: Temporal Artery     SpO2: 98%    Weight: 91.9 kg (202 lb 9.6 oz)    Height: 154.3 cm (60.75\")        Body mass index is 38.6 kg/(m^2).    Hypertension   This is a chronic problem. The current episode started more than 1 year ago. The problem is unchanged. The problem is controlled. Associated symptoms include malaise/fatigue and shortness of breath (with exertion (b/c back pain)). Pertinent negatives include no chest pain, palpitations or peripheral edema.   Breast Pain   This is a chronic problem. The current episode started more than 1 month ago. The problem occurs constantly. The problem has been gradually worsening. Associated symptoms include fatigue. Pertinent negatives include no chest pain, chills, coughing or fever.   Back Pain   This is a chronic problem. The current episode started more than 1 year ago. The problem occurs constantly. The problem is unchanged. The pain is present in the lumbar spine. The pain does not radiate. The pain is severe. Pertinent negatives include no chest pain, dysuria or fever.        The following portions of the patient's history were reviewed and updated as appropriate: allergies, current medications, past social history and problem list.    Review of Systems   Constitutional: Positive for fatigue and malaise/fatigue. Negative for chills and fever.   Respiratory: Positive for shortness of breath (with exertion (b/c back pain)). Negative for cough and wheezing.    Cardiovascular: Negative for chest pain, palpitations and leg swelling.   Genitourinary: Positive for urgency. Negative for " dysuria, hematuria and vaginal bleeding.   Musculoskeletal: Positive for back pain.   Psychiatric/Behavioral: Positive for dysphoric mood (stressful moving out of house) and sleep disturbance (ok with gabapentin). The patient is not nervous/anxious.        Objective   Physical Exam   Constitutional: She appears well-developed and well-nourished. No distress.   Cardiovascular: Normal rate, regular rhythm and normal heart sounds.    Pulmonary/Chest: No respiratory distress. She has no wheezes. She has no rales. She exhibits no tenderness. Right breast exhibits no inverted nipple, no mass, no nipple discharge, no skin change and no tenderness. Left breast exhibits no inverted nipple, no mass, no nipple discharge, no skin change and no tenderness.   Musculoskeletal: She exhibits no edema.   Psychiatric: She has a normal mood and affect. Her behavior is normal.   Nursing note and vitals reviewed.    Decreased ROM of lumbar spine b/c pain.    Assessment/Plan   Diagnoses and all orders for this visit:    Essential hypertension  Comments:  high today (but no med today)- need low salt & wt loss - need daily bp chk - will need additional meds if over 140/90  Orders:  -     verapamil SR (CALAN-SR) 240 MG CR tablet; Take 1 tablet by mouth Daily.  -     valsartan-hydrochlorothiazide (DIOVAN-HCT) 160-25 MG per tablet; Take 1 tablet by mouth Daily.  -     Comprehensive Metabolic Panel; Future  -     Comprehensive Metabolic Panel    Gastroesophageal reflux disease, esophagitis presence not specified  Comments:  call if worse    Irritable bowel syndrome with diarrhea  Comments:  need daily metamucil    Acquired hypothyroidism  Comments:  continue synthroid 88 alt with 100mcg qod - she did not incr dose b/c had missed pills last bld test  Orders:  -     levothyroxine (SYNTHROID, LEVOTHROID) 100 MCG tablet; Take 1 tablet by mouth Daily.  -     TSH; Future  -     TSH    Chronic right-sided low back pain without  sciatica  Comments:  need to see dr mijares again  Orders:  -     gabapentin (NEURONTIN) 800 MG tablet; Take 1 tablet by mouth Daily.  -     baclofen (LIORESAL) 20 MG tablet; Take 0.5 tablets by mouth 3 (Three) Times a Day As Needed for Muscle Spasms.  -     Ambulatory Referral to Orthopedic Surgery    Osteoporosis, unspecified osteoporosis type, unspecified pathological fracture presence  Comments:  need bone d  Orders:  -     raloxifene (EVISTA) 60 MG tablet; Take 1 tablet by mouth Daily.    Mood disorder  Comments:  stable - call if sx  Orders:  -     escitalopram (LEXAPRO) 10 MG tablet; 1.5 pill    Medicare annual wellness visit, subsequent    Menopause  -     DEXA Bone Density Axial; Future    Chronic breast pain  Comments:  thick breasts bilat - need further eval  Orders:  -     Mammo Diagnostic Bilateral With CAD; Future  -     US Breast Bilateral Complete; Future    Incontinence in female  Comments:  mild - call if worse    Pain of right hip joint  Comments:  keep appt with ortho       Wellness today.    Need daily strengthening & balance exercises (shown today).   Need screening for AAA & carotid disease.  Information given today.        Irregular

## 2021-08-07 ENCOUNTER — HOSPITAL ENCOUNTER (OUTPATIENT)
Dept: MAMMOGRAPHY | Facility: HOSPITAL | Age: 81
Discharge: HOME | End: 2021-08-07
Admitting: INTERNAL MEDICINE

## 2021-08-07 DIAGNOSIS — Z12.31 VISIT FOR SCREENING MAMMOGRAM: ICD-10-CM

## 2021-08-07 PROCEDURE — 77067 SCR MAMMO BI INCL CAD: CPT

## 2021-08-07 PROCEDURE — 77063 BREAST TOMOSYNTHESIS BI: CPT

## 2021-08-09 NOTE — PROGRESS NOTES
Please notify patient of results:  IMPRESSION:  1. There is no evidence for malignancy or significant change in either  breast. Routine followup mammography is recommended.

## 2021-08-11 ENCOUNTER — TELEPHONE (OUTPATIENT)
Dept: INTERNAL MEDICINE | Facility: CLINIC | Age: 81
End: 2021-08-11

## 2021-08-11 DIAGNOSIS — F41.9 ANXIETY: ICD-10-CM

## 2021-08-11 DIAGNOSIS — I10 ESSENTIAL HYPERTENSION: ICD-10-CM

## 2021-08-11 RX ORDER — FEXOFENADINE HCL 180 MG/1
180 TABLET ORAL DAILY
Qty: 90 TABLET | Refills: 1 | Status: SHIPPED | OUTPATIENT
Start: 2021-08-11 | End: 2022-11-23 | Stop reason: SDUPTHER

## 2021-08-11 RX ORDER — ESCITALOPRAM OXALATE 20 MG/1
TABLET ORAL
Qty: 90 TABLET | Refills: 1 | Status: SHIPPED | OUTPATIENT
Start: 2021-08-11 | End: 2022-07-18

## 2021-08-11 RX ORDER — VERAPAMIL HYDROCHLORIDE 240 MG/1
240 TABLET, FILM COATED, EXTENDED RELEASE ORAL DAILY
Qty: 90 TABLET | Refills: 1 | Status: SHIPPED | OUTPATIENT
Start: 2021-08-11 | End: 2022-07-18

## 2021-08-11 NOTE — TELEPHONE ENCOUNTER
I spoke with Pharmacist. Pharmacist stated that they did receive Pt's RX's. Pt never went to the pharmacy to  RX when they were ready for . Pharmacy stated that after 12 days they put medications back.     Pharmacy stated that they will get RX's ready once again and contact the Pt when ready for .     Refill sent

## 2021-08-11 NOTE — TELEPHONE ENCOUNTER
Caller: WattsDee    Relationship: Self    Best call back number: [phone]     Medication needed: PATIENT IS CALLING TO STATE THE [facility] TOLD HER THEY DID NOT RECEIVE THESE FIRST 3 MEDICATIONS THAT WERE SENT IN ON 07/20/21.     albuterol sulfate  (90 Base) MCG/ACT inhaler      levothyroxine (SYNTHROID, LEVOTHROID) 100 MCG tablet      nystatin-triamcinolone (MYCOLOG II) 008492-5.1 UNIT/GM-% cream     SHE STATES SHE ALSO NEEDS REFILLS OF THE FOLLOWING MEDICATIONS:  escitalopram (LEXAPRO) 20 MG tablet  BACLOFEN 10 MG- 1 TABLET 3 TIMES A DAY FOR MUSCLE SPASMS  fexofenadine (ALLEGRA) 180 MG tablet   verapamil SR (CALAN-SR) 240 MG CR tablet       When do you need the refill by: 08/11/21    What additional details did the patient provide when requesting the medication: PATIENT IS CALLING TO STATE THE FIRST 3 MEDICATIONS WERE NOT RECEIVED BY [facility]    Does the patient have less than a 3 day supply:  [] Yes  [x] No    What is the patient's preferred pharmacy:    [id number] - [address] AT [facility] - [phone] [facility] [phone]   [phone]    PLEASE ADVISE.

## 2021-11-22 ENCOUNTER — TELEPHONE (OUTPATIENT)
Dept: INTERNAL MEDICINE | Facility: CLINIC | Age: 81
End: 2021-11-22

## 2021-11-22 NOTE — TELEPHONE ENCOUNTER
PATIENT FORGOT TO SET ALARM FOR HER APPT TODAY     SHE WANTED TO SEE DR PIZARRO BEFORE SHE RETIRED IN December    CAN YOU CALL HER IF THERE ARE ANY CANCELLATIONS     Dee Watts (Self) [phone] (H)

## 2021-12-28 ENCOUNTER — OFFICE VISIT (OUTPATIENT)
Dept: INTERNAL MEDICINE | Facility: CLINIC | Age: 81
End: 2021-12-28

## 2021-12-28 VITALS
HEIGHT: 60 IN | WEIGHT: 212.6 LBS | RESPIRATION RATE: 16 BRPM | TEMPERATURE: 97.8 F | DIASTOLIC BLOOD PRESSURE: 78 MMHG | SYSTOLIC BLOOD PRESSURE: 140 MMHG | BODY MASS INDEX: 41.74 KG/M2

## 2021-12-28 DIAGNOSIS — E03.9 ACQUIRED HYPOTHYROIDISM: Chronic | ICD-10-CM

## 2021-12-28 DIAGNOSIS — E55.9 VITAMIN D DEFICIENCY: Chronic | ICD-10-CM

## 2021-12-28 DIAGNOSIS — E66.01 OBESITY, MORBID: ICD-10-CM

## 2021-12-28 DIAGNOSIS — J30.89 NON-SEASONAL ALLERGIC RHINITIS, UNSPECIFIED TRIGGER: Chronic | ICD-10-CM

## 2021-12-28 DIAGNOSIS — F39 MOOD DISORDER: Chronic | ICD-10-CM

## 2021-12-28 DIAGNOSIS — M54.17 LUMBOSACRAL NEURITIS: Chronic | ICD-10-CM

## 2021-12-28 DIAGNOSIS — G44.039 EPISODIC PAROXYSMAL HEMICRANIA, NOT INTRACTABLE: ICD-10-CM

## 2021-12-28 DIAGNOSIS — R42 VERTIGO: Chronic | ICD-10-CM

## 2021-12-28 DIAGNOSIS — I10 PRIMARY HYPERTENSION: Primary | Chronic | ICD-10-CM

## 2021-12-28 PROBLEM — M51.36 DDD (DEGENERATIVE DISC DISEASE), LUMBAR: Chronic | Status: ACTIVE | Noted: 2017-04-11

## 2021-12-28 PROBLEM — N39.46 MIXED STRESS AND URGE URINARY INCONTINENCE: Chronic | Status: ACTIVE | Noted: 2020-04-21

## 2021-12-28 PROBLEM — M48.061 LUMBAR CANAL STENOSIS: Chronic | Status: ACTIVE | Noted: 2017-04-11

## 2021-12-28 PROBLEM — G89.29 CHRONIC BREAST PAIN: Chronic | Status: ACTIVE | Noted: 2018-01-18

## 2021-12-28 PROBLEM — N64.4 CHRONIC BREAST PAIN: Chronic | Status: ACTIVE | Noted: 2018-01-18

## 2021-12-28 PROBLEM — M51.369 DDD (DEGENERATIVE DISC DISEASE), LUMBAR: Chronic | Status: ACTIVE | Noted: 2017-04-11

## 2021-12-28 PROCEDURE — 36415 COLL VENOUS BLD VENIPUNCTURE: CPT | Performed by: NURSE PRACTITIONER

## 2021-12-28 PROCEDURE — 99214 OFFICE O/P EST MOD 30 MIN: CPT | Performed by: NURSE PRACTITIONER

## 2021-12-29 LAB
CRP SERPL-MCNC: 3 MG/L (ref 0–10)
ERYTHROCYTE [SEDIMENTATION RATE] IN BLOOD BY WESTERGREN METHOD: 30 MM/HR (ref 0–40)

## 2022-01-01 PROBLEM — J30.9 ALLERGIC RHINITIS: Chronic | Status: ACTIVE | Noted: 2020-06-05

## 2022-01-01 PROBLEM — E66.01 OBESITY, MORBID: Status: ACTIVE | Noted: 2022-01-01

## 2022-01-01 PROBLEM — R42 VERTIGO: Chronic | Status: ACTIVE | Noted: 2021-05-18

## 2022-01-01 PROBLEM — E66.01 OBESITY, MORBID (HCC): Chronic | Status: ACTIVE | Noted: 2022-01-01

## 2022-01-01 PROBLEM — G44.039 EPISODIC PAROXYSMAL HEMICRANIA, NOT INTRACTABLE: Chronic | Status: ACTIVE | Noted: 2021-05-18

## 2022-01-01 NOTE — ASSESSMENT & PLAN NOTE
Hypertension is unchanged.  Continue current treatment regimen.  Dietary sodium restriction.  Blood pressure will be reassessed at the next regular appointment.  She will continue Valsartan-HCTZ and Verapamil which she is tolerating well, BP is mildly elevated today but she has not taken her medication yet.

## 2022-01-01 NOTE — ASSESSMENT & PLAN NOTE
She c/o persistent low back pain radiating into the left leg with tingling, recently started on Gabapentin which has been helpful.  She also takes Tylenol Arthritis for pain control which has been helpful.

## 2022-01-01 NOTE — ASSESSMENT & PLAN NOTE
She c/o intermittent episodes of feeling lightheaded for years, last fall appt 2 months ago due to loss of balance with change in position

## 2022-01-10 LAB — REF LAB TEST METHOD: NORMAL

## 2022-04-27 DIAGNOSIS — M54.50 CHRONIC RIGHT-SIDED LOW BACK PAIN WITHOUT SCIATICA: ICD-10-CM

## 2022-04-27 DIAGNOSIS — G89.29 CHRONIC RIGHT-SIDED LOW BACK PAIN WITHOUT SCIATICA: ICD-10-CM

## 2022-04-28 RX ORDER — GABAPENTIN 800 MG/1
800 TABLET ORAL DAILY
Qty: 90 TABLET | Refills: 0 | Status: SHIPPED | OUTPATIENT
Start: 2022-04-28 | End: 2022-09-14 | Stop reason: SDUPTHER

## 2022-05-06 ENCOUNTER — OFFICE VISIT (OUTPATIENT)
Dept: INTERNAL MEDICINE | Facility: CLINIC | Age: 82
End: 2022-05-06

## 2022-05-06 VITALS
OXYGEN SATURATION: 93 % | HEART RATE: 96 BPM | HEIGHT: 60 IN | BODY MASS INDEX: 41.11 KG/M2 | DIASTOLIC BLOOD PRESSURE: 86 MMHG | WEIGHT: 209.4 LBS | SYSTOLIC BLOOD PRESSURE: 138 MMHG | TEMPERATURE: 97.5 F

## 2022-05-06 DIAGNOSIS — F39 MOOD DISORDER: ICD-10-CM

## 2022-05-06 DIAGNOSIS — E66.01 OBESITY, MORBID: ICD-10-CM

## 2022-05-06 DIAGNOSIS — I10 PRIMARY HYPERTENSION: Chronic | ICD-10-CM

## 2022-05-06 DIAGNOSIS — S06.5XAA SUBDURAL HEMATOMA: ICD-10-CM

## 2022-05-06 DIAGNOSIS — E03.9 ACQUIRED HYPOTHYROIDISM: Primary | Chronic | ICD-10-CM

## 2022-05-06 DIAGNOSIS — M48.061 SPINAL STENOSIS OF LUMBAR REGION WITHOUT NEUROGENIC CLAUDICATION: Chronic | ICD-10-CM

## 2022-05-06 PROCEDURE — 99214 OFFICE O/P EST MOD 30 MIN: CPT | Performed by: NURSE PRACTITIONER

## 2022-05-06 RX ORDER — LEVOTHYROXINE SODIUM 88 UG/1
88 TABLET ORAL EVERY OTHER DAY
COMMUNITY
Start: 2022-04-29 | End: 2022-07-12

## 2022-05-07 LAB
ALBUMIN SERPL-MCNC: 4.4 G/DL (ref 3.6–4.6)
ALBUMIN/GLOB SERPL: 1.6 {RATIO} (ref 1.2–2.2)
ALP SERPL-CCNC: 66 IU/L (ref 44–121)
ALT SERPL-CCNC: 13 IU/L (ref 0–32)
AST SERPL-CCNC: 15 IU/L (ref 0–40)
BASOPHILS # BLD AUTO: 0.1 X10E3/UL (ref 0–0.2)
BASOPHILS NFR BLD AUTO: 1 %
BILIRUB SERPL-MCNC: 0.2 MG/DL (ref 0–1.2)
BUN SERPL-MCNC: 20 MG/DL (ref 8–27)
BUN/CREAT SERPL: 18 (ref 12–28)
CALCIUM SERPL-MCNC: 11.1 MG/DL (ref 8.7–10.3)
CHLORIDE SERPL-SCNC: 104 MMOL/L (ref 96–106)
CHOLEST SERPL-MCNC: 184 MG/DL (ref 100–199)
CO2 SERPL-SCNC: 20 MMOL/L (ref 20–29)
CREAT SERPL-MCNC: 1.12 MG/DL (ref 0.57–1)
EGFRCR SERPLBLD CKD-EPI 2021: 49 ML/MIN/1.73
EOSINOPHIL # BLD AUTO: 0.3 X10E3/UL (ref 0–0.4)
EOSINOPHIL NFR BLD AUTO: 3 %
ERYTHROCYTE [DISTWIDTH] IN BLOOD BY AUTOMATED COUNT: 13.1 % (ref 11.7–15.4)
GLOBULIN SER CALC-MCNC: 2.8 G/DL (ref 1.5–4.5)
GLUCOSE SERPL-MCNC: 116 MG/DL (ref 65–99)
HCT VFR BLD AUTO: 45.5 % (ref 34–46.6)
HDLC SERPL-MCNC: 40 MG/DL
HGB BLD-MCNC: 15.1 G/DL (ref 11.1–15.9)
IMM GRANULOCYTES # BLD AUTO: 0.1 X10E3/UL (ref 0–0.1)
IMM GRANULOCYTES NFR BLD AUTO: 1 %
LDLC SERPL CALC-MCNC: 121 MG/DL (ref 0–99)
LYMPHOCYTES # BLD AUTO: 2.2 X10E3/UL (ref 0.7–3.1)
LYMPHOCYTES NFR BLD AUTO: 24 %
MCH RBC QN AUTO: 30.2 PG (ref 26.6–33)
MCHC RBC AUTO-ENTMCNC: 33.2 G/DL (ref 31.5–35.7)
MCV RBC AUTO: 91 FL (ref 79–97)
MONOCYTES # BLD AUTO: 0.7 X10E3/UL (ref 0.1–0.9)
MONOCYTES NFR BLD AUTO: 8 %
NEUTROPHILS # BLD AUTO: 5.7 X10E3/UL (ref 1.4–7)
NEUTROPHILS NFR BLD AUTO: 63 %
PLATELET # BLD AUTO: 245 X10E3/UL (ref 150–450)
POTASSIUM SERPL-SCNC: 4.4 MMOL/L (ref 3.5–5.2)
PROT SERPL-MCNC: 7.2 G/DL (ref 6–8.5)
RBC # BLD AUTO: 5 X10E6/UL (ref 3.77–5.28)
SODIUM SERPL-SCNC: 141 MMOL/L (ref 134–144)
TRIGL SERPL-MCNC: 125 MG/DL (ref 0–149)
TSH SERPL DL<=0.005 MIU/L-ACNC: 2.13 UIU/ML (ref 0.45–4.5)
VLDLC SERPL CALC-MCNC: 23 MG/DL (ref 5–40)
WBC # BLD AUTO: 9 X10E3/UL (ref 3.4–10.8)

## 2022-05-13 DIAGNOSIS — E83.52 SERUM CALCIUM ELEVATED: Primary | ICD-10-CM

## 2022-05-30 NOTE — ASSESSMENT & PLAN NOTE
Hypertension is unchanged.  Continue current treatment regimen.  Dietary sodium restriction.  Blood pressure will be reassessed at the next regular appointment.  BP is mildly elevated in the office today, she will continue Valsartan-HCTZ and Verapamil with home BP monitoring. Call office if readings are consistently >140/90.

## 2022-05-30 NOTE — ASSESSMENT & PLAN NOTE
Patient's (Body mass index is 40.9 kg/m².) indicates that they are morbidly obese (BMI > 40 or > 35 with obesity - related health condition) with health conditions that include hypertension . Weight is unchanged. BMI is is above average; BMI management plan is completed. We discussed portion control and increasing exercise.

## 2022-05-30 NOTE — PROGRESS NOTES
"Chief Complaint  Hypertension, Hypothyroidism, and Back Pain    Subjective          Dee Mac presents to [facility] for f/u regarding HTN, hypothyroidism and chronic low back pain.    She has a hx of chronic vertigo, c/o a fall 1/16 where she struck her head. She presented to [place] [facility] due to a small laceration to the scalp (did not require sutures) and was found to have a small SDH. She was managed overnight, repeat CT scan in morning showed smaller SDH. She was released without further workup-reports feeling well without headache and/or nausea/vomiting.  Her calcium and PTH were elevated during admission.         Objective   Vital Signs:  /86 (BP Location: Left arm, Patient Position: Sitting, Cuff Size: Adult)   Pulse 96   Temp 97.5 °F (36.4 °C) (Temporal)   Ht 152.4 cm (60\")   Wt 95 kg (209 lb 6.4 oz)   SpO2 93%   BMI 40.90 kg/m²   BMI has not been calculated during today's encounter.       Physical Exam  Constitutional:       Appearance: She is well-developed. She is not ill-appearing.   HENT:      Head: Normocephalic.      Right Ear: Hearing, tympanic membrane and external ear normal.      Left Ear: Hearing, tympanic membrane and external ear normal.      Nose: Nose normal. No nasal deformity, mucosal edema or rhinorrhea.      Right Sinus: No maxillary sinus tenderness or frontal sinus tenderness.      Left Sinus: No maxillary sinus tenderness or frontal sinus tenderness.      Mouth/Throat:      Dentition: Normal dentition.   Eyes:      General: Lids are normal.         Right eye: No discharge.         Left eye: No discharge.      Conjunctiva/sclera: Conjunctivae normal.      Right eye: No exudate.     Left eye: No exudate.  Neck:      Thyroid: No thyroid mass or thyromegaly.      Vascular: No carotid bruit.      Trachea: Trachea normal.   Cardiovascular:      Rate and Rhythm: Regular rhythm.      Pulses: Normal pulses.      Heart sounds: Normal heart sounds. " No murmur heard.  Pulmonary:      Effort: No respiratory distress.      Breath sounds: Normal breath sounds. No decreased breath sounds, wheezing, rhonchi or rales.   Abdominal:      General: Bowel sounds are normal.      Palpations: Abdomen is soft.      Tenderness: There is no abdominal tenderness.   Musculoskeletal:      Cervical back: Normal range of motion. No edema.   Lymphadenopathy:      Head:      Right side of head: No submental, submandibular, tonsillar, preauricular, posterior auricular or occipital adenopathy.      Left side of head: No submental, submandibular, tonsillar, preauricular, posterior auricular or occipital adenopathy.   Skin:     General: Skin is warm and dry.      Nails: There is no clubbing.   Neurological:      Mental Status: She is alert.   Psychiatric:         Behavior: Behavior is cooperative.        Result Review :   The following data was reviewed by: MARTA Brink on 05/06/2022:  Common labs    Common Labsle 1/17/22 1/17/22 5/6/22 5/6/22 5/6/22    0425 0425 1035 1035 1035   Glucose    116 (A)    BUN    20    Creatinine    1.12 (A)    Sodium    141    Potassium    4.4    Chloride    104    Calcium  10.6 (A)  11.1 (A)    Total Protein    7.2    Albumin    4.4    Total Bilirubin    0.2    Alkaline Phosphatase    66    AST (SGOT)    15    ALT (SGPT)    13    WBC 9.91  9.0     Hemoglobin 13.9  15.1     Hematocrit 42.7  45.5     Platelets 222  245     Total Cholesterol     184   Triglycerides     125   HDL Cholesterol     40   LDL Cholesterol      121 (A)   (A) Abnormal value            Data reviewed: Recent hospitalization notes 1/16/2022 ([facility])          Assessment and Plan    Diagnoses and all orders for this visit:    1. Acquired hypothyroidism (Primary)  Assessment & Plan:  She is currently managed on Synthroid 88 mcg daily-recheck TSH.    Orders:  -     TSH    2. Primary hypertension  Assessment & Plan:  Hypertension is unchanged.  Continue current treatment  regimen.  Dietary sodium restriction.  Blood pressure will be reassessed at the next regular appointment.  BP is mildly elevated in the office today, she will continue Valsartan-HCTZ and Verapamil with home BP monitoring. Call office if readings are consistently >140/90.    Orders:  -     CBC & Differential  -     Comprehensive Metabolic Panel  -     Lipid Panel    3. Spinal stenosis of lumbar region without neurogenic claudication  Assessment & Plan:  She low back pain radiating into bilateral legs, worse with prolonged standing and/or walking. She takes Gabapentin nightly for mgmt of pain.      4. Subdural hematoma (HCC)  Assessment & Plan:  1/16/2022: Struck head due to vertigo, admitted overnight at [place] due small SDH that was actually smaller in the morning. No lingering symptoms.      5. Mood disorder (HCC)  Assessment & Plan:  Sx stable on Lexapro which she is tolerating well.      6. Obesity, morbid (HCC)  Assessment & Plan:  Patient's (Body mass index is 40.9 kg/m².) indicates that they are morbidly obese (BMI > 40 or > 35 with obesity - related health condition) with health conditions that include hypertension . Weight is unchanged. BMI is is above average; BMI management plan is completed. We discussed portion control and increasing exercise.            Follow Up   Return in about 4 months (around 9/6/2022).  Patient was given instructions and counseling regarding her condition or for health maintenance advice. Please see specific information pulled into the AVS if appropriate.

## 2022-05-30 NOTE — ASSESSMENT & PLAN NOTE
She low back pain radiating into bilateral legs, worse with prolonged standing and/or walking. She takes Gabapentin nightly for mgmt of pain.

## 2022-05-30 NOTE — ASSESSMENT & PLAN NOTE
1/16/2022: Struck head due to vertigo, admitted overnight at [place] due small SDH that was actually smaller in the morning. No lingering symptoms.

## 2022-07-12 RX ORDER — LEVOTHYROXINE SODIUM 88 UG/1
TABLET ORAL
Qty: 45 TABLET | Refills: 0 | Status: SHIPPED | OUTPATIENT
Start: 2022-07-12 | End: 2022-07-18

## 2022-07-16 DIAGNOSIS — F41.9 ANXIETY: ICD-10-CM

## 2022-07-16 DIAGNOSIS — I10 ESSENTIAL HYPERTENSION: ICD-10-CM

## 2022-07-18 RX ORDER — VERAPAMIL HYDROCHLORIDE 240 MG/1
240 TABLET, FILM COATED, EXTENDED RELEASE ORAL DAILY
Qty: 90 TABLET | Refills: 1 | Status: SHIPPED | OUTPATIENT
Start: 2022-07-18

## 2022-07-18 RX ORDER — LEVOTHYROXINE SODIUM 88 UG/1
TABLET ORAL
Qty: 45 TABLET | Refills: 0 | Status: SHIPPED | OUTPATIENT
Start: 2022-07-18 | End: 2023-02-20 | Stop reason: SDUPTHER

## 2022-07-18 RX ORDER — ESCITALOPRAM OXALATE 20 MG/1
TABLET ORAL
Qty: 90 TABLET | Refills: 1 | Status: SHIPPED | OUTPATIENT
Start: 2022-07-18

## 2022-09-14 ENCOUNTER — OFFICE VISIT (OUTPATIENT)
Dept: INTERNAL MEDICINE | Facility: CLINIC | Age: 82
End: 2022-09-14

## 2022-09-14 VITALS
OXYGEN SATURATION: 96 % | SYSTOLIC BLOOD PRESSURE: 128 MMHG | BODY MASS INDEX: 39.97 KG/M2 | WEIGHT: 203.6 LBS | DIASTOLIC BLOOD PRESSURE: 84 MMHG | TEMPERATURE: 97.8 F | HEART RATE: 91 BPM | HEIGHT: 60 IN

## 2022-09-14 DIAGNOSIS — I10 PRIMARY HYPERTENSION: Primary | Chronic | ICD-10-CM

## 2022-09-14 DIAGNOSIS — E03.9 ACQUIRED HYPOTHYROIDISM: Chronic | ICD-10-CM

## 2022-09-14 DIAGNOSIS — E83.52 HYPERCALCEMIA: ICD-10-CM

## 2022-09-14 DIAGNOSIS — M54.50 CHRONIC RIGHT-SIDED LOW BACK PAIN WITHOUT SCIATICA: ICD-10-CM

## 2022-09-14 DIAGNOSIS — G89.29 CHRONIC RIGHT-SIDED LOW BACK PAIN WITHOUT SCIATICA: ICD-10-CM

## 2022-09-14 DIAGNOSIS — Z23 NEED FOR PNEUMOCOCCAL VACCINATION: Primary | ICD-10-CM

## 2022-09-14 DIAGNOSIS — E55.9 VITAMIN D DEFICIENCY: Chronic | ICD-10-CM

## 2022-09-14 PROCEDURE — 1170F FXNL STATUS ASSESSED: CPT | Performed by: NURSE PRACTITIONER

## 2022-09-14 PROCEDURE — G0439 PPPS, SUBSEQ VISIT: HCPCS | Performed by: NURSE PRACTITIONER

## 2022-09-14 PROCEDURE — 96160 PT-FOCUSED HLTH RISK ASSMT: CPT | Performed by: NURSE PRACTITIONER

## 2022-09-14 PROCEDURE — 90677 PCV20 VACCINE IM: CPT | Performed by: NURSE PRACTITIONER

## 2022-09-14 PROCEDURE — G0009 ADMIN PNEUMOCOCCAL VACCINE: HCPCS | Performed by: NURSE PRACTITIONER

## 2022-09-14 PROCEDURE — 1160F RVW MEDS BY RX/DR IN RCRD: CPT | Performed by: NURSE PRACTITIONER

## 2022-09-14 PROCEDURE — 1126F AMNT PAIN NOTED NONE PRSNT: CPT | Performed by: NURSE PRACTITIONER

## 2022-09-14 RX ORDER — BACLOFEN 20 MG/1
10 TABLET ORAL 3 TIMES DAILY PRN
Qty: 60 TABLET | Refills: 0 | Status: SHIPPED | OUTPATIENT
Start: 2022-09-14

## 2022-09-14 RX ORDER — GABAPENTIN 800 MG/1
800 TABLET ORAL DAILY
Qty: 90 TABLET | Refills: 1 | Status: SHIPPED | OUTPATIENT
Start: 2022-09-14 | End: 2023-04-05 | Stop reason: HOSPADM

## 2022-09-15 LAB
ALBUMIN SERPL-MCNC: 4.4 G/DL (ref 3.6–4.6)
ALBUMIN/GLOB SERPL: 1.5 {RATIO} (ref 1.2–2.2)
ALP SERPL-CCNC: 62 IU/L (ref 44–121)
ALT SERPL-CCNC: 11 IU/L (ref 0–32)
AST SERPL-CCNC: 13 IU/L (ref 0–40)
BILIRUB SERPL-MCNC: 0.3 MG/DL (ref 0–1.2)
BUN SERPL-MCNC: 15 MG/DL (ref 8–27)
BUN/CREAT SERPL: 14 (ref 12–28)
CALCIUM SERPL-MCNC: 10.1 MG/DL (ref 8.7–10.3)
CHLORIDE SERPL-SCNC: 101 MMOL/L (ref 96–106)
CO2 SERPL-SCNC: 22 MMOL/L (ref 20–29)
CREAT SERPL-MCNC: 1.04 MG/DL (ref 0.57–1)
EGFRCR-CYS SERPLBLD CKD-EPI 2021: 53 ML/MIN/1.73
GLOBULIN SER CALC-MCNC: 2.9 G/DL (ref 1.5–4.5)
GLUCOSE SERPL-MCNC: 109 MG/DL (ref 65–99)
INTACT PTH: NORMAL
POTASSIUM SERPL-SCNC: 4.5 MMOL/L (ref 3.5–5.2)
PROT SERPL-MCNC: 7.3 G/DL (ref 6–8.5)
PTH-INTACT SERPL-MCNC: 59 PG/ML (ref 15–65)
SODIUM SERPL-SCNC: 140 MMOL/L (ref 134–144)

## 2022-09-18 PROBLEM — M54.50 CHRONIC RIGHT-SIDED LOW BACK PAIN WITHOUT SCIATICA: Status: ACTIVE | Noted: 2022-09-18

## 2022-09-18 PROBLEM — G89.29 CHRONIC RIGHT-SIDED LOW BACK PAIN WITHOUT SCIATICA: Status: ACTIVE | Noted: 2022-09-18

## 2022-09-18 PROBLEM — M54.50 CHRONIC RIGHT-SIDED LOW BACK PAIN WITHOUT SCIATICA: Chronic | Status: ACTIVE | Noted: 2022-09-18

## 2022-09-18 PROBLEM — E83.52 HYPERCALCEMIA: Status: ACTIVE | Noted: 2022-09-18

## 2022-09-18 PROBLEM — G89.29 CHRONIC RIGHT-SIDED LOW BACK PAIN WITHOUT SCIATICA: Chronic | Status: ACTIVE | Noted: 2022-09-18

## 2022-09-18 NOTE — ASSESSMENT & PLAN NOTE
She takes gabapentin nightly for management of chronic low back pain radiating to her right leg.  She has seen Ortho in the past for evaluation.

## 2022-09-18 NOTE — PATIENT INSTRUCTIONS
Medicare Wellness  Personal Prevention Plan of Service     Date of Office Visit:    Encounter Provider:  MARTA Brink  Place of Service:  [facility]  Patient Name: Dee Watts  :  1940    As part of the Medicare Wellness portion of your visit today, we are providing you with this personalized preventive plan of services (PPPS). This plan is based upon recommendations of the [place] Preventive Services Task Force (USPSTF) and the Advisory Committee on Immunization Practices (ACIP).    This lists the preventive care services that should be considered, and provides dates of when you are due. Items listed as completed are up-to-date and do not require any further intervention.    Health Maintenance   Topic Date Due    ZOSTER VACCINE (2 of 3) 2013    COVID-19 Vaccine (4 - Booster for Pfizer series) 03/15/2022    ANNUAL WELLNESS VISIT  2022    INFLUENZA VACCINE  10/01/2022    LIPID PANEL  2023    DXA SCAN  2023    TDAP/TD VACCINES (3 - Td or Tdap) 2032    Pneumococcal Vaccine 65+  Completed       Orders Placed This Encounter   Procedures    PTH, Intact & Calcium     Order Specific Question:   Release to patient     Answer:   Routine Release     Order Specific Question:   [facility] Has the patient fasted?     Answer:   Yes    Comprehensive Metabolic Panel     Order Specific Question:   Release to patient     Answer:   Routine Release     Order Specific Question:   [facility] Has the patient fasted?     Answer:   Yes       Return in about 4 months (around 2023).

## 2022-09-18 NOTE — ASSESSMENT & PLAN NOTE
Hypertension is unchanged.  Continue current treatment regimen.  Dietary sodium restriction.  Blood pressure will be reassessed at the next regular appointment.  She will continue valsartan-HCTZ and verapamil which she is tolerating well.

## 2022-09-18 NOTE — PROGRESS NOTES
The ABCs of the Annual Wellness Visit  Subsequent Medicare Wellness Visit    Chief Complaint   Patient presents with   • Medicare Wellness-subsequent   • Back Pain   • Hypertension   • Eye Pain      Subjective    History of Present Illness:  Dee Watts is a 82 y.o. female who presents for a Subsequent Medicare Wellness Visit and to f/u on chronic low back pain, HTN and pain in right eye.    She has seen her ophthalmologist (Dr. Jones) due to pain in her right eye, denies any abnormalities on exam.  She has lost 9# since 12/2021 which she attributes to decreased appetite.  Denies abdominal pain.  She continues to experience difficulty falling asleep and staying asleep despite many over-the-counter supplements for insomnia.  She continues to complain of low back pain which radiates into her right hip and leg.  She does take gabapentin nightly for improvement in pain as well as weight.    The following portions of the patient's history were reviewed and   updated as appropriate: allergies, current medications, past family history, past medical history, past social history, past surgical history and problem list.    Compared to one year ago, the patient feels her physical   health is the same.    Compared to one year ago, the patient feels her mental   health is the same.    Recent Hospitalizations:  She was not admitted to the hospital during the last year.       Current Medical Providers:  Patient Care Team:  Susanne Sharma APRN as PCP - General (Internal Medicine)  Pedro Sy MD as Surgeon (Orthopedic Surgery)  Keith Dorsey MD as Surgeon (Orthopedic Surgery)  Kira Beal MD (Dermatology)  Jadon Jones MD as Consulting Physician (Ophthalmology)  Edgar Tello MD as Consulting Physician (Otolaryngology)    Outpatient Medications Prior to Visit   Medication Sig Dispense Refill   • albuterol sulfate  (90 Base) MCG/ACT inhaler Inhale 2 puffs Every 4 (Four) Hours As Needed for  Wheezing. 18 g 6   • escitalopram (LEXAPRO) 20 MG tablet TAKE 1 TABLET BY MOUTH DAILY 90 tablet 1   • fexofenadine (ALLEGRA) 180 MG tablet Take 1 tablet by mouth Daily. 90 tablet 1   • levothyroxine (SYNTHROID, LEVOTHROID) 100 MCG tablet Take 1 tablet by mouth Daily. 90 tablet 3   • levothyroxine (SYNTHROID, LEVOTHROID) 88 MCG tablet TAKE 1 TABLET BY MOUTH EVERY OTHER DAY 45 tablet 0   • nystatin-triamcinolone (MYCOLOG II) 381275-6.1 UNIT/GM-% cream Apply  topically to the appropriate area as directed 2 (Two) Times a Day. 60 g 9   • raloxifene (EVISTA) 60 MG tablet TAKE 1 TABLET BY MOUTH DAILY 90 tablet 3   • valsartan-hydrochlorothiazide (DIOVAN-HCT) 160-25 MG per tablet TAKE 1 TABLET BY MOUTH DAILY 90 tablet 3   • verapamil SR (CALAN-SR) 240 MG CR tablet TAKE 1 TABLET BY MOUTH DAILY 90 tablet 1   • gabapentin (NEURONTIN) 800 MG tablet TAKE 1 TABLET BY MOUTH DAILY 90 tablet 0     No facility-administered medications prior to visit.       No opioid medication identified on active medication list. I have reviewed chart for other potential  high risk medication/s and harmful drug interactions in the elderly.          Aspirin is not on active medication list.  Aspirin use is not indicated based on review of current medical condition/s. Risk of harm outweighs potential benefits.  .    Patient Active Problem List   Diagnosis   • DDD (degenerative disc disease), lumbar   • Osteoarthritis of hip   • Lumbosacral neuritis   • Lumbar canal stenosis   • Hypothyroidism   • Hypertension   • Mood disorder (HCC)   • Vitamin D deficiency   • IBS (irritable bowel syndrome)   • Osteoporosis   • Migraines   • Chronic breast pain   • Status post total hip replacement, right   • Mixed stress and urge urinary incontinence   • Allergic rhinitis   • Episodic paroxysmal hemicrania, not intractable   • Vertigo   • Obesity, morbid (HCC)   • Subdural hematoma (HCC)   • Hypercalcemia   • Chronic right-sided low back pain without sciatica  "    Advance Care Planning  Advance Directive is not on file.  ACP discussion was held with the patient during this visit. Patient has an advance directive (not in EMR), copy requested.    Review of Systems   Constitutional: Positive for fatigue. Negative for fever.   HENT: Positive for congestion and postnasal drip.    Respiratory: Negative for choking, chest tightness and shortness of breath.    Cardiovascular: Negative for chest pain, palpitations and leg swelling.   Gastrointestinal: Negative for abdominal pain.   Musculoskeletal: Positive for arthralgias and back pain.   Psychiatric/Behavioral: Positive for sleep disturbance.        Objective    Vitals:    09/14/22 0942   BP: 128/84   BP Location: Left arm   Patient Position: Sitting   Cuff Size: Large Adult   Pulse: 91   Temp: 97.8 °F (36.6 °C)   TempSrc: Temporal   SpO2: 96%   Weight: 92.4 kg (203 lb 9.6 oz)   Height: 152.4 cm (60\")   PainSc: 0-No pain     Estimated body mass index is 39.76 kg/m² as calculated from the following:    Height as of this encounter: 152.4 cm (60\").    Weight as of this encounter: 92.4 kg (203 lb 9.6 oz).    Class 2 Severe Obesity (BMI >=35 and <=39.9). Obesity-related health conditions include the following: hypertension and osteoarthritis. Obesity is unchanged. BMI is is above average; BMI management plan is completed. We discussed portion control and increasing exercise.      Does the patient have evidence of cognitive impairment? No    Physical Exam  Constitutional:       Appearance: She is well-developed. She is not ill-appearing.   HENT:      Head: Normocephalic.      Right Ear: Hearing, tympanic membrane and external ear normal.      Left Ear: Hearing, tympanic membrane and external ear normal.      Nose: Nose normal. No nasal deformity, mucosal edema or rhinorrhea.      Right Sinus: No maxillary sinus tenderness or frontal sinus tenderness.      Left Sinus: No maxillary sinus tenderness or frontal sinus tenderness.      " Mouth/Throat:      Dentition: Normal dentition.   Eyes:      General: Lids are normal.         Right eye: No discharge.         Left eye: No discharge.      Conjunctiva/sclera: Conjunctivae normal.      Right eye: No exudate.     Left eye: No exudate.  Neck:      Thyroid: No thyroid mass or thyromegaly.      Vascular: No carotid bruit.      Trachea: Trachea normal.   Cardiovascular:      Rate and Rhythm: Regular rhythm.      Pulses: Normal pulses.      Heart sounds: Normal heart sounds. No murmur heard.  Pulmonary:      Effort: No respiratory distress.      Breath sounds: Normal breath sounds. No decreased breath sounds, wheezing, rhonchi or rales.   Abdominal:      General: Bowel sounds are normal.      Palpations: Abdomen is soft.      Tenderness: There is no abdominal tenderness.   Musculoskeletal:      Cervical back: Normal range of motion. No edema.   Lymphadenopathy:      Head:      Right side of head: No submental, submandibular, tonsillar, preauricular, posterior auricular or occipital adenopathy.      Left side of head: No submental, submandibular, tonsillar, preauricular, posterior auricular or occipital adenopathy.   Skin:     General: Skin is warm and dry.      Nails: There is no clubbing.   Neurological:      Mental Status: She is alert.   Psychiatric:         Behavior: Behavior is cooperative.                 HEALTH RISK ASSESSMENT    Smoking Status:  Social History     Tobacco Use   Smoking Status Former Smoker   • Quit date: 1990   • Years since quittin.7   Smokeless Tobacco Never Used     Alcohol Consumption:  Social History     Substance and Sexual Activity   Alcohol Use Yes    Comment: 1 DRINK PER WEEK     Fall Risk Screen:    STEADI Fall Risk Assessment was completed, and patient is at HIGH risk for falls. Assessment completed on:2022    Depression Screening:  PHQ-2/PHQ-9 Depression Screening 2022   Retired PHQ-9 Total Score -   Retired Total Score -   Little Interest or  Pleasure in Doing Things 0-->not at all   Feeling Down, Depressed or Hopeless 1-->several days   PHQ-9: Brief Depression Severity Measure Score 1       Health Habits and Functional and Cognitive Screening:  Functional & Cognitive Status 9/14/2022   Do you have difficulty preparing food and eating? No   Do you have difficulty bathing yourself, getting dressed or grooming yourself? No   Do you have difficulty using the toilet? No   Do you have difficulty moving around from place to place? No   Do you have trouble with steps or getting out of a bed or a chair? No   Current Diet Well Balanced Diet   Dental Exam Up to date        Dental Exam Comment -   Eye Exam Up to date   Exercise (times per week) 3 times per week   Current Exercises Include Walking   Current Exercise Activities Include -   Do you need help using the phone?  No   Are you deaf or do you have serious difficulty hearing?  No   Do you need help with transportation? No   Do you need help shopping? No   Do you need help preparing meals?  No   Do you need help with housework?  No   Do you need help with laundry? No   Do you need help taking your medications? No   Do you need help managing money? No   Do you ever drive or ride in a car without wearing a seat belt? No   Have you felt unusual stress, anger or loneliness in the last month? Yes   Who do you live with? Community   If you need help, do you have trouble finding someone available to you? No   Have you been bothered in the last four weeks by sexual problems? No   Do you have difficulty concentrating, remembering or making decisions? Yes       Age-appropriate Screening Schedule:  Refer to the list below for future screening recommendations based on patient's age, sex and/or medical conditions. Orders for these recommended tests are listed in the plan section. The patient has been provided with a written plan.    Health Maintenance   Topic Date Due   • ZOSTER VACCINE (2 of 3) 08/22/2013   • INFLUENZA  VACCINE  10/01/2022   • LIPID PANEL  05/06/2023   • DXA SCAN  07/20/2023   • TDAP/TD VACCINES (3 - Td or Tdap) 01/16/2032              Assessment & Plan   CMS Preventative Services Quick Reference  Risk Factors Identified During Encounter  Chronic Pain   Fall Risk-High or Moderate  Immunizations Discussed/Encouraged (specific Immunizations; Shingrix and COVID19  The above risks/problems have been discussed with the patient.  Follow up actions/plans if indicated are seen below in the Assessment/Plan Section.  Pertinent information has been shared with the patient in the After Visit Summary.    Diagnoses and all orders for this visit:    1. Primary hypertension (Primary)  Assessment & Plan:  Hypertension is unchanged.  Continue current treatment regimen.  Dietary sodium restriction.  Blood pressure will be reassessed at the next regular appointment.  She will continue valsartan-HCTZ and verapamil which she is tolerating well.      2. Chronic right-sided low back pain without sciatica  Assessment & Plan:  She takes gabapentin nightly for management of chronic low back pain radiating to her right leg.  She has seen Ortho in the past for evaluation.    Orders:  -     gabapentin (NEURONTIN) 800 MG tablet; Take 1 tablet by mouth Daily.  Dispense: 90 tablet; Refill: 1  -     baclofen (LIORESAL) 20 MG tablet; Take 0.5 tablets by mouth 3 (Three) Times a Day As Needed for Muscle Spasms.  Dispense: 60 tablet; Refill: 0    3. Vitamin D deficiency  Assessment & Plan:  She is currently managed on a daily vitamin D supplement.      4. Acquired hypothyroidism  Assessment & Plan:  TSH 2.130 with recent labs, continue current Synthroid dose.      5. Hypercalcemia  Assessment & Plan:  Recheck calcium today along with PTH.    Orders:  -     PTH, Intact & Calcium  -     Comprehensive Metabolic Panel    DANA query complete. Treatment plan to include limited course of prescribed  controlled substance. Risks including addiction, benefits,  and alternatives presented to patient.     Follow Up:   Return in about 4 months (around 1/14/2023).     An After Visit Summary and PPPS were made available to the patient.

## 2022-10-08 DIAGNOSIS — E03.9 ACQUIRED HYPOTHYROIDISM: ICD-10-CM

## 2022-10-10 RX ORDER — LEVOTHYROXINE SODIUM 0.1 MG/1
100 TABLET ORAL DAILY
Qty: 90 TABLET | Refills: 3 | Status: SHIPPED | OUTPATIENT
Start: 2022-10-10

## 2022-11-23 RX ORDER — FEXOFENADINE HCL 180 MG/1
180 TABLET ORAL DAILY
Qty: 90 TABLET | Refills: 1 | Status: SHIPPED | OUTPATIENT
Start: 2022-11-23

## 2023-01-23 DIAGNOSIS — I10 ESSENTIAL HYPERTENSION: ICD-10-CM

## 2023-01-23 RX ORDER — VALSARTAN AND HYDROCHLOROTHIAZIDE 160; 25 MG/1; MG/1
1 TABLET ORAL DAILY
Qty: 90 TABLET | Refills: 3 | Status: SHIPPED | OUTPATIENT
Start: 2023-01-23 | End: 2023-04-05 | Stop reason: HOSPADM

## 2023-01-23 NOTE — TELEPHONE ENCOUNTER
Caller: Dee Watts    Relationship: Self    Best call back number: [phone]    Requested Prescriptions:   Requested Prescriptions     Pending Prescriptions Disp Refills   • valsartan-hydrochlorothiazide (DIOVAN-HCT) 160-25 MG per tablet 90 tablet 3     Sig: Take 1 tablet by mouth Daily.        Pharmacy where request should be sent: [id number] [place] [phone] [place] [phone] [place]     Does the patient have less than a 3 day supply:  [x] Yes  [] No    Would you like a call back once the refill request has been completed: [x] Yes [] No    If the office needs to give you a call back, can they leave a voicemail: [x] Yes [] No    Lizzy Diehl Rep   01/23/23 11:33 EST

## 2023-02-20 ENCOUNTER — OFFICE VISIT (OUTPATIENT)
Dept: INTERNAL MEDICINE | Facility: CLINIC | Age: 83
End: 2023-02-20
Payer: MEDICARE

## 2023-02-20 VITALS
WEIGHT: 205 LBS | TEMPERATURE: 98.2 F | DIASTOLIC BLOOD PRESSURE: 82 MMHG | SYSTOLIC BLOOD PRESSURE: 120 MMHG | BODY MASS INDEX: 40.25 KG/M2 | HEIGHT: 60 IN

## 2023-02-20 DIAGNOSIS — R51.9 RIGHT-SIDED HEADACHE: ICD-10-CM

## 2023-02-20 DIAGNOSIS — L21.9 SEBORRHEIC DERMATITIS: Primary | ICD-10-CM

## 2023-02-20 DIAGNOSIS — I10 PRIMARY HYPERTENSION: Chronic | ICD-10-CM

## 2023-02-20 DIAGNOSIS — E66.01 OBESITY, MORBID: ICD-10-CM

## 2023-02-20 DIAGNOSIS — E03.9 ACQUIRED HYPOTHYROIDISM: Chronic | ICD-10-CM

## 2023-02-20 DIAGNOSIS — F39 MOOD DISORDER: ICD-10-CM

## 2023-02-20 DIAGNOSIS — E55.9 VITAMIN D DEFICIENCY: Chronic | ICD-10-CM

## 2023-02-20 PROCEDURE — 99214 OFFICE O/P EST MOD 30 MIN: CPT | Performed by: NURSE PRACTITIONER

## 2023-02-20 RX ORDER — LEVOTHYROXINE SODIUM 88 UG/1
88 TABLET ORAL EVERY OTHER DAY
Qty: 45 TABLET | Refills: 1 | Status: SHIPPED | OUTPATIENT
Start: 2023-02-20

## 2023-02-20 RX ORDER — KETOCONAZOLE 20 MG/G
1 CREAM TOPICAL DAILY
Qty: 60 G | Refills: 0 | Status: SHIPPED | OUTPATIENT
Start: 2023-02-20 | End: 2023-04-05 | Stop reason: HOSPADM

## 2023-02-20 RX ORDER — KETOCONAZOLE 20 MG/ML
SHAMPOO TOPICAL
Qty: 100 ML | Refills: 0 | Status: SHIPPED | OUTPATIENT
Start: 2023-02-20 | End: 2023-04-05 | Stop reason: HOSPADM

## 2023-02-21 LAB
25(OH)D3+25(OH)D2 SERPL-MCNC: 29.2 NG/ML (ref 30–100)
ALBUMIN SERPL-MCNC: 4.1 G/DL (ref 3.6–4.6)
ALBUMIN/GLOB SERPL: 1.5 {RATIO} (ref 1.2–2.2)
ALP SERPL-CCNC: 58 IU/L (ref 44–121)
ALT SERPL-CCNC: 13 IU/L (ref 0–32)
AST SERPL-CCNC: 15 IU/L (ref 0–40)
BASOPHILS # BLD AUTO: 0.1 X10E3/UL (ref 0–0.2)
BASOPHILS NFR BLD AUTO: 1 %
BILIRUB SERPL-MCNC: <0.2 MG/DL (ref 0–1.2)
BUN SERPL-MCNC: 19 MG/DL (ref 8–27)
BUN/CREAT SERPL: 17 (ref 12–28)
CALCIUM SERPL-MCNC: 10.4 MG/DL (ref 8.7–10.3)
CHLORIDE SERPL-SCNC: 109 MMOL/L (ref 96–106)
CHOLEST SERPL-MCNC: 199 MG/DL (ref 100–199)
CO2 SERPL-SCNC: 22 MMOL/L (ref 20–29)
CREAT SERPL-MCNC: 1.1 MG/DL (ref 0.57–1)
EGFRCR SERPLBLD CKD-EPI 2021: 50 ML/MIN/1.73
EOSINOPHIL # BLD AUTO: 0.3 X10E3/UL (ref 0–0.4)
EOSINOPHIL NFR BLD AUTO: 4 %
ERYTHROCYTE [DISTWIDTH] IN BLOOD BY AUTOMATED COUNT: 12.7 % (ref 11.7–15.4)
GLOBULIN SER CALC-MCNC: 2.7 G/DL (ref 1.5–4.5)
GLUCOSE SERPL-MCNC: 111 MG/DL (ref 70–99)
HCT VFR BLD AUTO: 41.1 % (ref 34–46.6)
HDLC SERPL-MCNC: 39 MG/DL
HGB BLD-MCNC: 13.5 G/DL (ref 11.1–15.9)
IMM GRANULOCYTES # BLD AUTO: 0.1 X10E3/UL (ref 0–0.1)
IMM GRANULOCYTES NFR BLD AUTO: 1 %
LDLC SERPL CALC-MCNC: 137 MG/DL (ref 0–99)
LYMPHOCYTES # BLD AUTO: 1.4 X10E3/UL (ref 0.7–3.1)
LYMPHOCYTES NFR BLD AUTO: 18 %
MCH RBC QN AUTO: 29.9 PG (ref 26.6–33)
MCHC RBC AUTO-ENTMCNC: 32.8 G/DL (ref 31.5–35.7)
MCV RBC AUTO: 91 FL (ref 79–97)
MONOCYTES # BLD AUTO: 0.6 X10E3/UL (ref 0.1–0.9)
MONOCYTES NFR BLD AUTO: 8 %
NEUTROPHILS # BLD AUTO: 5.1 X10E3/UL (ref 1.4–7)
NEUTROPHILS NFR BLD AUTO: 68 %
PLATELET # BLD AUTO: 243 X10E3/UL (ref 150–450)
POTASSIUM SERPL-SCNC: 4.5 MMOL/L (ref 3.5–5.2)
PROT SERPL-MCNC: 6.8 G/DL (ref 6–8.5)
RBC # BLD AUTO: 4.51 X10E6/UL (ref 3.77–5.28)
SODIUM SERPL-SCNC: 145 MMOL/L (ref 134–144)
TRIGL SERPL-MCNC: 125 MG/DL (ref 0–149)
TSH SERPL DL<=0.005 MIU/L-ACNC: 4.03 UIU/ML (ref 0.45–4.5)
VLDLC SERPL CALC-MCNC: 23 MG/DL (ref 5–40)
WBC # BLD AUTO: 7.5 X10E3/UL (ref 3.4–10.8)

## 2023-02-26 PROBLEM — R51.9 RIGHT-SIDED HEADACHE: Status: ACTIVE | Noted: 2023-02-26

## 2023-02-26 NOTE — ASSESSMENT & PLAN NOTE
Patient's (Body mass index is 40.04 kg/m².) indicates that they are  with health conditions that include hypertension, dyslipidemias and osteoarthritis . Weight is unchanged. BMI  is above average; BMI management plan is completed. We discussed portion control and increasing exercise.

## 2023-02-26 NOTE — ASSESSMENT & PLAN NOTE
She c/o persistent right-sided headache with right eye pain, followed by opth (Dr. Jones) without vision changes, consider MRI to further evaluate.

## 2023-02-26 NOTE — ASSESSMENT & PLAN NOTE
She is on Synthroid 88 mcg alternating with 100 mcg for replacement.   Lab Results   Component Value Date    TSH 4.030 02/20/2023

## 2023-02-26 NOTE — ASSESSMENT & PLAN NOTE
BP is well-controlled on valsartan-HCTZ and verapamil which she will continue along with a low sodium diet.

## 2023-02-26 NOTE — PROGRESS NOTES
"Chief Complaint  Follow-up, Hypertension, Fall (A month ago ), Irritable Bowel Syndrome, Hypothyroidism, and Rash    Subjective        Dee Mac presents to [facility] for f/u regarding HTN, IBS and hypothyroidism. She c/o irritation of her scalp.    History of Present Illness  She continues to c/o pain in her right eye which she states has been presents for many years, states pain in right eye has increased in severity and frequency. Denies vision changes. She is followed by opth, Dr. Jones. No paresthesias.  She c/o left leg pain after a fall dasia 1 month ago, fell into a nightstand.        Objective   Vital Signs:  /82 (BP Location: Right arm, Patient Position: Sitting, Cuff Size: Adult)   Temp 98.2 °F (36.8 °C) (Temporal)   Ht 152.4 cm (60\")   Wt 93 kg (205 lb)   BMI 40.04 kg/m²   Estimated body mass index is 40.04 kg/m² as calculated from the following:    Height as of this encounter: 152.4 cm (60\").    Weight as of this encounter: 93 kg (205 lb).             Physical Exam  Constitutional:       Appearance: She is well-developed. She is not ill-appearing.   HENT:      Head: Normocephalic.      Right Ear: Hearing, tympanic membrane and external ear normal.      Left Ear: Hearing, tympanic membrane and external ear normal.      Nose: Nose normal. No nasal deformity, mucosal edema or rhinorrhea.      Right Sinus: No maxillary sinus tenderness or frontal sinus tenderness.      Left Sinus: No maxillary sinus tenderness or frontal sinus tenderness.      Mouth/Throat:      Dentition: Normal dentition.   Eyes:      General: Lids are normal.         Right eye: No discharge.         Left eye: No discharge.      Conjunctiva/sclera: Conjunctivae normal.      Right eye: No exudate.     Left eye: No exudate.  Neck:      Thyroid: No thyroid mass or thyromegaly.      Vascular: No carotid bruit.      Trachea: Trachea normal.   Cardiovascular:      Rate and Rhythm: Regular rhythm.    "   Pulses: Normal pulses.      Heart sounds: Normal heart sounds. No murmur heard.  Pulmonary:      Effort: No respiratory distress.      Breath sounds: Normal breath sounds. No decreased breath sounds, wheezing, rhonchi or rales.   Abdominal:      General: Bowel sounds are normal.      Palpations: Abdomen is soft.      Tenderness: There is no abdominal tenderness.   Musculoskeletal:      Cervical back: Normal range of motion. No edema.   Lymphadenopathy:      Head:      Right side of head: No submental, submandibular, tonsillar, preauricular, posterior auricular or occipital adenopathy.      Left side of head: No submental, submandibular, tonsillar, preauricular, posterior auricular or occipital adenopathy.   Skin:     General: Skin is warm and dry.      Nails: There is no clubbing.      Comments: Dry, scaly skin on scalp and forehead   Neurological:      Mental Status: She is alert.   Psychiatric:         Behavior: Behavior is cooperative.        Result Review :  The following data was reviewed by: MARTA Brink on 02/20/2023:  Common labs    Common Labs 5/6/22 5/6/22 5/6/22 9/14/22 2/20/23 2/20/23 2/20/23    1035 1035 1035  1048 1048 1048   Glucose  116 (A)  109 (A)  111 (A)    BUN  20  15  19    Creatinine  1.12 (A)  1.04 (A)  1.10 (A)    Sodium  141  140  145 (A)    Potassium  4.4  4.5  4.5    Chloride  104  101  109 (A)    Calcium  11.1 (A)  10.1  10.4 (A)    Total Protein  7.2  7.3  6.8    Albumin  4.4  4.4  4.1    Total Bilirubin  0.2  0.3  <0.2    Alkaline Phosphatase  66  62  58    AST (SGOT)  15  13  15    ALT (SGPT)  13  11  13    WBC 9.0    7.5     Hemoglobin 15.1    13.5     Hematocrit 45.5    41.1     Platelets 245    243     Total Cholesterol   184    199   Triglycerides   125    125   HDL Cholesterol   40    39 (A)   LDL Cholesterol    121 (A)    137 (A)   (A) Abnormal value                         Assessment and Plan   Diagnoses and all orders for this visit:    1. Seborrheic dermatitis  (Primary)  Comments:  will treat with topical antifungals, consider further evaluaiton (derm) if sx persist  Orders:  -     ketoconazole (NIZORAL) 2 % cream; Apply 1 application topically to the appropriate area as directed Daily.  Dispense: 60 g; Refill: 0  -     ketoconazole (NIZORAL) 2 % shampoo; Apply to scalp 5 minutes before rinsing, apply 2-3 times per week  Dispense: 100 mL; Refill: 0    2. Primary hypertension  Assessment & Plan:  BP is well-controlled on valsartan-HCTZ and verapamil which she will continue along with a low sodium diet.    Orders:  -     CBC & Differential  -     Comprehensive Metabolic Panel  -     Lipid Panel    3. Acquired hypothyroidism  Assessment & Plan:  She is on Synthroid 88 mcg alternating with 100 mcg for replacement.   Lab Results   Component Value Date    TSH 4.030 02/20/2023       Orders:  -     TSH  -     levothyroxine (SYNTHROID, LEVOTHROID) 88 MCG tablet; Take 1 tablet by mouth Every Other Day.  Dispense: 45 tablet; Refill: 1    4. Vitamin D deficiency  Assessment & Plan:  She is currently managed on Vitamin D daily-recheck level.    Orders:  -     Vitamin D,25-Hydroxy    5. Right-sided headache  Assessment & Plan:  She c/o persistent right-sided headache with right eye pain, followed by opth (Dr. Jones) without vision changes, consider MRI to further evaluate.      6. Mood disorder (HCC)  Assessment & Plan:  Sx managed with Lexapro which she is toelraring well.      7. Obesity, morbid (HCC)  Assessment & Plan:  Patient's (Body mass index is 40.04 kg/m².) indicates that they are  with health conditions that include hypertension, dyslipidemias and osteoarthritis . Weight is unchanged. BMI  is above average; BMI management plan is completed. We discussed portion control and increasing exercise.            Follow Up   Return in about 6 months (around 8/20/2023).  Patient was given instructions and counseling regarding her condition or for health maintenance advice. Please see  specific information pulled into the AVS if appropriate.

## 2023-03-23 ENCOUNTER — TELEPHONE (OUTPATIENT)
Dept: INTERNAL MEDICINE | Facility: CLINIC | Age: 83
End: 2023-03-23
Payer: MEDICARE

## 2023-03-23 NOTE — TELEPHONE ENCOUNTER
Caller: Dee Watts    Relationship: Self    Best call back number:[phone] (Mobile)    What medication are you requesting: SOMETHING FOR POSSIBLE BLADDER INFECTION    What are your current symptoms: BURNING WHEN URINATING, FREQUENT URINATION, LITTLE URINE WHEN GOING TO BATHROOM    How long have you been experiencing symptoms: 3 DAYS BUT WORSENED TODAY    Have you had these symptoms before:    [x] Yes  [] No    Have you been treated for these symptoms before:   [x] Yes  [] No    If a prescription is needed, what is your preferred pharmacy and phone number: [id number] [address] AT [facility] - [phone] [facility] [phone]      Additional notes: PATIENT STATES SHE IS UNABLE TO COME INTO THE OFFICE AND IS ASKING FOR THIS TO BE CALLED INTO THE PHARMACY ASAP, PLEASE ADVISE PATIENT IF THIS CAN BE CALLED INTO THE PHARMACY ASAP

## 2023-03-24 NOTE — TELEPHONE ENCOUNTER
Patient would need to be seen prior to prescribing medication. Please setup appointment at earliest convenience or advise urgent care.

## 2023-03-24 NOTE — TELEPHONE ENCOUNTER
D/w patient, she stated that she is drinking water and is now feeling better.   Advised that if her symptoms return or new symptoms develop to go to [facility] over the weekend.   She voiced understanding and was agreeable

## 2023-04-01 ENCOUNTER — HOSPITAL ENCOUNTER (OUTPATIENT)
Facility: HOSPITAL | Age: 83
Setting detail: OBSERVATION
Discharge: NURSING, REHAB OR LONG-TERM CARE | End: 2023-04-05
Attending: EMERGENCY MEDICINE | Admitting: HOSPITALIST
Payer: MEDICARE

## 2023-04-01 ENCOUNTER — APPOINTMENT (OUTPATIENT)
Dept: CT IMAGING | Facility: HOSPITAL | Age: 83
End: 2023-04-01
Payer: MEDICARE

## 2023-04-01 DIAGNOSIS — R53.1 WEAKNESS: ICD-10-CM

## 2023-04-01 DIAGNOSIS — R30.0 DYSURIA: ICD-10-CM

## 2023-04-01 DIAGNOSIS — R41.82 ALTERED MENTAL STATUS, UNSPECIFIED ALTERED MENTAL STATUS TYPE: Primary | ICD-10-CM

## 2023-04-01 DIAGNOSIS — R11.2 NAUSEA AND VOMITING, UNSPECIFIED VOMITING TYPE: ICD-10-CM

## 2023-04-01 DIAGNOSIS — I10 HYPERTENSION, UNSPECIFIED TYPE: ICD-10-CM

## 2023-04-01 PROBLEM — N39.0 UTI (URINARY TRACT INFECTION): Status: ACTIVE | Noted: 2023-04-01

## 2023-04-01 LAB
ALBUMIN SERPL-MCNC: 4.3 G/DL (ref 3.5–5.2)
ALBUMIN/GLOB SERPL: 1.5 G/DL
ALP SERPL-CCNC: 56 U/L (ref 39–117)
ALT SERPL W P-5'-P-CCNC: 12 U/L (ref 1–33)
ANION GAP SERPL CALCULATED.3IONS-SCNC: 11.3 MMOL/L (ref 5–15)
AST SERPL-CCNC: 12 U/L (ref 1–32)
B PARAPERT DNA SPEC QL NAA+PROBE: NOT DETECTED
B PERT DNA SPEC QL NAA+PROBE: NOT DETECTED
BACTERIA UR QL AUTO: ABNORMAL /HPF
BASOPHILS # BLD AUTO: 0.05 10*3/MM3 (ref 0–0.2)
BASOPHILS NFR BLD AUTO: 0.4 % (ref 0–1.5)
BILIRUB SERPL-MCNC: 0.4 MG/DL (ref 0–1.2)
BILIRUB UR QL STRIP: NEGATIVE
BUN SERPL-MCNC: 23 MG/DL (ref 8–23)
BUN/CREAT SERPL: 21.1 (ref 7–25)
C PNEUM DNA NPH QL NAA+NON-PROBE: NOT DETECTED
CALCIUM SPEC-SCNC: 11 MG/DL (ref 8.6–10.5)
CHLORIDE SERPL-SCNC: 106 MMOL/L (ref 98–107)
CLARITY UR: CLEAR
CO2 SERPL-SCNC: 25.7 MMOL/L (ref 22–29)
COLOR UR: YELLOW
CREAT SERPL-MCNC: 1.09 MG/DL (ref 0.57–1)
DEPRECATED RDW RBC AUTO: 45.6 FL (ref 37–54)
EGFRCR SERPLBLD CKD-EPI 2021: 50.2 ML/MIN/1.73
EOSINOPHIL # BLD AUTO: 0.14 10*3/MM3 (ref 0–0.4)
EOSINOPHIL NFR BLD AUTO: 1.2 % (ref 0.3–6.2)
ERYTHROCYTE [DISTWIDTH] IN BLOOD BY AUTOMATED COUNT: 13.3 % (ref 12.3–15.4)
FLUAV SUBTYP SPEC NAA+PROBE: NOT DETECTED
FLUBV RNA ISLT QL NAA+PROBE: NOT DETECTED
GEN 5 2HR TROPONIN T REFLEX: 12 NG/L
GLOBULIN UR ELPH-MCNC: 2.9 GM/DL
GLUCOSE BLDC GLUCOMTR-MCNC: 92 MG/DL (ref 70–130)
GLUCOSE SERPL-MCNC: 101 MG/DL (ref 65–99)
GLUCOSE UR STRIP-MCNC: NEGATIVE MG/DL
HADV DNA SPEC NAA+PROBE: NOT DETECTED
HCOV 229E RNA SPEC QL NAA+PROBE: NOT DETECTED
HCOV HKU1 RNA SPEC QL NAA+PROBE: NOT DETECTED
HCOV NL63 RNA SPEC QL NAA+PROBE: NOT DETECTED
HCOV OC43 RNA SPEC QL NAA+PROBE: NOT DETECTED
HCT VFR BLD AUTO: 42.9 % (ref 34–46.6)
HGB BLD-MCNC: 13.7 G/DL (ref 12–15.9)
HGB UR QL STRIP.AUTO: NEGATIVE
HMPV RNA NPH QL NAA+NON-PROBE: NOT DETECTED
HOLD SPECIMEN: NORMAL
HOLD SPECIMEN: NORMAL
HPIV1 RNA ISLT QL NAA+PROBE: NOT DETECTED
HPIV2 RNA SPEC QL NAA+PROBE: NOT DETECTED
HPIV3 RNA NPH QL NAA+PROBE: NOT DETECTED
HPIV4 P GENE NPH QL NAA+PROBE: NOT DETECTED
HYALINE CASTS UR QL AUTO: ABNORMAL /LPF
IMM GRANULOCYTES # BLD AUTO: 0.06 10*3/MM3 (ref 0–0.05)
IMM GRANULOCYTES NFR BLD AUTO: 0.5 % (ref 0–0.5)
KETONES UR QL STRIP: NEGATIVE
LEUKOCYTE ESTERASE UR QL STRIP.AUTO: ABNORMAL
LYMPHOCYTES # BLD AUTO: 1.91 10*3/MM3 (ref 0.7–3.1)
LYMPHOCYTES NFR BLD AUTO: 16.6 % (ref 19.6–45.3)
M PNEUMO IGG SER IA-ACNC: NOT DETECTED
MCH RBC QN AUTO: 29.4 PG (ref 26.6–33)
MCHC RBC AUTO-ENTMCNC: 31.9 G/DL (ref 31.5–35.7)
MCV RBC AUTO: 92.1 FL (ref 79–97)
MONOCYTES # BLD AUTO: 0.98 10*3/MM3 (ref 0.1–0.9)
MONOCYTES NFR BLD AUTO: 8.5 % (ref 5–12)
NEUTROPHILS NFR BLD AUTO: 72.8 % (ref 42.7–76)
NEUTROPHILS NFR BLD AUTO: 8.37 10*3/MM3 (ref 1.7–7)
NITRITE UR QL STRIP: NEGATIVE
NRBC BLD AUTO-RTO: 0 /100 WBC (ref 0–0.2)
NT-PROBNP SERPL-MCNC: 207 PG/ML (ref 0–1800)
PH UR STRIP.AUTO: 6 [PH] (ref 5–8)
PLATELET # BLD AUTO: 247 10*3/MM3 (ref 140–450)
PMV BLD AUTO: 11.4 FL (ref 6–12)
POTASSIUM SERPL-SCNC: 3.7 MMOL/L (ref 3.5–5.2)
PROCALCITONIN SERPL-MCNC: 0.05 NG/ML (ref 0–0.25)
PROT SERPL-MCNC: 7.2 G/DL (ref 6–8.5)
PROT UR QL STRIP: ABNORMAL
RBC # BLD AUTO: 4.66 10*6/MM3 (ref 3.77–5.28)
RBC # UR STRIP: ABNORMAL /HPF
REF LAB TEST METHOD: ABNORMAL
RHINOVIRUS RNA SPEC NAA+PROBE: NOT DETECTED
RSV RNA NPH QL NAA+NON-PROBE: NOT DETECTED
SARS-COV-2 RNA NPH QL NAA+NON-PROBE: NOT DETECTED
SODIUM SERPL-SCNC: 143 MMOL/L (ref 136–145)
SP GR UR STRIP: 1.03 (ref 1–1.03)
SQUAMOUS #/AREA URNS HPF: ABNORMAL /HPF
TROPONIN T DELTA: 0 NG/L
TROPONIN T SERPL HS-MCNC: 12 NG/L
UROBILINOGEN UR QL STRIP: ABNORMAL
WBC # UR STRIP: ABNORMAL /HPF
WBC NRBC COR # BLD: 11.51 10*3/MM3 (ref 3.4–10.8)
WHOLE BLOOD HOLD COAG: NORMAL
WHOLE BLOOD HOLD SPECIMEN: NORMAL

## 2023-04-01 PROCEDURE — 96375 TX/PRO/DX INJ NEW DRUG ADDON: CPT

## 2023-04-01 PROCEDURE — 81001 URINALYSIS AUTO W/SCOPE: CPT | Performed by: EMERGENCY MEDICINE

## 2023-04-01 PROCEDURE — 96365 THER/PROPH/DIAG IV INF INIT: CPT

## 2023-04-01 PROCEDURE — 99285 EMERGENCY DEPT VISIT HI MDM: CPT

## 2023-04-01 PROCEDURE — G0378 HOSPITAL OBSERVATION PER HR: HCPCS

## 2023-04-01 PROCEDURE — 80053 COMPREHEN METABOLIC PANEL: CPT | Performed by: EMERGENCY MEDICINE

## 2023-04-01 PROCEDURE — 25010000002 ONDANSETRON PER 1 MG: Performed by: EMERGENCY MEDICINE

## 2023-04-01 PROCEDURE — 83970 ASSAY OF PARATHORMONE: CPT | Performed by: HOSPITALIST

## 2023-04-01 PROCEDURE — 85025 COMPLETE CBC W/AUTO DIFF WBC: CPT | Performed by: EMERGENCY MEDICINE

## 2023-04-01 PROCEDURE — 93010 ELECTROCARDIOGRAM REPORT: CPT | Performed by: STUDENT IN AN ORGANIZED HEALTH CARE EDUCATION/TRAINING PROGRAM

## 2023-04-01 PROCEDURE — P9612 CATHETERIZE FOR URINE SPEC: HCPCS

## 2023-04-01 PROCEDURE — 25010000002 CEFTRIAXONE PER 250 MG: Performed by: EMERGENCY MEDICINE

## 2023-04-01 PROCEDURE — 83880 ASSAY OF NATRIURETIC PEPTIDE: CPT | Performed by: EMERGENCY MEDICINE

## 2023-04-01 PROCEDURE — 0202U NFCT DS 22 TRGT SARS-COV-2: CPT | Performed by: EMERGENCY MEDICINE

## 2023-04-01 PROCEDURE — 82962 GLUCOSE BLOOD TEST: CPT

## 2023-04-01 PROCEDURE — 70450 CT HEAD/BRAIN W/O DYE: CPT

## 2023-04-01 PROCEDURE — 84145 PROCALCITONIN (PCT): CPT | Performed by: STUDENT IN AN ORGANIZED HEALTH CARE EDUCATION/TRAINING PROGRAM

## 2023-04-01 PROCEDURE — 84484 ASSAY OF TROPONIN QUANT: CPT | Performed by: EMERGENCY MEDICINE

## 2023-04-01 PROCEDURE — 93005 ELECTROCARDIOGRAM TRACING: CPT | Performed by: EMERGENCY MEDICINE

## 2023-04-01 RX ORDER — ONDANSETRON 2 MG/ML
4 INJECTION INTRAMUSCULAR; INTRAVENOUS ONCE
Status: COMPLETED | OUTPATIENT
Start: 2023-04-01 | End: 2023-04-01

## 2023-04-01 RX ORDER — VALSARTAN 160 MG/1
160 TABLET ORAL ONCE
Status: DISCONTINUED | OUTPATIENT
Start: 2023-04-01 | End: 2023-04-05 | Stop reason: HOSPADM

## 2023-04-01 RX ORDER — ONDANSETRON 4 MG/1
4 TABLET, FILM COATED ORAL EVERY 6 HOURS PRN
Status: DISCONTINUED | OUTPATIENT
Start: 2023-04-01 | End: 2023-04-05 | Stop reason: HOSPADM

## 2023-04-01 RX ORDER — VALSARTAN 160 MG/1
160 TABLET ORAL
Status: DISCONTINUED | OUTPATIENT
Start: 2023-04-02 | End: 2023-04-05 | Stop reason: HOSPADM

## 2023-04-01 RX ORDER — LEVOTHYROXINE SODIUM 88 UG/1
88 TABLET ORAL EVERY OTHER DAY
Status: DISCONTINUED | OUTPATIENT
Start: 2023-04-02 | End: 2023-04-05 | Stop reason: HOSPADM

## 2023-04-01 RX ORDER — CHOLECALCIFEROL (VITAMIN D3) 125 MCG
5 CAPSULE ORAL NIGHTLY PRN
Status: DISCONTINUED | OUTPATIENT
Start: 2023-04-01 | End: 2023-04-05 | Stop reason: HOSPADM

## 2023-04-01 RX ORDER — VERAPAMIL HYDROCHLORIDE 240 MG/1
240 TABLET, FILM COATED, EXTENDED RELEASE ORAL DAILY
Status: DISCONTINUED | OUTPATIENT
Start: 2023-04-02 | End: 2023-04-05 | Stop reason: HOSPADM

## 2023-04-01 RX ORDER — LEVOTHYROXINE SODIUM 0.1 MG/1
100 TABLET ORAL
Status: DISCONTINUED | OUTPATIENT
Start: 2023-04-03 | End: 2023-04-05 | Stop reason: HOSPADM

## 2023-04-01 RX ORDER — ACETAMINOPHEN 325 MG/1
650 TABLET ORAL EVERY 4 HOURS PRN
Status: DISCONTINUED | OUTPATIENT
Start: 2023-04-01 | End: 2023-04-05 | Stop reason: HOSPADM

## 2023-04-01 RX ORDER — HYDROCHLOROTHIAZIDE 25 MG/1
25 TABLET ORAL
Status: DISCONTINUED | OUTPATIENT
Start: 2023-04-02 | End: 2023-04-02

## 2023-04-01 RX ORDER — ALBUTEROL SULFATE 2.5 MG/3ML
2.5 SOLUTION RESPIRATORY (INHALATION) EVERY 4 HOURS PRN
Status: DISCONTINUED | OUTPATIENT
Start: 2023-04-01 | End: 2023-04-05 | Stop reason: HOSPADM

## 2023-04-01 RX ORDER — SODIUM CHLORIDE 0.9 % (FLUSH) 0.9 %
10 SYRINGE (ML) INJECTION EVERY 12 HOURS SCHEDULED
Status: DISCONTINUED | OUTPATIENT
Start: 2023-04-01 | End: 2023-04-05 | Stop reason: HOSPADM

## 2023-04-01 RX ORDER — SODIUM CHLORIDE 0.9 % (FLUSH) 0.9 %
10 SYRINGE (ML) INJECTION AS NEEDED
Status: DISCONTINUED | OUTPATIENT
Start: 2023-04-01 | End: 2023-04-05 | Stop reason: HOSPADM

## 2023-04-01 RX ORDER — BACLOFEN 10 MG/1
10 TABLET ORAL 3 TIMES DAILY PRN
Status: DISCONTINUED | OUTPATIENT
Start: 2023-04-01 | End: 2023-04-05 | Stop reason: HOSPADM

## 2023-04-01 RX ORDER — ESCITALOPRAM OXALATE 10 MG/1
10 TABLET ORAL DAILY
Status: DISCONTINUED | OUTPATIENT
Start: 2023-04-02 | End: 2023-04-05 | Stop reason: HOSPADM

## 2023-04-01 RX ORDER — CETIRIZINE HYDROCHLORIDE 10 MG/1
10 TABLET ORAL DAILY
Status: DISCONTINUED | OUTPATIENT
Start: 2023-04-02 | End: 2023-04-05 | Stop reason: HOSPADM

## 2023-04-01 RX ORDER — SODIUM CHLORIDE 9 MG/ML
40 INJECTION, SOLUTION INTRAVENOUS AS NEEDED
Status: DISCONTINUED | OUTPATIENT
Start: 2023-04-01 | End: 2023-04-05 | Stop reason: HOSPADM

## 2023-04-01 RX ORDER — ENOXAPARIN SODIUM 100 MG/ML
40 INJECTION SUBCUTANEOUS DAILY
Status: DISCONTINUED | OUTPATIENT
Start: 2023-04-02 | End: 2023-04-05 | Stop reason: HOSPADM

## 2023-04-01 RX ORDER — ONDANSETRON 2 MG/ML
4 INJECTION INTRAMUSCULAR; INTRAVENOUS EVERY 6 HOURS PRN
Status: DISCONTINUED | OUTPATIENT
Start: 2023-04-01 | End: 2023-04-05 | Stop reason: HOSPADM

## 2023-04-01 RX ADMIN — Medication 10 ML: at 23:03

## 2023-04-01 RX ADMIN — CEFTRIAXONE SODIUM 1 G: 1 INJECTION, POWDER, FOR SOLUTION INTRAMUSCULAR; INTRAVENOUS at 21:16

## 2023-04-01 RX ADMIN — ONDANSETRON 4 MG: 2 INJECTION INTRAMUSCULAR; INTRAVENOUS at 21:57

## 2023-04-01 RX ADMIN — SODIUM CHLORIDE, POTASSIUM CHLORIDE, SODIUM LACTATE AND CALCIUM CHLORIDE 500 ML: 600; 310; 30; 20 INJECTION, SOLUTION INTRAVENOUS at 19:16

## 2023-04-01 NOTE — ED PROVIDER NOTES
EMERGENCY DEPARTMENT ENCOUNTER    Room Number:  24/24  Date seen:  4/1/2023  PCP: Susanne Sharma APRN  Historian: Patient and her daughter-in-law      HPI:  Chief Complaint: Weakness, nausea and vomiting, urinary frequency, confusion  A complete HPI/ROS/PMH/PSH/SH/FH are unobtainable / limited due to: Patient's confusion  Context: Dee Watts is a 82 y.o. female who presents to the ED for evaluation of new onset confusion, generalized weakness, repeated nausea and vomiting, urinary frequency and dark color urine change over the past week.  Patient lives in an independent living facility.  Ordinarily she ambulates with a walker.  The staff at her facility were concerned about her today because she exhibited some signs of confusion.  They contacted her family.  Her daughter-in-law accompanies her at this visit.  Her daughter-in-law, Mercedes, tells me that the patient was complaining of some dysuria symptoms over the past week.  However the patient apparently was not able to see her PCP this week because she could not get an appointment.  Additionally, the patient reports some nausea vomiting but no diarrhea.  Her daughter-in-law tells me that the patient looked very unstable when trying to ambulate with her rolling walker this afternoon.        PAST MEDICAL HISTORY  Active Ambulatory Problems     Diagnosis Date Noted   • DDD (degenerative disc disease), lumbar 04/11/2017   • Osteoarthritis of hip 06/09/2015   • Lumbosacral neuritis 04/11/2017   • Lumbar canal stenosis 04/11/2017   • Hypothyroidism 04/11/2017   • Hypertension 04/11/2017   • Mood disorder (HCC)    • Vitamin D deficiency    • IBS (irritable bowel syndrome)    • Osteoporosis    • Migraines    • Chronic breast pain 01/18/2018   • Status post total hip replacement, right 02/08/2018   • Mixed stress and urge urinary incontinence 04/21/2020   • Allergic rhinitis 06/05/2020   • Episodic paroxysmal hemicrania, not intractable 05/18/2021   • Vertigo  2021   • Obesity, morbid 2022   • Subdural hematoma 2022   • Hypercalcemia 2022   • Chronic right-sided low back pain without sciatica 2022   • Right-sided headache 2023     Resolved Ambulatory Problems     Diagnosis Date Noted   • Altered mental status 2015   • History of anticoagulant therapy 2015   • Postoperative wound infection 2015   • Hypoxia 2015   • Acute posthemorrhagic anemia 06/10/2015   • Delirium 2015     Past Medical History:   Diagnosis Date   • Anxiety    • Asthma    • Depression    • GERD (gastroesophageal reflux disease)    • H/O bone density study 2012   • History of diverticulosis    • Insomnia    • Osteoarthritis    • Thyroid goiter          PAST SURGICAL HISTORY  Past Surgical History:   Procedure Laterality Date   • ANKLE SURGERY Left    • BACK SURGERY N/A 2005   • COLONOSCOPY N/A 2006   • HEMORRHOIDECTOMY N/A 1981   • HYSTERECTOMY N/A 1972   • PAP SMEAR N/A 10/31/2003   • THYROID CYST EXCISION      DATE NOT SPECIFIED   • WRIST SURGERY      LATERALITY AND DATE NOT SPECIFIED         FAMILY HISTORY  Family History   Problem Relation Age of Onset   • Cancer Other    • Hypertension Other    • Breast cancer Sister    • Breast cancer Cousin    • Ovarian cancer Neg Hx          SOCIAL HISTORY  Social History     Socioeconomic History   • Marital status:    Tobacco Use   • Smoking status: Former     Types: Cigarettes     Quit date: 1990     Years since quittin.2   • Smokeless tobacco: Never   Vaping Use   • Vaping Use: Never used   Substance and Sexual Activity   • Alcohol use: Yes     Comment: 1 DRINK PER WEEK   • Drug use: Yes     Types: Other     Comment: Prescribed gabapentin   • Sexual activity: Never     Partners: Male         ALLERGIES  Codeine, Penicillins, and Sulfa antibiotics        REVIEW OF SYSTEMS  Review of Systems   Constitutional: Positive for activity change. Negative  for fever.   HENT: Negative.    Eyes: Negative for pain and visual disturbance.   Respiratory: Negative for cough and shortness of breath.    Cardiovascular: Negative for chest pain.   Gastrointestinal: Positive for nausea and vomiting. Negative for abdominal pain and diarrhea.   Genitourinary: Positive for dysuria, frequency and urgency.   Skin: Negative for color change.   Neurological: Positive for weakness. Negative for syncope and headaches.   Psychiatric/Behavioral: Positive for confusion.   All other systems reviewed and are negative.           PHYSICAL EXAM  ED Triage Vitals   Temp Heart Rate Resp BP SpO2   04/01/23 1702 04/01/23 1702 04/01/23 1702 04/01/23 1721 04/01/23 1702   99.5 °F (37.5 °C) 77 18 (!) 176/110 94 %      Temp src Heart Rate Source Patient Position BP Location FiO2 (%)   04/01/23 1702 -- -- -- --   Tympanic           Physical Exam      GENERAL: Pleasant, elderly lady, no diaphoresis, no acute distress  HENT: nares patent, normocephalic and atraumatic  EYES: no scleral icterus, EOMI, normal conjunctiva  CV: regular rhythm, normal rate, normal distal pulses  RESPIRATORY: normal effort, no stridor, lungs clear to auscultation bilaterally  ABDOMEN: soft, nontender in all quadrants  MUSCULOSKELETAL: no deformity, no asymmetry  NEURO: alert, moves all extremities, follows commands, no focal motor deficits apparent.  PSYCH:  calm, cooperative, insight and judgment appear to be somewhat compromised  SKIN: warm, dry    Vital signs and nursing notes reviewed.          LAB RESULTS  Recent Results (from the past 24 hour(s))   POC Glucose Once    Collection Time: 04/01/23  5:49 PM    Specimen: Blood   Result Value Ref Range    Glucose 92 70 - 130 mg/dL   Urinalysis With Microscopic If Indicated (No Culture) - Urine, Catheter    Collection Time: 04/01/23  5:58 PM    Specimen: Urine, Catheter   Result Value Ref Range    Color, UA Yellow Yellow, Straw    Appearance, UA Clear Clear    pH, UA 6.0 5.0 - 8.0     Specific Gravity, UA 1.029 1.005 - 1.030    Glucose, UA Negative Negative    Ketones, UA Negative Negative    Bilirubin, UA Negative Negative    Blood, UA Negative Negative    Protein,  mg/dL (2+) (A) Negative    Leuk Esterase, UA Trace (A) Negative    Nitrite, UA Negative Negative    Urobilinogen, UA 1.0 E.U./dL 0.2 - 1.0 E.U./dL   Urinalysis, Microscopic Only - Urine, Catheter    Collection Time: 04/01/23  5:58 PM    Specimen: Urine, Catheter   Result Value Ref Range    RBC, UA None Seen None Seen, 0-2 /HPF    WBC, UA 3-5 (A) None Seen, 0-2 /HPF    Bacteria, UA None Seen None Seen /HPF    Squamous Epithelial Cells, UA 3-6 (A) None Seen, 0-2 /HPF    Hyaline Casts, UA 0-2 None Seen /LPF    Methodology Manual Light Microscopy    Comprehensive Metabolic Panel    Collection Time: 04/01/23  6:03 PM    Specimen: Blood   Result Value Ref Range    Glucose 101 (H) 65 - 99 mg/dL    BUN 23 8 - 23 mg/dL    Creatinine 1.09 (H) 0.57 - 1.00 mg/dL    Sodium 143 136 - 145 mmol/L    Potassium 3.7 3.5 - 5.2 mmol/L    Chloride 106 98 - 107 mmol/L    CO2 25.7 22.0 - 29.0 mmol/L    Calcium 11.0 (H) 8.6 - 10.5 mg/dL    Total Protein 7.2 6.0 - 8.5 g/dL    Albumin 4.3 3.5 - 5.2 g/dL    ALT (SGPT) 12 1 - 33 U/L    AST (SGOT) 12 1 - 32 U/L    Alkaline Phosphatase 56 39 - 117 U/L    Total Bilirubin 0.4 0.0 - 1.2 mg/dL    Globulin 2.9 gm/dL    A/G Ratio 1.5 g/dL    BUN/Creatinine Ratio 21.1 7.0 - 25.0    Anion Gap 11.3 5.0 - 15.0 mmol/L    eGFR 50.2 (L) >60.0 mL/min/1.73   BNP    Collection Time: 04/01/23  6:03 PM    Specimen: Blood   Result Value Ref Range    proBNP 207.0 0.0 - 1,800.0 pg/mL   High Sensitivity Troponin T    Collection Time: 04/01/23  6:03 PM    Specimen: Blood   Result Value Ref Range    HS Troponin T 12 (H) <10 ng/L   CBC Auto Differential    Collection Time: 04/01/23  6:03 PM    Specimen: Blood   Result Value Ref Range    WBC 11.51 (H) 3.40 - 10.80 10*3/mm3    RBC 4.66 3.77 - 5.28 10*6/mm3    Hemoglobin 13.7 12.0 -  15.9 g/dL    Hematocrit 42.9 34.0 - 46.6 %    MCV 92.1 79.0 - 97.0 fL    MCH 29.4 26.6 - 33.0 pg    MCHC 31.9 31.5 - 35.7 g/dL    RDW 13.3 12.3 - 15.4 %    RDW-SD 45.6 37.0 - 54.0 fl    MPV 11.4 6.0 - 12.0 fL    Platelets 247 140 - 450 10*3/mm3    Neutrophil % 72.8 42.7 - 76.0 %    Lymphocyte % 16.6 (L) 19.6 - 45.3 %    Monocyte % 8.5 5.0 - 12.0 %    Eosinophil % 1.2 0.3 - 6.2 %    Basophil % 0.4 0.0 - 1.5 %    Immature Grans % 0.5 0.0 - 0.5 %    Neutrophils, Absolute 8.37 (H) 1.70 - 7.00 10*3/mm3    Lymphocytes, Absolute 1.91 0.70 - 3.10 10*3/mm3    Monocytes, Absolute 0.98 (H) 0.10 - 0.90 10*3/mm3    Eosinophils, Absolute 0.14 0.00 - 0.40 10*3/mm3    Basophils, Absolute 0.05 0.00 - 0.20 10*3/mm3    Immature Grans, Absolute 0.06 (H) 0.00 - 0.05 10*3/mm3    nRBC 0.0 0.0 - 0.2 /100 WBC   Green Top (Gel)    Collection Time: 04/01/23  6:03 PM   Result Value Ref Range    Extra Tube Hold for add-ons.    Lavender Top    Collection Time: 04/01/23  6:03 PM   Result Value Ref Range    Extra Tube hold for add-on    Gold Top - SST    Collection Time: 04/01/23  6:03 PM   Result Value Ref Range    Extra Tube Hold for add-ons.    Light Blue Top    Collection Time: 04/01/23  6:03 PM   Result Value Ref Range    Extra Tube Hold for add-ons.    Respiratory Panel PCR w/COVID-19(SARS-CoV-2) [facility] In-House, NP Swab in UTM/[facility], 3-4 HR TAT - Swab, Nasopharynx    Collection Time: 04/01/23  7:18 PM    Specimen: Nasopharynx; Swab   Result Value Ref Range    ADENOVIRUS, PCR Not Detected Not Detected    Coronavirus 229E Not Detected Not Detected    Coronavirus HKU1 Not Detected Not Detected    Coronavirus NL63 Not Detected Not Detected    Coronavirus OC43 Not Detected Not Detected    COVID19 Not Detected Not Detected - Ref. Range    Human Metapneumovirus Not Detected Not Detected    Human Rhinovirus/Enterovirus Not Detected Not Detected    Influenza A PCR Not Detected Not Detected    Influenza B PCR Not Detected Not  Detected    Parainfluenza Virus 1 Not Detected Not Detected    Parainfluenza Virus 2 Not Detected Not Detected    Parainfluenza Virus 3 Not Detected Not Detected    Parainfluenza Virus 4 Not Detected Not Detected    RSV, PCR Not Detected Not Detected    Bordetella pertussis pcr Not Detected Not Detected    Bordetella parapertussis PCR Not Detected Not Detected    Chlamydophila pneumoniae PCR Not Detected Not Detected    Mycoplasma pneumo by PCR Not Detected Not Detected   ECG 12 Lead Altered Mental Status    Collection Time: 04/01/23  7:25 PM   Result Value Ref Range    QT Interval 405 ms   High Sensitivity Troponin T 2Hr    Collection Time: 04/01/23  8:45 PM    Specimen: Blood   Result Value Ref Range    HS Troponin T 12 (H) <10 ng/L    Troponin T Delta 0 >=-4 - <+4 ng/L       Ordered the above labs and reviewed the results.        RADIOLOGY  CT Head Without Contrast    Result Date: 4/1/2023  CT HEAD WITHOUT CONTRAST  HISTORY: Mental status changes  COMPARISON: None available.  TECHNIQUE: Axial CT imaging was obtained through the brain. No IV contrast was administered.  FINDINGS: No acute intracranial hemorrhage is seen. There is diffuse atrophy. There is periventricular and deep white matter microangiopathic change. There is no midline shift or mass effect. Calcified lesion overlying the left frontal lobe near the vertex may represent a meningioma. Mucous retention cyst is noted within the left maxillary sinus.      No acute intracranial findings.  Radiation dose reduction techniques were utilized, including automated exposure control and exposure modulation based on body size.  This report was finalized on 4/1/2023 9:00 PM by Dr. Rox Jurado M.D.        Ordered the above noted radiological studies. Reviewed by me in PACS.        PROCEDURES  Procedures    EKG           EKG time/Interp time: 1925/1927  Rhythm/Rate: Sinus rhythm, 67 bpm  P waves and MO: Present, 233 ms  QRS, axis: 95 ms, normal axis  ST and T  waves: No acute ischemic changes are notable.    Independently interpreted by me contemporaneously with treatment        MEDICATIONS GIVEN IN ER  Medications   sodium chloride 0.9 % flush 10 mL (has no administration in time range)   cefTRIAXone (ROCEPHIN) 1 g in sodium chloride 0.9 % 100 mL IVPB-VTB (1 g Intravenous New Bag 4/1/23 2116)   ondansetron (ZOFRAN) injection 4 mg (has no administration in time range)   valsartan (DIOVAN) tablet 160 mg (has no administration in time range)   lactated ringers bolus 500 mL (0 mL Intravenous Stopped 4/1/23 1946)             MEDICAL DECISION MAKING, PROGRESS, and CONSULTS    All labs have been independently reviewed by me.  All radiology studies have been reviewed by me and I have also reviewed the radiology report.   EKG's independently viewed and interpreted by me.  Discussion below represents my analysis of pertinent findings related to patient's condition, differential diagnosis, treatment plan and final disposition.      Additional sources:  - Discussed/ obtained information from independent historians: Patient's daughter-in-law, Mercedes, provided much of the history at the bedside for me.    - External (non-ED) record review: I reviewed the recent urgent care center visit from earlier today when patient had initial evaluation of the same symptoms including altered mental status, vomiting, headache and shaking with UTI concerns.  She was advised to come here for further work-up given her age and comorbidities.    - Chronic or social conditions impacting care: Elderly patient, lives in an independent living facility        Orders placed during this visit:  Orders Placed This Encounter   Procedures   • Respiratory Panel PCR w/COVID-19(SARS-CoV-2) [facility]/SR[facility]/MARGUERITE/PAD/[facility]/MAD/K[facility]I In-House, NP Swab in UTM/VTM, 3-4 HR TAT - Swab, Nasopharynx   • CT Head Without Contrast   • Comprehensive Metabolic Panel   • Urinalysis With Microscopic If Indicated (No Culture) - Urine, Catheter   •  [place] Draw   • BNP   • High Sensitivity Troponin T   • CBC Auto Differential   • Urinalysis, Microscopic Only - Urine, Clean Catch   • High Sensitivity Troponin T 2Hr   • Urinalysis With Culture If Indicated -   • Monitor Blood Pressure   • Pulse Oximetry, Continuous   • Discontinue Cardiac Monitoring   • LHA (on-call MD unless specified) Details   • Discontinue Patient Isolation   • POC Glucose Once   • POC Glucose Once   • ECG 12 Lead Altered Mental Status   • Insert Peripheral IV   • Initiate Observation Status   • ED Bed Request   • CBC & Differential   • Green Top (Gel)   • Lavender Top   • Gold Top - SST   • Light Blue Top           Differential diagnosis includes but is not limited to:    UTI, pneumonia, viral illness, acute MI, dehydration      Independent interpretation of labs, radiology studies, and discussions with consultants:  ED Course as of 04/01/23 2128   Sat Apr 01, 2023 1916 Overall, patient is nontoxic-appearing.  However her daughter-in-law expresses concern about the patient's change in mental status and generalized weakness in comparison to her usual baseline.  Therefore we will initiate a broad work-up including infectious work-up, cardiology work-up and neurologic work-up.  Ordering a head CT without contrast.  Ordering EKG and troponin as well.  Urinalysis is already been ordered.  There is small amount of white blood cells present but really no convincing features to verify UTI suspicion. [BIN]   1916 Respiratory viral panel was also unremarkable. [BIN]   2059 Given patient's report of urinary frequency and urgency symptoms, I will go ahead and presumptively treat her for UTI with 1 dose of Rocephin at this time. [BIN]   2059 I discussed with Dr. Bertrand from [facility] about this patient and he agrees to accept her to the hospitalist team for further management tonight. [BIN]   2101 I independently interpreted the CT head without contrast and my findings are: No intracranial hemorrhage, no mass  effect. [BIN]   2128 Patient's son is at the bedside now.  I discussed all the test results with him and the patient.  He expressed concerns about the patient's blood pressure being elevated today and explained that the patient has not been able to take her blood pressure medications for at least 2 days because of her nausea and vomiting.  Therefore I will give her some Zofran now and give her a dose of valsartan 160 mg which is one of her daily medications for blood pressure. [BIN]      ED Course User Index  [BIN] Isra Escobar MD           DIAGNOSIS  Final diagnoses:   Altered mental status, unspecified altered mental status type   Weakness   Dysuria   Nausea and vomiting, unspecified vomiting type   Hypertension, unspecified type         DISPOSITION  Observation to [facility]            Latest Documented Vital Signs:  As of 21:28 EDT  BP- (!) 183/97 HR- 73 Temp- 99.5 °F (37.5 °C) (Tympanic) O2 sat- 94%              --    Please note that portions of this were completed with a voice recognition program.       Note Disclaimer: At [facility], we believe that sharing information builds trust and better relationships. You are receiving this note because you are receiving care at [facility] or recently visited. It is possible you will see health information before a provider has talked with you about it. This kind of information can be easy to misunderstand. To help you fully understand what it means for your health, we urge you to discuss this note with your provider.           Isra Escobar MD  04/01/23 2101       Isra Escobar MD  04/01/23 2129

## 2023-04-01 NOTE — ED NOTES
Pt c/o weakness, N/V, abdominal pain. Pt states the vomiting began last night and reports several episodes. Pt also reports burning with urination, frequency, dark color, and foul odor. Pt states the urinary symptoms began about a week ago, but have resolved without any treatment. Pt denies diarrhea. Pt also states she hasn't had an appetite or drank much in the past couple days. Pt states she didn't take any of her home medications today or yesterday.     This RN wore mask, gloves, eyewear and all other appropriate PPE while performing patient care.

## 2023-04-01 NOTE — Clinical Note
Level of Care: Med/Surg [1]   Diagnosis: AMS (altered mental status) [[id number]]   Admitting Physician: YADY ADAME [[id number]]   Attending Physician: YADY ADAME [[id number]]

## 2023-04-01 NOTE — ED TRIAGE NOTES
Pt sent in to ER for confusion, weakness, urinary frequency and dark/foul smelling urine.     Pt appears alert/oriented in no distress, was able to answer orientation questions in triage.     Pt and RN wearing mask upon triage.

## 2023-04-02 ENCOUNTER — APPOINTMENT (OUTPATIENT)
Dept: CT IMAGING | Facility: HOSPITAL | Age: 83
End: 2023-04-02
Payer: MEDICARE

## 2023-04-02 PROBLEM — R11.2 NAUSEA AND VOMITING: Status: ACTIVE | Noted: 2023-04-02

## 2023-04-02 PROBLEM — R53.1 WEAKNESS: Status: ACTIVE | Noted: 2023-04-02

## 2023-04-02 LAB
ANION GAP SERPL CALCULATED.3IONS-SCNC: 9 MMOL/L (ref 5–15)
BASOPHILS # BLD AUTO: 0.05 10*3/MM3 (ref 0–0.2)
BASOPHILS NFR BLD AUTO: 0.6 % (ref 0–1.5)
BUN SERPL-MCNC: 20 MG/DL (ref 8–23)
BUN/CREAT SERPL: 21.1 (ref 7–25)
CALCIUM SPEC-SCNC: 10 MG/DL (ref 8.6–10.5)
CHLORIDE SERPL-SCNC: 107 MMOL/L (ref 98–107)
CO2 SERPL-SCNC: 27 MMOL/L (ref 22–29)
CREAT SERPL-MCNC: 0.95 MG/DL (ref 0.57–1)
DEPRECATED RDW RBC AUTO: 43.1 FL (ref 37–54)
EGFRCR SERPLBLD CKD-EPI 2021: 59.2 ML/MIN/1.73
EOSINOPHIL # BLD AUTO: 0.23 10*3/MM3 (ref 0–0.4)
EOSINOPHIL NFR BLD AUTO: 2.6 % (ref 0.3–6.2)
ERYTHROCYTE [DISTWIDTH] IN BLOOD BY AUTOMATED COUNT: 13.1 % (ref 12.3–15.4)
GLUCOSE SERPL-MCNC: 98 MG/DL (ref 65–99)
HCT VFR BLD AUTO: 42.2 % (ref 34–46.6)
HGB BLD-MCNC: 13.6 G/DL (ref 12–15.9)
IMM GRANULOCYTES # BLD AUTO: 0.04 10*3/MM3 (ref 0–0.05)
IMM GRANULOCYTES NFR BLD AUTO: 0.4 % (ref 0–0.5)
LYMPHOCYTES # BLD AUTO: 1.64 10*3/MM3 (ref 0.7–3.1)
LYMPHOCYTES NFR BLD AUTO: 18.4 % (ref 19.6–45.3)
MAGNESIUM SERPL-MCNC: 2.3 MG/DL (ref 1.6–2.4)
MCH RBC QN AUTO: 29.3 PG (ref 26.6–33)
MCHC RBC AUTO-ENTMCNC: 32.2 G/DL (ref 31.5–35.7)
MCV RBC AUTO: 90.9 FL (ref 79–97)
MONOCYTES # BLD AUTO: 0.72 10*3/MM3 (ref 0.1–0.9)
MONOCYTES NFR BLD AUTO: 8.1 % (ref 5–12)
NEUTROPHILS NFR BLD AUTO: 6.21 10*3/MM3 (ref 1.7–7)
NEUTROPHILS NFR BLD AUTO: 69.9 % (ref 42.7–76)
NRBC BLD AUTO-RTO: 0 /100 WBC (ref 0–0.2)
PHOSPHATE SERPL-MCNC: 4.1 MG/DL (ref 2.5–4.5)
PLATELET # BLD AUTO: 228 10*3/MM3 (ref 140–450)
PMV BLD AUTO: 11.2 FL (ref 6–12)
POTASSIUM SERPL-SCNC: 3.9 MMOL/L (ref 3.5–5.2)
PTH-INTACT SERPL-MCNC: 51.1 PG/ML (ref 15–65)
QT INTERVAL: 405 MS
RBC # BLD AUTO: 4.64 10*6/MM3 (ref 3.77–5.28)
SODIUM SERPL-SCNC: 143 MMOL/L (ref 136–145)
TSH SERPL DL<=0.05 MIU/L-ACNC: 1.99 UIU/ML (ref 0.27–4.2)
VIT B12 BLD-MCNC: 235 PG/ML (ref 211–946)
WBC NRBC COR # BLD: 8.89 10*3/MM3 (ref 3.4–10.8)

## 2023-04-02 PROCEDURE — 83735 ASSAY OF MAGNESIUM: CPT | Performed by: HOSPITALIST

## 2023-04-02 PROCEDURE — 25010000002 ENOXAPARIN PER 10 MG: Performed by: STUDENT IN AN ORGANIZED HEALTH CARE EDUCATION/TRAINING PROGRAM

## 2023-04-02 PROCEDURE — 82784 ASSAY IGA/IGD/IGG/IGM EACH: CPT | Performed by: HOSPITALIST

## 2023-04-02 PROCEDURE — 96372 THER/PROPH/DIAG INJ SC/IM: CPT

## 2023-04-02 PROCEDURE — 25010000002 CYANOCOBALAMIN PER 1000 MCG: Performed by: HOSPITALIST

## 2023-04-02 PROCEDURE — 82607 VITAMIN B-12: CPT | Performed by: STUDENT IN AN ORGANIZED HEALTH CARE EDUCATION/TRAINING PROGRAM

## 2023-04-02 PROCEDURE — 63710000001 ONDANSETRON PER 8 MG: Performed by: STUDENT IN AN ORGANIZED HEALTH CARE EDUCATION/TRAINING PROGRAM

## 2023-04-02 PROCEDURE — 36415 COLL VENOUS BLD VENIPUNCTURE: CPT | Performed by: STUDENT IN AN ORGANIZED HEALTH CARE EDUCATION/TRAINING PROGRAM

## 2023-04-02 PROCEDURE — G0378 HOSPITAL OBSERVATION PER HR: HCPCS

## 2023-04-02 PROCEDURE — 80048 BASIC METABOLIC PNL TOTAL CA: CPT | Performed by: STUDENT IN AN ORGANIZED HEALTH CARE EDUCATION/TRAINING PROGRAM

## 2023-04-02 PROCEDURE — 97162 PT EVAL MOD COMPLEX 30 MIN: CPT

## 2023-04-02 PROCEDURE — 74176 CT ABD & PELVIS W/O CONTRAST: CPT

## 2023-04-02 PROCEDURE — 83521 IG LIGHT CHAINS FREE EACH: CPT | Performed by: HOSPITALIST

## 2023-04-02 PROCEDURE — 84100 ASSAY OF PHOSPHORUS: CPT | Performed by: HOSPITALIST

## 2023-04-02 PROCEDURE — 84443 ASSAY THYROID STIM HORMONE: CPT | Performed by: STUDENT IN AN ORGANIZED HEALTH CARE EDUCATION/TRAINING PROGRAM

## 2023-04-02 PROCEDURE — 87040 BLOOD CULTURE FOR BACTERIA: CPT | Performed by: STUDENT IN AN ORGANIZED HEALTH CARE EDUCATION/TRAINING PROGRAM

## 2023-04-02 PROCEDURE — 85025 COMPLETE CBC W/AUTO DIFF WBC: CPT | Performed by: STUDENT IN AN ORGANIZED HEALTH CARE EDUCATION/TRAINING PROGRAM

## 2023-04-02 PROCEDURE — 84155 ASSAY OF PROTEIN SERUM: CPT | Performed by: HOSPITALIST

## 2023-04-02 PROCEDURE — 84165 PROTEIN E-PHORESIS SERUM: CPT | Performed by: HOSPITALIST

## 2023-04-02 PROCEDURE — 86334 IMMUNOFIX E-PHORESIS SERUM: CPT | Performed by: HOSPITALIST

## 2023-04-02 PROCEDURE — 97530 THERAPEUTIC ACTIVITIES: CPT

## 2023-04-02 RX ORDER — SODIUM CHLORIDE 9 MG/ML
50 INJECTION, SOLUTION INTRAVENOUS CONTINUOUS
Status: DISCONTINUED | OUTPATIENT
Start: 2023-04-02 | End: 2023-04-05

## 2023-04-02 RX ORDER — CYANOCOBALAMIN 1000 UG/ML
1000 INJECTION, SOLUTION INTRAMUSCULAR; SUBCUTANEOUS ONCE
Status: COMPLETED | OUTPATIENT
Start: 2023-04-02 | End: 2023-04-02

## 2023-04-02 RX ORDER — NYSTATIN 100000 U/G
1 CREAM TOPICAL EVERY 12 HOURS SCHEDULED
Status: DISCONTINUED | OUTPATIENT
Start: 2023-04-02 | End: 2023-04-05 | Stop reason: HOSPADM

## 2023-04-02 RX ADMIN — HYDROCHLOROTHIAZIDE 25 MG: 25 TABLET ORAL at 08:26

## 2023-04-02 RX ADMIN — Medication 10 ML: at 08:27

## 2023-04-02 RX ADMIN — VERAPAMIL HYDROCHLORIDE 240 MG: 240 TABLET, FILM COATED, EXTENDED RELEASE ORAL at 08:26

## 2023-04-02 RX ADMIN — NYSTATIN 1 APPLICATION: 100000 CREAM TOPICAL at 21:13

## 2023-04-02 RX ADMIN — CETIRIZINE HYDROCHLORIDE 10 MG: 10 TABLET ORAL at 08:26

## 2023-04-02 RX ADMIN — NYSTATIN 1 APPLICATION: 100000 CREAM TOPICAL at 16:43

## 2023-04-02 RX ADMIN — Medication 10 ML: at 21:13

## 2023-04-02 RX ADMIN — ONDANSETRON HYDROCHLORIDE 4 MG: 4 TABLET, FILM COATED ORAL at 10:49

## 2023-04-02 RX ADMIN — ESCITALOPRAM OXALATE 10 MG: 10 TABLET, FILM COATED ORAL at 08:27

## 2023-04-02 RX ADMIN — ENOXAPARIN SODIUM 40 MG: 100 INJECTION SUBCUTANEOUS at 08:27

## 2023-04-02 RX ADMIN — CYANOCOBALAMIN 1000 MCG: 1000 INJECTION, SOLUTION INTRAMUSCULAR; SUBCUTANEOUS at 16:43

## 2023-04-02 RX ADMIN — VALSARTAN 160 MG: 160 TABLET, FILM COATED ORAL at 08:27

## 2023-04-02 RX ADMIN — SODIUM CHLORIDE 50 ML/HR: 9 INJECTION, SOLUTION INTRAVENOUS at 12:56

## 2023-04-02 RX ADMIN — ACETAMINOPHEN 650 MG: 325 TABLET ORAL at 17:02

## 2023-04-02 NOTE — PLAN OF CARE
Goal Outcome Evaluation:  Plan of Care Reviewed With: patient, son        Progress: improving  Outcome Evaluation: Ms. Watts is an 81 y/o F with PMH including but not limited to HTN, hypothyroidism, and depression who was admitted to [facility] on 4/1/23 for dysuria and frequency for the last week. She is accompained by her son who asssit with hx. Prior to hospital admission, she resided in independent living facility where her meals were delivered. Son reports patient very sedentary and decreased independence over the last several months and concerned regarding to ability to return to current facility. She presents with generalized weakness, limited activity tolerance due to fatigue with minimal activity, dizziness (hx of vertigo), and decreased functional ability. She required SBA for supine-sit, Page for sit-stand and Page to walk 5' from bed-chair. Patient with mild unsteadiness and quick to fatigue. She would benefit from skilled PT during her hospital stay to address previoulsy mentioned deficits prior to recommended d/c to rehab.

## 2023-04-02 NOTE — ED NOTES
Nursing report ED to floor  Dee Mac  82 y.o.  female    HPI :   Chief Complaint   Patient presents with    Weakness - Generalized    Urinary Frequency       Admitting doctor:   Frank Bertrand MD    Admitting diagnosis:   The primary encounter diagnosis was Altered mental status, unspecified altered mental status type. Diagnoses of Weakness and Dysuria were also pertinent to this visit.    Code status:   Current Code Status       Date Active Code Status Order ID Comments User Context       Not on file            Allergies:   Codeine, Penicillins, and Sulfa antibiotics    Isolation:   No active isolations    Intake and Output    Intake/Output Summary (Last 24 hours) at 4/1/2023 2112  Last data filed at 4/1/2023 1946  Gross per 24 hour   Intake 500 ml   Output --   Net 500 ml       Weight:       04/01/23  1703   Weight: 90.7 kg (200 lb)       Most recent vitals:   Vitals:    04/01/23 1831 04/01/23 1832 04/01/23 2002 04/01/23 2101   BP: 164/91   (!) 183/97   Pulse:  71 67 73   Resp:       Temp:       TempSrc:       SpO2:  92% 94% 94%   Weight:       Height:           Active LDAs/IV Access:   Lines, Drains & Airways       Active LDAs       Name Placement date Placement time Site Days    Peripheral IV 04/01/23 1803 Right Hand 04/01/23  1803  Hand  less than 1                    Labs (abnormal labs have a star):   Labs Reviewed   COMPREHENSIVE METABOLIC PANEL - Abnormal; Notable for the following components:       Result Value    Glucose 101 (*)     Creatinine 1.09 (*)     Calcium 11.0 (*)     eGFR 50.2 (*)     All other components within normal limits    Narrative:     GFR Normal >60  Chronic Kidney Disease <60  Kidney Failure <15    The GFR formula is only valid for adults with stable renal function between ages 18 and 70.   URINALYSIS W/ MICROSCOPIC IF INDICATED (NO CULTURE) - Abnormal; Notable for the following components:    Protein,  mg/dL (2+) (*)     Leuk Esterase, UA Trace (*)     All other  components within normal limits   TROPONIN - Abnormal; Notable for the following components:    HS Troponin T 12 (*)     All other components within normal limits    Narrative:     High Sensitive Troponin T Reference Range:  <10.0 ng/L- Negative Female for AMI  <15.0 ng/L- Negative Male for AMI  >=10 - Abnormal Female indicating possible myocardial injury.  >=15 - Abnormal Male indicating possible myocardial injury.   Clinicians would have to utilize clinical acumen, EKG, Troponin, and serial changes to determine if it is an Acute Myocardial Infarction or myocardial injury due to an underlying chronic condition.        CBC WITH AUTO DIFFERENTIAL - Abnormal; Notable for the following components:    WBC 11.51 (*)     Lymphocyte % 16.6 (*)     Neutrophils, Absolute 8.37 (*)     Monocytes, Absolute 0.98 (*)     Immature Grans, Absolute 0.06 (*)     All other components within normal limits   URINALYSIS, MICROSCOPIC ONLY - Abnormal; Notable for the following components:    WBC, UA 3-5 (*)     Squamous Epithelial Cells, UA 3-6 (*)     All other components within normal limits   RESPIRATORY PANEL PCR W/ COVID-19 (SARS-COV-2) [facility] IN-HOUSE, NP SWAB IN UT/VTP, 3-4 HR TAT - Normal    Narrative:     In the setting of a positive respiratory panel with a viral infection PLUS a negative procalcitonin without other underlying concern for bacterial infection, consider observing off antibiotics or discontinuation of antibiotics and continue supportive care. If the respiratory panel is positive for atypical bacterial infection (Bordetella pertussis, Chlamydophila pneumoniae, or Mycoplasma pneumoniae), consider antibiotic de-escalation to target atypical bacterial infection.   BNP (IN-HOUSE) - Normal    Narrative:     Among patients with dyspnea, NT-proBNP is highly sensitive for the detection of acute congestive heart failure. In addition NT-proBNP of <300 pg/ml effectively rules out acute congestive heart  failure with 99% negative predictive value.    Results may be falsely decreased if patient taking Biotin.     POCT GLUCOSE FINGERSTICK - Normal   RAINBOW DRAW    Narrative:     The following orders were created for panel order [facility] Draw.  Procedure                               Abnormality         Status                     ---------                               -----------         ------                     Green Top (Gel)[[id number]]                                  Final result               Lavender Top[[id number]]                                     Final result               Gold Top - SST[[id number]]                                   Final result               Light Blue Top[[id number]]                                   Final result                 Please view results for these tests on the individual orders.   HIGH SENSITIVITIY TROPONIN T 2HR   URINALYSIS W/ CULTURE IF INDICATED   POCT GLUCOSE FINGERSTICK   CBC AND DIFFERENTIAL    Narrative:     The following orders were created for panel order CBC & Differential.  Procedure                               Abnormality         Status                     ---------                               -----------         ------                     CBC Auto Differential[[id number]]        Abnormal            Final result                 Please view results for these tests on the individual orders.   GREEN TOP   LAVENDER TOP   GOLD TOP - SST   LIGHT BLUE TOP       EKG:   ECG 12 Lead Altered Mental Status   Preliminary Result   HEART RATE= 67  bpm   RR Interval= 896  ms   WI Interval= 233  ms   P Horizontal Axis= 19  deg   P Front Axis= 80  deg   QRSD Interval= 95  ms   QT Interval= 405  ms   QRS Axis= 21  deg   T Wave Axis= 36  deg   - ABNORMAL ECG -   Sinus rhythm   Prolonged WI interval   Low voltage, precordial leads   Electronically Signed By:    Date and Time of Study: 2023-04-01 19:25:23          Meds given in ED:   Medications   sodium chloride 0.9 % flush 10 mL  (has no administration in time range)   cefTRIAXone (ROCEPHIN) 1 g in sodium chloride 0.9 % 100 mL IVPB-VTB (has no administration in time range)   lactated ringers bolus 500 mL (0 mL Intravenous Stopped 23)       Imaging results:  CT Head Without Contrast    Result Date: 2023  No acute intracranial findings.  Radiation dose reduction techniques were utilized, including automated exposure control and exposure modulation based on body size.  This report was finalized on 2023 9:00 PM by Dr. Rox Jurado M.D.       Ambulatory status:   - assist    Social issues:   Social History     Socioeconomic History    Marital status:    Tobacco Use    Smoking status: Former     Types: Cigarettes     Quit date: 1990     Years since quittin.2    Smokeless tobacco: Never   Vaping Use    Vaping Use: Never used   Substance and Sexual Activity    Alcohol use: Yes     Comment: 1 DRINK PER WEEK    Drug use: Yes     Types: Other     Comment: Prescribed gabapentin    Sexual activity: Never     Partners: Male       NIH Stroke Scale:         Alvina Stevens RN  23 21:12 EDT

## 2023-04-02 NOTE — PLAN OF CARE
Problem: Fall Injury Risk  Goal: Absence of Fall and Fall-Related Injury  4/2/2023 0030 by Radha Vasquez RN  Outcome: Ongoing, Progressing  4/2/2023 0029 by Radha Vasquez RN  Outcome: Ongoing, Progressing  Intervention: Identify and Manage Contributors  Recent Flowsheet Documentation  Taken 4/1/2023 2313 by Radha Vasquez RN  Medication Review/Management: medications reviewed  Intervention: Promote Injury-Free Environment  Recent Flowsheet Documentation  Taken 4/1/2023 2313 by Radha Vasquez RN  Safety Promotion/Fall Prevention:   assistive device/personal items within reach   clutter free environment maintained   activity supervised   fall prevention program maintained   nonskid shoes/slippers when out of bed   safety round/check completed     Problem: Adult Inpatient Plan of Care  Goal: Plan of Care Review  4/2/2023 0030 by Radha Vasquez RN  Outcome: Ongoing, Progressing  4/2/2023 0029 by Radha Vasquez RN  Outcome: Ongoing, Progressing  Flowsheets (Taken 4/2/2023 0029)  Plan of Care Reviewed With: patient  Goal: Patient-Specific Goal (Individualized)  4/2/2023 0030 by Radha Vasquez RN  Outcome: Ongoing, Progressing  4/2/2023 0029 by Radha Vasquez RN  Outcome: Ongoing, Progressing  Goal: Absence of Hospital-Acquired Illness or Injury  4/2/2023 0030 by Radha Vasquez RN  Outcome: Ongoing, Progressing  4/2/2023 0029 by Radha Vasquez RN  Outcome: Ongoing, Progressing  Intervention: Identify and Manage Fall Risk  Recent Flowsheet Documentation  Taken 4/1/2023 2313 by Radha Vasquez RN  Safety Promotion/Fall Prevention:   assistive device/personal items within reach   clutter free environment maintained   activity supervised   fall prevention program maintained   nonskid shoes/slippers when out of bed   safety round/check completed  Intervention: Prevent Skin Injury  Recent Flowsheet Documentation  Taken 4/1/2023 2313 by Radha Vasquez RN  Body Position: position changed  independently  Intervention: Prevent and Manage VTE (Venous Thromboembolism) Risk  Recent Flowsheet Documentation  Taken 4/1/2023 2313 by Radha Vasquez RN  Activity Management: activity adjusted per tolerance  VTE Prevention/Management:   bilateral   sequential compression devices on  Goal: Optimal Comfort and Wellbeing  4/2/2023 0030 by Radha Vasquez RN  Outcome: Ongoing, Progressing  4/2/2023 0029 by Radha Vasquez RN  Outcome: Ongoing, Progressing  Intervention: Provide Person-Centered Care  Recent Flowsheet Documentation  Taken 4/1/2023 2313 by Radha Vasquez RN  Trust Relationship/Rapport: care explained  Goal: Readiness for Transition of Care  4/2/2023 0030 by Radha Vasquez RN  Outcome: Ongoing, Progressing  4/2/2023 0029 by Radha Vasquez RN  Outcome: Ongoing, Progressing  Intervention: Mutually Develop Transition Plan  Recent Flowsheet Documentation  Taken 4/1/2023 2315 by Radha Vasquez RN  Transportation Anticipated: family or friend will provide  Patient/Family Anticipated Services at Transition:   Patient/Family Anticipates Transition to: (pt resides at [facility] in [address])   other (see comments)   home  Taken 4/1/2023 2307 by Radha Vasquez RN  Equipment Currently Used at Home:   walker, rolling   nebulizer   shower chair   Goal Outcome Evaluation:  Plan of Care Reviewed With: patient

## 2023-04-02 NOTE — THERAPY EVALUATION
Patient Name: Dee Watts  : 1940    MRN: [id number]                              Today's Date: 2023       Admit Date: 2023    Visit Dx:     ICD-10-CM ICD-9-CM   1. Altered mental status, unspecified altered mental status type  R41.82 780.97   2. Weakness  R53.1 780.79   3. Dysuria  R30.0 788.1   4. Nausea and vomiting, unspecified vomiting type  R11.2 787.01   5. Hypertension, unspecified type  I10 401.9     Patient Active Problem List   Diagnosis   • DDD (degenerative disc disease), lumbar   • Osteoarthritis of hip   • Lumbosacral neuritis   • Lumbar canal stenosis   • Hypothyroidism   • Hypertension   • Mood disorder (HCC)   • Vitamin D deficiency   • IBS (irritable bowel syndrome)   • Osteoporosis   • Migraines   • Chronic breast pain   • Status post total hip replacement, right   • Mixed stress and urge urinary incontinence   • Allergic rhinitis   • Episodic paroxysmal hemicrania, not intractable   • Vertigo   • Obesity, morbid   • Subdural hematoma   • Hypercalcemia   • Chronic right-sided low back pain without sciatica   • Right-sided headache   • AMS (altered mental status)   • UTI (urinary tract infection)   • Nausea and vomiting   • Weakness     Past Medical History:   Diagnosis Date   • Allergic rhinitis    • Anxiety    • Asthma    • DDD (degenerative disc disease), lumbar    • Depression    • GERD (gastroesophageal reflux disease)    • H/O bone density study 2012   • History of diverticulosis    • Hypertension    • Hypothyroidism    • IBS (irritable bowel syndrome)    • Insomnia    • Lumbar canal stenosis    • Lumbosacral neuritis    • Migraines    • Mood disorder    • Osteoarthritis    • Osteoporosis    • Thyroid goiter    • Vitamin D deficiency      Past Surgical History:   Procedure Laterality Date   • ANKLE SURGERY Left    • BACK SURGERY N/A 2005   • COLONOSCOPY N/A 2006   • HEMORRHOIDECTOMY N/A 1981   • HYSTERECTOMY N/A 1972   • PAP SMEAR N/A  10/31/2003   • THYROID CYST EXCISION      DATE NOT SPECIFIED   • WRIST SURGERY      LATERALITY AND DATE NOT SPECIFIED      General Information     Row Name 04/02/23 1407          Physical Therapy Time and Intention    Document Type evaluation  -VANDANA     Mode of Treatment individual therapy;physical therapy  -VANDANA     Row Name 04/02/23 1407          General Information    Patient Profile Reviewed yes  -VANDANA     Prior Level of Function independent:;all household mobility;community mobility;gait;transfer;bed mobility;ADL's  -VANDANA     Existing Precautions/Restrictions fall;oxygen therapy device and L/min  -VANDANA     Barriers to Rehab previous functional deficit  -VANDANA     Row Name 04/02/23 1407          Living Environment    People in Home facility resident;alone  Independent living facility  -VANDANA     Row Name 04/02/23 1407          Home Main Entrance    Number of Stairs, Main Entrance none  -VANDANA     Row Name 04/02/23 1407          Stairs Within Home, Primary    Number of Stairs, Within Home, Primary none  -VANDANA     Row Name 04/02/23 1407          Cognition    Orientation Status (Cognition) oriented x 3  -VANDANA     Row Name 04/02/23 1407          Safety Issues, Functional Mobility    Safety Issues Affecting Function (Mobility) awareness of need for assistance;insight into deficits/self-awareness;positioning of assistive device;problem-solving;sequencing abilities  -VANDANA     Impairments Affecting Function (Mobility) balance;endurance/activity tolerance;pain;shortness of breath;strength  -VANDANA           User Key  (r) = Recorded By, (t) = Taken By, (c) = Cosigned By    Initials Name Provider Type    Aundrea Schmidt, PT Physical Therapist               Mobility     Row Name 04/02/23 1408          Bed Mobility    Bed Mobility supine-sit  -VANDANA     Supine-Sit Wilmer (Bed Mobility) contact guard;1 person assist  -VANDANA     Assistive Device (Bed Mobility) bed rails;head of bed elevated  -VANDANA     Row Name 04/02/23 1408          Bed-Chair  Transfer    Bed-Chair Bradford (Transfers) minimum assist (75% patient effort);1 person assist  -VANDANA     Assistive Device (Bed-Chair Transfers) walker, front-wheeled  -VANDANA     Row Name 04/02/23 1408          Sit-Stand Transfer    Sit-Stand Bradford (Transfers) minimum assist (75% patient effort);1 person assist  -VANDANA     Assistive Device (Sit-Stand Transfers) walker, front-wheeled  -VANDANA     Row Name 04/02/23 1408          Gait/Stairs (Locomotion)    Bradford Level (Gait) minimum assist (75% patient effort);1 person assist  -VANDANA     Assistive Device (Gait) walker, front-wheeled  -VANDANA     Distance in Feet (Gait) 5'  -VANDANA     Deviations/Abnormal Patterns (Gait) bilateral deviations;gait speed decreased;flakito decreased;base of support, wide;stride length decreased  -     Bilateral Gait Deviations forward flexed posture;heel strike decreased  -           User Key  (r) = Recorded By, (t) = Taken By, (c) = Cosigned By    Initials Name Provider Type    Aundrea Schmidt PT Physical Therapist               Obj/Interventions     Row Name 04/02/23 1409          Range of Motion Comprehensive    General Range of Motion no range of motion deficits identified  -     Row Name 04/02/23 1409          Strength Comprehensive (MMT)    General Manual Muscle Testing (MMT) Assessment lower extremity strength deficits identified  -     Comment, General Manual Muscle Testing (MMT) Assessment generalized weakness  -     Row Name 04/02/23 1409          Motor Skills    Therapeutic Exercise other (see comments)  AP/QS/GS/HS/LAQ X 10  -     Row Name 04/02/23 1409          Balance    Comment, Balance mild unsteadiness upon standing and when walking bed-chair, patient reports unsteady due to fatigue  -           User Key  (r) = Recorded By, (t) = Taken By, (c) = Cosigned By    Initials Name Provider Type    Aundrea Schmidt PT Physical Therapist               Goals/Plan     Row Name 04/02/23 1417           Bed Mobility Goal 1 (PT)    Activity/Assistive Device (Bed Mobility Goal 1, PT) bed mobility activities, all  -VANDANA     Isabella Level/Cues Needed (Bed Mobility Goal 1, PT) independent  -VANDANA     Time Frame (Bed Mobility Goal 1, PT) 1 week  -VANDANA     Row Name 04/02/23 1415          Transfer Goal 1 (PT)    Activity/Assistive Device (Transfer Goal 1, PT) sit-to-stand/stand-to-sit;bed-to-chair/chair-to-bed  -VANDANA     Isabella Level/Cues Needed (Transfer Goal 1, PT) standby assist;modified independence  -VANDANA     Time Frame (Transfer Goal 1, PT) 1 week  -VANDANA     Row Name 04/02/23 1415          Gait Training Goal 1 (PT)    Activity/Assistive Device (Gait Training Goal 1, PT) gait (walking locomotion)  -VANDANA     Isabella Level (Gait Training Goal 1, PT) standby assist  -VANDANA     Distance (Gait Training Goal 1, PT) 50'  -VANDANA     Time Frame (Gait Training Goal 1, PT) 1 week  -VANDANA     Row Name 04/02/23 1415          Therapy Assessment/Plan (PT)    Planned Therapy Interventions (PT) balance training;bed mobility training;gait training;home exercise program;postural re-education;patient/family education;neuromuscular re-education;strengthening;transfer training  -VANDANA           User Key  (r) = Recorded By, (t) = Taken By, (c) = Cosigned By    Initials Name Provider Type    Aundrea Schmidt, PT Physical Therapist               Clinical Impression     Row Name 04/02/23 1410          Pain    Pretreatment Pain Rating 0/10 - no pain  -VANDANA     Posttreatment Pain Rating 0/10 - no pain  -VANDANA     Row Name 04/02/23 1410          Plan of Care Review    Plan of Care Reviewed With patient;son  -VANDANA     Progress improving  -     Outcome Evaluation Ms. Watts is an 83 y/o F with PMH including but not limited to HTN, hypothyroidism, and depression who was admitted to [facility] on 4/1/23 for dysuria and frequency for the last week. She is accompained by her son who asssit with hx. Prior to hospital admission, she resided in independent living  facility where her meals were delivered. Son reports patient very sedentary and decreased independence over the last several months and concerned regarding to ability to return to current facility. She presents with generalized weakness, limited activity tolerance due to fatigue with minimal activity, dizziness (hx of vertigo), and decreased functional ability. She required SBA for supine-sit, Page for sit-stand and Page to walk 5' from bed-chair. Patient with mild unsteadiness and quick to fatigue. She would benefit from skilled PT during her hospital stay to address previoulsy mentioned deficits prior to recommended d/c to rehab.  -     Row Name 04/02/23 1410          Therapy Assessment/Plan (PT)    Rehab Potential (PT) good, to achieve stated therapy goals  -     Criteria for Skilled Interventions Met (PT) yes;meets criteria;skilled treatment is necessary  -     Therapy Frequency (PT) 6 times/wk  -     Row Name 04/02/23 1410          Vital Signs    O2 Delivery Pre Treatment supplemental O2  2L  -VANDANA     O2 Delivery Intra Treatment supplemental O2  -VANDANA     O2 Delivery Post Treatment supplemental O2  -VANDANA     Pre Patient Position Supine  -VANDANA     Intra Patient Position Standing  -VANDANA     Post Patient Position Sitting  -VANDANA     Row Name 04/02/23 1410          Positioning and Restraints    Pre-Treatment Position in bed  -VANDANA     Post Treatment Position chair  -VANDANA     In Chair reclined;call light within reach;exit alarm on;encouraged to call for assist;with family/caregiver;notified nsg;legs elevated  -VANDANA           User Key  (r) = Recorded By, (t) = Taken By, (c) = Cosigned By    Initials Name Provider Type    Aundrea Schmidt, PT Physical Therapist               Outcome Measures     Row Name 04/02/23 1415          How much help from another person do you currently need...    Turning from your back to your side while in flat bed without using bedrails? 4  -VANDANA     Moving from lying on back to sitting on the  side of a flat bed without bedrails? 4  -VANDANA     Moving to and from a bed to a chair (including a wheelchair)? 3  -VANDANA     Standing up from a chair using your arms (e.g., wheelchair, bedside chair)? 3  -VANDANA     Climbing 3-5 steps with a railing? 2  -VANDANA     To walk in hospital room? 2  -VANDANA     AM-PAC 6 Clicks Score (PT) 18  -VANDANA     Highest level of mobility 6 --> Walked 10 steps or more  -     Row Name 04/02/23 1415          Functional Assessment    Outcome Measure Options AM-PAC 6 Clicks Basic Mobility (PT)  -           User Key  (r) = Recorded By, (t) = Taken By, (c) = Cosigned By    Initials Name Provider Type    Aundrea Schmidt PT Physical Therapist                             Physical Therapy Education     Title: PT OT SLP Therapies (Done)     Topic: Physical Therapy (Done)     Point: Mobility training (Done)     Learning Progress Summary           Patient Acceptance, E,TB, VU by VANDANA at 4/2/2023 1416   Family Acceptance, E,TB, VU by VANDANA at 4/2/2023 1416                   Point: Home exercise program (Done)     Learning Progress Summary           Patient Acceptance, E,TB, VU by VANDANA at 4/2/2023 1416   Family Acceptance, E,TB, VU by VANDANA at 4/2/2023 1416                   Point: Body mechanics (Done)     Learning Progress Summary           Patient Acceptance, E,TB, VU by VANDANA at 4/2/2023 1416   Family Acceptance, E,TB, VU by VANDANA at 4/2/2023 1416                   Point: Precautions (Done)     Learning Progress Summary           Patient Acceptance, E,TB, VU by VANDANA at 4/2/2023 1416   Family Acceptance, E,TB, VU by  at 4/2/2023 1416                               User Key     Initials Effective Dates Name Provider Type Discipline    VANDANA 05/19/21 -  Aundrea Jimenez, KATERINA Physical Therapist PT              PT Recommendation and Plan  Planned Therapy Interventions (PT): balance training, bed mobility training, gait training, home exercise program, postural re-education, patient/family education, neuromuscular  re-education, strengthening, transfer training  Plan of Care Reviewed With: patient, son  Progress: improving  Outcome Evaluation: Ms. Watts is an 83 y/o F with PMH including but not limited to HTN, hypothyroidism, and depression who was admitted to [facility] on 4/1/23 for dysuria and frequency for the last week. She is accompained by her son who asssit with hx. Prior to hospital admission, she resided in independent living facility where her meals were delivered. Son reports patient very sedentary and decreased independence over the last several months and concerned regarding to ability to return to current facility. She presents with generalized weakness, limited activity tolerance due to fatigue with minimal activity, dizziness (hx of vertigo), and decreased functional ability. She required SBA for supine-sit, Page for sit-stand and Page to walk 5' from bed-chair. Patient with mild unsteadiness and quick to fatigue. She would benefit from skilled PT during her hospital stay to address previoulsy mentioned deficits prior to recommended d/c to rehab.     Time Calculation:    PT Charges     Row Name 04/02/23 1416             Time Calculation    Start Time 1105  -VANDANA      Stop Time 1132  -VANDANA      Time Calculation (min) 27 min  -VANDANA      PT Received On 04/02/23  -VANDANA      PT - Next Appointment 04/03/23  -VANDANA      PT Goal Re-Cert Due Date 04/09/23  -VANDANA            User Key  (r) = Recorded By, (t) = Taken By, (c) = Cosigned By    Initials Name Provider Type    Aundrea Schmidt, PT Physical Therapist              Therapy Charges for Today     Code Description Service Date Service Provider Modifiers Qty    [id number]  PT EVAL MOD COMPLEXITY 1 4/2/2023 Aundrea Jimenez, PT GP 1    [id number]  PT THERAPEUTIC ACT EA 15 MIN 4/2/2023 Aundrea Jimenez, PT GP 2          PT G-Codes  Outcome Measure Options: AM-PAC 6 Clicks Basic Mobility (PT)  AM-PAC 6 Clicks Score (PT): 18  PT Discharge Summary  Anticipated  Discharge Disposition (PT): inpatient rehabilitation facility    Aundrea Jimenez, PT  4/2/2023

## 2023-04-02 NOTE — PLAN OF CARE
Goal Outcome Evaluation:      VSS throughout shift, CT abd/pelvis completed. Pt up to chair with PT

## 2023-04-02 NOTE — PROGRESS NOTES
"DAILY PROGRESS NOTE  [facility]    Patient Identification:  Name: Dee Watts  Age: 82 y.o.  Sex: female  :  1940  MRN: [id number]         Primary Care Physician: Susanne Sharma APRN    Subjective:  Interval History: She is still very weak.  She complains of some abdominal discomfort.    Objective:    Scheduled Meds:cetirizine, 10 mg, Oral, Daily  enoxaparin, 40 mg, Subcutaneous, Daily  escitalopram, 10 mg, Oral, Daily  [START ON 4/3/2023] levothyroxine, 100 mcg, Oral, Q48H  levothyroxine, 88 mcg, Oral, Every Other Day  nystatin, 1 application, Topical, Q12H  sodium chloride, 10 mL, Intravenous, Q12H  valsartan, 160 mg, Oral, Once  valsartan, 160 mg, Oral, Q24H  verapamil SR, 240 mg, Oral, Daily      Continuous Infusions:sodium chloride, 50 mL/hr, Last Rate: 50 mL/hr (23 1256)        Vital signs in last 24 hours:  Temp:  [98.3 °F (36.8 °C)-99.5 °F (37.5 °C)] 98.5 °F (36.9 °C)  Heart Rate:  [67-85] 76  Resp:  [15-20] 18  BP: (117-183)/() 154/86    Intake/Output:    Intake/Output Summary (Last 24 hours) at 2023 1340  Last data filed at 2023 2136  Gross per 24 hour   Intake 600 ml   Output --   Net 600 ml       Exam:  /86 (BP Location: Left arm, Patient Position: Sitting)   Pulse 76   Temp 98.5 °F (36.9 °C) (Oral)   Resp 18   Ht 152.4 cm (60\")   Wt 81.6 kg (179 lb 14.3 oz)   LMP  (LMP Unknown)   SpO2 94%   BMI 35.13 kg/m²     General Appearance:    Alert, cooperative, no distress   Head:    Normocephalic, without obvious abnormality, atraumatic   Eyes:       Throat:   Lips, tongue, gums normal   Neck:   Supple, symmetrical, trachea midline, no JVD   Lungs:     Clear to auscultation bilaterally, respirations unlabored   Chest Wall:    No tenderness or deformity    Heart:    Regular rate and rhythm, S1 and S2 normal, no murmur,no  Rub or gallop   Abdomen:     Soft, mildly tender, bowel sounds active, no masses, no organomegaly    Extremities:   Extremities " normal, atraumatic, no cyanosis or edema   Pulses:      Skin:   Skin is warm and dry,  no rashes or palpable lesions   Neurologic:  Generalized weakness      Lab Results (last 72 hours)     Procedure Component Value Units Date/Time    PTH, Intact [[id number]]  (Normal) Collected: 04/01/23 1803    Specimen: Blood Updated: 04/02/23 1324     PTH, Intact 51.1 pg/mL     Narrative:      Results may be falsely decreased if patient taking Biotin.      Blood Culture - Blood, Arm, Right [[id number]] Collected: 04/02/23 0631    Specimen: Blood from Arm, Right Updated: 04/02/23 0745    Blood Culture - Blood, Arm, Left [[id number]] Collected: 04/02/23 0637    Specimen: Blood from Arm, Left Updated: 04/02/23 0744    Vitamin B12 [[id number]]  (Normal) Collected: 04/02/23 0631    Specimen: Blood Updated: 04/02/23 0727     Vitamin B-12 235 pg/mL     Narrative:      Results may be falsely increased if patient taking Biotin.      TSH [[id number]]  (Normal) Collected: 04/02/23 0631    Specimen: Blood Updated: 04/02/23 0721     TSH 1.990 uIU/mL     Basic Metabolic Panel [[id number]]  (Abnormal) Collected: 04/02/23 0631    Specimen: Blood Updated: 04/02/23 0715     Glucose 98 mg/dL      BUN 20 mg/dL      Creatinine 0.95 mg/dL      Sodium 143 mmol/L      Potassium 3.9 mmol/L      Chloride 107 mmol/L      CO2 27.0 mmol/L      Calcium 10.0 mg/dL      BUN/Creatinine Ratio 21.1     Anion Gap 9.0 mmol/L      eGFR 59.2 mL/min/1.73     Narrative:      GFR Normal >60  Chronic Kidney Disease <60  Kidney Failure <15    The GFR formula is only valid for adults with stable renal function between ages 18 and 70.    CBC & Differential [[id number]]  (Abnormal) Collected: 04/02/23 0631    Specimen: Blood Updated: 04/02/23 0658    Narrative:      The following orders were created for panel order CBC & Differential.  Procedure                               Abnormality         Status                     ---------                               -----------          "------                     CBC Auto Differential[[id number]]        Abnormal            Final result                 Please view results for these tests on the individual orders.    CBC Auto Differential [[id number]]  (Abnormal) Collected: 04/02/23 0631    Specimen: Blood Updated: 04/02/23 0658     WBC 8.89 10*3/mm3      RBC 4.64 10*6/mm3      Hemoglobin 13.6 g/dL      Hematocrit 42.2 %      MCV 90.9 fL      MCH 29.3 pg      MCHC 32.2 g/dL      RDW 13.1 %      RDW-SD 43.1 fl      MPV 11.2 fL      Platelets 228 10*3/mm3      Neutrophil % 69.9 %      Lymphocyte % 18.4 %      Monocyte % 8.1 %      Eosinophil % 2.6 %      Basophil % 0.6 %      Immature Grans % 0.4 %      Neutrophils, Absolute 6.21 10*3/mm3      Lymphocytes, Absolute 1.64 10*3/mm3      Monocytes, Absolute 0.72 10*3/mm3      Eosinophils, Absolute 0.23 10*3/mm3      Basophils, Absolute 0.05 10*3/mm3      Immature Grans, Absolute 0.04 10*3/mm3      nRBC 0.0 /100 WBC     Procalcitonin [[id number]]  (Normal) Collected: 04/01/23 2045    Specimen: Blood Updated: 04/01/23 2221     Procalcitonin 0.05 ng/mL     Narrative:      As a Marker for Sepsis (Non-Neonates):    1. <0.5 ng/mL represents a low risk of severe sepsis and/or septic shock.  2. >2 ng/mL represents a high risk of severe sepsis and/or septic shock.    As a Marker for Lower Respiratory Tract Infections that require antibiotic therapy:    PCT on Admission    Antibiotic Therapy       6-12 Hrs later    >0.5                Strongly Recommended  >0.25 - <0.5        Recommended   0.1 - 0.25          Discouraged              Remeasure/reassess PCT  <0.1                Strongly Discouraged     Remeasure/reassess PCT    As 28 day mortality risk marker: \"Change in Procalcitonin Result\" (>80% or <=80%) if Day 0 (or Day 1) and Day 4 values are available. Refer to [email or web address]    Change in PCT <=80%  A decrease of PCT levels below or equal to 80% defines a positive change in PCT test result " representing a higher risk for 28-day all-cause mortality of patients diagnosed with severe sepsis for septic shock.    Change in PCT >80%  A decrease of PCT levels of more than 80% defines a negative change in PCT result representing a lower risk for 28-day all-cause mortality of patients diagnosed with severe sepsis or septic shock.       High Sensitivity Troponin T 2Hr [[id number]]  (Abnormal) Collected: 04/01/23 2045    Specimen: Blood Updated: 04/01/23 2116     HS Troponin T 12 ng/L      Troponin T Delta 0 ng/L     Narrative:      High Sensitive Troponin T Reference Range:  <10.0 ng/L- Negative Female for AMI  <15.0 ng/L- Negative Male for AMI  >=10 - Abnormal Female indicating possible myocardial injury.  >=15 - Abnormal Male indicating possible myocardial injury.   Clinicians would have to utilize clinical acumen, EKG, Troponin, and serial changes to determine if it is an Acute Myocardial Infarction or myocardial injury due to an underlying chronic condition.         Respiratory Panel PCR w/COVID-19(SARS-CoV-2) BRIAN/SRIDHAR/MARGUERITE/PAD/COR/MAD/KERI In-House, NP Swab in [place], 3-4 HR TAT - Swab, Nasopharynx [[id number]]  (Normal) Collected: 04/01/23 1918    Specimen: Swab from Nasopharynx Updated: 04/01/23 2026     ADENOVIRUS, PCR Not Detected     Coronavirus 229E Not Detected     Coronavirus HKU1 Not Detected     Coronavirus NL63 Not Detected     Coronavirus OC43 Not Detected     COVID19 Not Detected     Human Metapneumovirus Not Detected     Human Rhinovirus/Enterovirus Not Detected     Influenza A PCR Not Detected     Influenza B PCR Not Detected     Parainfluenza Virus 1 Not Detected     Parainfluenza Virus 2 Not Detected     Parainfluenza Virus 3 Not Detected     Parainfluenza Virus 4 Not Detected     RSV, PCR Not Detected     Bordetella pertussis pcr Not Detected     Bordetella parapertussis PCR Not Detected     Chlamydophila pneumoniae PCR Not Detected     Mycoplasma pneumo by PCR Not Detected    Narrative:       In the setting of a positive respiratory panel with a viral infection PLUS a negative procalcitonin without other underlying concern for bacterial infection, consider observing off antibiotics or discontinuation of antibiotics and continue supportive care. If the respiratory panel is positive for atypical bacterial infection (Bordetella pertussis, Chlamydophila pneumoniae, or Mycoplasma pneumoniae), consider antibiotic de-escalation to target atypical bacterial infection.    [place] Draw [[id number]] Collected: 04/01/23 1803    Specimen: Blood Updated: 04/01/23 1916    Narrative:      The following orders were created for panel order [place] Draw.  Procedure                               Abnormality         Status                     ---------                               -----------         ------                     Green Top (Gel)[[id number]]                                  Final result               Lavender Top[[id number]]                                     Final result               Gold Top - SST[[id number]]                                   Final result               Light Blue Top[[id number]]                                   Final result                 Please view results for these tests on the individual orders.    Lavender Top [[id number]] Collected: 04/01/23 1803    Specimen: Blood Updated: 04/01/23 1916     Extra Tube hold for add-on     Comment: Auto resulted       Gold Top - SST [[id number]] Collected: 04/01/23 1803    Specimen: Blood Updated: 04/01/23 1916     Extra Tube Hold for add-ons.     Comment: Auto resulted.       Light Blue Top [[id number]] Collected: 04/01/23 1803    Specimen: Blood Updated: 04/01/23 1916     Extra Tube Hold for add-ons.     Comment: Auto resulted       Green Top (Gel) [[id number]] Collected: 04/01/23 1803    Specimen: Blood Updated: 04/01/23 1916     Extra Tube Hold for add-ons.     Comment: Auto resulted.       Urinalysis, Microscopic Only - Urine, Catheter [[id number]]   (Abnormal) Collected: 04/01/23 1758    Specimen: Urine, Catheter Updated: 04/01/23 1902     RBC, UA None Seen /HPF      WBC, UA 3-5 /HPF      Bacteria, UA None Seen /HPF      Squamous Epithelial Cells, UA 3-6 /HPF      Hyaline Casts, UA 0-2 /LPF      Methodology Manual Light Microscopy    High Sensitivity Troponin T [[id number]]  (Abnormal) Collected: 04/01/23 1803    Specimen: Blood Updated: 04/01/23 1846     HS Troponin T 12 ng/L     Narrative:      High Sensitive Troponin T Reference Range:  <10.0 ng/L- Negative Female for AMI  <15.0 ng/L- Negative Male for AMI  >=10 - Abnormal Female indicating possible myocardial injury.  >=15 - Abnormal Male indicating possible myocardial injury.   Clinicians would have to utilize clinical acumen, EKG, Troponin, and serial changes to determine if it is an Acute Myocardial Infarction or myocardial injury due to an underlying chronic condition.         Urinalysis With Microscopic If Indicated (No Culture) - Urine, Catheter [[id number]]  (Abnormal) Collected: 04/01/23 1758    Specimen: Urine, Catheter Updated: 04/01/23 1844     Color, UA Yellow     Appearance, UA Clear     pH, UA 6.0     Specific Gravity, UA 1.029     Glucose, UA Negative     Ketones, UA Negative     Bilirubin, UA Negative     Blood, UA Negative     Protein,  mg/dL (2+)     Leuk Esterase, UA Trace     Nitrite, UA Negative     Urobilinogen, UA 1.0 E.U./dL    Comprehensive Metabolic Panel [[id number]]  (Abnormal) Collected: 04/01/23 1803    Specimen: Blood Updated: 04/01/23 1834     Glucose 101 mg/dL      BUN 23 mg/dL      Creatinine 1.09 mg/dL      Sodium 143 mmol/L      Potassium 3.7 mmol/L      Chloride 106 mmol/L      CO2 25.7 mmol/L      Calcium 11.0 mg/dL      Total Protein 7.2 g/dL      Albumin 4.3 g/dL      ALT (SGPT) 12 U/L      AST (SGOT) 12 U/L      Alkaline Phosphatase 56 U/L      Total Bilirubin 0.4 mg/dL      Globulin 2.9 gm/dL      A/G Ratio 1.5 g/dL      BUN/Creatinine Ratio 21.1     Anion Gap  11.3 mmol/L      eGFR 50.2 mL/min/1.73     Narrative:      GFR Normal >60  Chronic Kidney Disease <60  Kidney Failure <15    The GFR formula is only valid for adults with stable renal function between ages 18 and 70.    BNP [[id number]]  (Normal) Collected: 04/01/23 1803    Specimen: Blood Updated: 04/01/23 1833     proBNP 207.0 pg/mL     Narrative:      Among patients with dyspnea, NT-proBNP is highly sensitive for the detection of acute congestive heart failure. In addition NT-proBNP of <300 pg/ml effectively rules out acute congestive heart failure with 99% negative predictive value.    Results may be falsely decreased if patient taking Biotin.      CBC & Differential [[id number]]  (Abnormal) Collected: 04/01/23 1803    Specimen: Blood Updated: 04/01/23 1814    Narrative:      The following orders were created for panel order CBC & Differential.  Procedure                               Abnormality         Status                     ---------                               -----------         ------                     CBC Auto Differential[[id number]]        Abnormal            Final result                 Please view results for these tests on the individual orders.    CBC Auto Differential [[id number]]  (Abnormal) Collected: 04/01/23 1803    Specimen: Blood Updated: 04/01/23 1814     WBC 11.51 10*3/mm3      RBC 4.66 10*6/mm3      Hemoglobin 13.7 g/dL      Hematocrit 42.9 %      MCV 92.1 fL      MCH 29.4 pg      MCHC 31.9 g/dL      RDW 13.3 %      RDW-SD 45.6 fl      MPV 11.4 fL      Platelets 247 10*3/mm3      Neutrophil % 72.8 %      Lymphocyte % 16.6 %      Monocyte % 8.5 %      Eosinophil % 1.2 %      Basophil % 0.4 %      Immature Grans % 0.5 %      Neutrophils, Absolute 8.37 10*3/mm3      Lymphocytes, Absolute 1.91 10*3/mm3      Monocytes, Absolute 0.98 10*3/mm3      Eosinophils, Absolute 0.14 10*3/mm3      Basophils, Absolute 0.05 10*3/mm3      Immature Grans, Absolute 0.06 10*3/mm3      nRBC 0.0 /100 WBC      POC Glucose Once [[id number]]  (Normal) Collected: 04/01/23 1749    Specimen: Blood Updated: 04/01/23 1750     Glucose 92 mg/dL      Comment: Meter: [id number] : [id number] Leia BARRETO           Data Review:  Results from last 7 days   Lab Units 04/02/23  0631 04/01/23  1803   SODIUM mmol/L 143 143   POTASSIUM mmol/L 3.9 3.7   CHLORIDE mmol/L 107 106   CO2 mmol/L 27.0 25.7   BUN mg/dL 20 23   CREATININE mg/dL 0.95 1.09*   GLUCOSE mg/dL 98 101*   CALCIUM mg/dL 10.0 11.0*     Results from last 7 days   Lab Units 04/02/23  0631 04/01/23  1803   WBC 10*3/mm3 8.89 11.51*   HEMOGLOBIN g/dL 13.6 13.7   HEMATOCRIT % 42.2 42.9   PLATELETS 10*3/mm3 228 247     Results from last 7 days   Lab Units 04/02/23  0631   TSH uIU/mL 1.990         Lab Results   Lab Value Date/Time    TROPONINT 12 (H) 04/01/2023 2045    TROPONINT 12 (H) 04/01/2023 1803         Results from last 7 days   Lab Units 04/01/23  1803   ALK PHOS U/L 56   BILIRUBIN mg/dL 0.4   ALT (SGPT) U/L 12   AST (SGOT) U/L 12     Results from last 7 days   Lab Units 04/02/23  0631   TSH uIU/mL 1.990         Glucose   Date/Time Value Ref Range Status   04/01/2023 1749 92 70 - 130 mg/dL Final     Comment:     Meter: [id number] : [id number] Leia BARRETO           Past Medical History:   Diagnosis Date   • Allergic rhinitis    • Anxiety    • Asthma    • DDD (degenerative disc disease), lumbar    • Depression    • GERD (gastroesophageal reflux disease)    • H/O bone density study 07/03/2012   • History of diverticulosis    • Hypertension    • Hypothyroidism    • IBS (irritable bowel syndrome)    • Insomnia    • Lumbar canal stenosis    • Lumbosacral neuritis    • Migraines    • Mood disorder    • Osteoarthritis    • Osteoporosis    • Thyroid goiter    • Vitamin D deficiency        Assessment:  Active Hospital Problems    Diagnosis  POA   • **AMS (altered mental status) [R41.82]  Yes   • UTI (urinary tract infection) [N39.0]  Unknown   • Obesity, morbid  [E66.01]  Yes   • Hypertension [I10]  Yes   • Hypothyroidism [E03.9]  Yes   • Lumbar canal stenosis [M48.061]  Yes   • DDD (degenerative disc disease), lumbar [M51.36]  Yes   • Mood disorder (HCC) [F39]  Yes      Resolved Hospital Problems   No resolved problems to display.       Plan:  Will give some more IV fluids for hydration and stop the hydrochlorothiazide.  Check CT scan of abdomen pelvis stone protocol.  Urinalysis really did not look that bad to continue antibiotics and procalcitonin was normal.  We will not give any more antibiotics at this point.  We will get follow-up lab tomorrow and also check PTH level.    Eliezer Beach MD  4/2/2023  13:40 EDT

## 2023-04-02 NOTE — H&P
Patient Name:  Dee Watts  YOB: 1940  MRN:  [id number]  Admit Date:  4/1/2023  Patient Care Team:  Susanne Sharma APRN as PCP - General (Internal Medicine)  Pedro Sy MD as Surgeon (Orthopedic Surgery)  Keith Dorsey MD as Surgeon (Orthopedic Surgery)  Kira Beal MD (Dermatology)  Jadon Jones MD as Consulting Physician (Ophthalmology)  Edgar Tello MD as Consulting Physician (Otolaryngology)      Subjective   History Present Illness     Chief Complaint   Patient presents with   • Weakness - Generalized   • Urinary Frequency         History of Present Illness  Ms. Watts is a 82 y.o. hypertension, hypothyroidism, depression presenting with dysuria and frequency for the last week.  Family at bedside with most history as patient is confused about what is happened over the last week.  Apparently about a week ago patient started having dysuria and frequency and family recommended that she go to her primary care doctor, unfortunately they do not have any open appointments and recommended that she go to urgent care.  She did not end up going to an urgent care and her symptoms seem to be getting better.  However, on Friday when family saw patient she was more confused and when she did get better on Saturday they brought her to the emergency room.  Patient was having chills, nausea, vomiting, rigors.  No chest pain or difficulty breathing.    Review of Systems   Constitutional: Negative for chills and fever.   HENT: Negative for rhinorrhea and sore throat.    Respiratory: Negative for cough and shortness of breath.    Cardiovascular: Negative for chest pain and leg swelling.   Gastrointestinal: Positive for nausea and vomiting. Negative for abdominal pain, constipation and diarrhea.   Genitourinary: Positive for frequency and urgency. Negative for dysuria.   Musculoskeletal: Negative for back pain and myalgias.   Skin: Negative for rash.   Neurological: Negative for  dizziness and headaches.        Personal History     Past Medical History:   Diagnosis Date   • Allergic rhinitis    • Anxiety    • Asthma    • DDD (degenerative disc disease), lumbar    • Depression    • GERD (gastroesophageal reflux disease)    • H/O bone density study 2012   • History of diverticulosis    • Hypertension    • Hypothyroidism    • IBS (irritable bowel syndrome)    • Insomnia    • Lumbar canal stenosis    • Lumbosacral neuritis    • Migraines    • Mood disorder    • Osteoarthritis    • Osteoporosis    • Thyroid goiter    • Vitamin D deficiency      Past Surgical History:   Procedure Laterality Date   • ANKLE SURGERY Left    • BACK SURGERY N/A 2005   • COLONOSCOPY N/A 2006   • HEMORRHOIDECTOMY N/A 1981   • HYSTERECTOMY N/A 1972   • PAP SMEAR N/A 10/31/2003   • THYROID CYST EXCISION      DATE NOT SPECIFIED   • WRIST SURGERY      LATERALITY AND DATE NOT SPECIFIED     Family History   Problem Relation Age of Onset   • Cancer Other    • Hypertension Other    • Breast cancer Sister    • Breast cancer Cousin    • Ovarian cancer Neg Hx      Social History     Tobacco Use   • Smoking status: Former     Types: Cigarettes     Quit date: 1990     Years since quittin.2   • Smokeless tobacco: Never   Vaping Use   • Vaping Use: Never used   Substance Use Topics   • Alcohol use: Yes     Comment: 1 DRINK PER WEEK   • Drug use: Yes     Types: Other     Comment: Prescribed gabapentin     No current facility-administered medications on file prior to encounter.     Current Outpatient Medications on File Prior to Encounter   Medication Sig Dispense Refill   • albuterol sulfate  (90 Base) MCG/ACT inhaler Inhale 2 puffs Every 4 (Four) Hours As Needed for Wheezing. 18 g 6   • baclofen (LIORESAL) 20 MG tablet Take 0.5 tablets by mouth 3 (Three) Times a Day As Needed for Muscle Spasms. 60 tablet 0   • escitalopram (LEXAPRO) 20 MG tablet TAKE 1 TABLET BY MOUTH DAILY 90 tablet 1    • fexofenadine (ALLEGRA) 180 MG tablet Take 1 tablet by mouth Daily. 90 tablet 1   • gabapentin (NEURONTIN) 800 MG tablet Take 1 tablet by mouth Daily. 90 tablet 1   • ketoconazole (NIZORAL) 2 % cream Apply 1 application topically to the appropriate area as directed Daily. 60 g 0   • ketoconazole (NIZORAL) 2 % shampoo Apply to scalp 5 minutes before rinsing, apply 2-3 times per week 100 mL 0   • levothyroxine (SYNTHROID, LEVOTHROID) 100 MCG tablet TAKE 1 TABLET BY MOUTH DAILY 90 tablet 3   • levothyroxine (SYNTHROID, LEVOTHROID) 88 MCG tablet Take 1 tablet by mouth Every Other Day. 45 tablet 1   • nystatin-triamcinolone (MYCOLOG II) 737353-8.1 UNIT/GM-% cream Apply  topically to the appropriate area as directed 2 (Two) Times a Day. 60 g 9   • raloxifene (EVISTA) 60 MG tablet TAKE 1 TABLET BY MOUTH DAILY 90 tablet 3   • valsartan-hydrochlorothiazide (DIOVAN-HCT) 160-25 MG per tablet Take 1 tablet by mouth Daily. 90 tablet 3   • verapamil SR (CALAN-SR) 240 MG CR tablet TAKE 1 TABLET BY MOUTH DAILY 90 tablet 1     Allergies   Allergen Reactions   • Codeine Nausea And Vomiting   • Penicillins Itching and Rash   • Sulfa Antibiotics Itching and Rash       Objective    Objective     Vital Signs  Temp:  [98.3 °F (36.8 °C)-99.5 °F (37.5 °C)] 98.3 °F (36.8 °C)  Heart Rate:  [67-85] 73  Resp:  [15-18] 16  BP: (117-183)/() 117/86  SpO2:  [90 %-96 %] 96 %  on   ;   Device (Oxygen Therapy): room air  Body mass index is 35.13 kg/m².    Physical Exam  Constitutional:       General: She is not in acute distress.     Appearance: She is obese. She is ill-appearing (Chronically). She is not diaphoretic.   HENT:      Head: Normocephalic and atraumatic.      Mouth/Throat:      Mouth: Mucous membranes are moist.      Pharynx: Oropharynx is clear.   Eyes:      Extraocular Movements: Extraocular movements intact.      Conjunctiva/sclera: Conjunctivae normal.   Cardiovascular:      Rate and Rhythm: Normal rate and regular rhythm.       Heart sounds: No murmur heard.  Pulmonary:      Effort: Pulmonary effort is normal. No respiratory distress.      Comments: Diminished breath sounds bilaterally  Abdominal:      General: Abdomen is flat. Bowel sounds are normal. There is no distension.      Palpations: Abdomen is soft.      Tenderness: There is no abdominal tenderness. There is no rebound.   Musculoskeletal:         General: No swelling or deformity. Normal range of motion.   Skin:     General: Skin is warm and dry.   Neurological:      General: No focal deficit present.      Mental Status: She is alert and oriented to person, place, and time.      Sensory: No sensory deficit.      Motor: No weakness.      Comments: Patient is confused about what is happened over the last week.  Unable to give reliable history, which is very different from her baseline per family at bedside.         Results Review:  I reviewed the patient's new clinical results.  I reviewed the patient's new imaging results and agree with the interpretation.  I reviewed the patient's other test results and agree with the interpretation  I personally viewed and interpreted the patient's EKG/Telemetry data  Discussed with ED provider.    Lab Results (last 24 hours)     Procedure Component Value Units Date/Time    POC Glucose Once [[id number]]  (Normal) Collected: 04/01/23 1749    Specimen: Blood Updated: 04/01/23 1750     Glucose 92 mg/dL      Comment: Meter: [id number] : [id number] Leia BARRETO       Urinalysis With Microscopic If Indicated (No Culture) - Urine, Catheter [[id number]]  (Abnormal) Collected: 04/01/23 1758    Specimen: Urine, Catheter Updated: 04/01/23 1844     Color, UA Yellow     Appearance, UA Clear     pH, UA 6.0     Specific Gravity, UA 1.029     Glucose, UA Negative     Ketones, UA Negative     Bilirubin, UA Negative     Blood, UA Negative     Protein,  mg/dL (2+)     Leuk Esterase, UA Trace     Nitrite, UA Negative     Urobilinogen, UA 1.0 E.U./dL     Urinalysis, Microscopic Only - Urine, Catheter [[id number]]  (Abnormal) Collected: 04/01/23 1758    Specimen: Urine, Catheter Updated: 04/01/23 1902     RBC, UA None Seen /HPF      WBC, UA 3-5 /HPF      Bacteria, UA None Seen /HPF      Squamous Epithelial Cells, UA 3-6 /HPF      Hyaline Casts, UA 0-2 /LPF      Methodology Manual Light Microscopy    CBC & Differential [[id number]]  (Abnormal) Collected: 04/01/23 1803    Specimen: Blood Updated: 04/01/23 1814    Narrative:      The following orders were created for panel order CBC & Differential.  Procedure                               Abnormality         Status                     ---------                               -----------         ------                     CBC Auto Differential[[id number]]        Abnormal            Final result                 Please view results for these tests on the individual orders.    Comprehensive Metabolic Panel [[id number]]  (Abnormal) Collected: 04/01/23 1803    Specimen: Blood Updated: 04/01/23 1834     Glucose 101 mg/dL      BUN 23 mg/dL      Creatinine 1.09 mg/dL      Sodium 143 mmol/L      Potassium 3.7 mmol/L      Chloride 106 mmol/L      CO2 25.7 mmol/L      Calcium 11.0 mg/dL      Total Protein 7.2 g/dL      Albumin 4.3 g/dL      ALT (SGPT) 12 U/L      AST (SGOT) 12 U/L      Alkaline Phosphatase 56 U/L      Total Bilirubin 0.4 mg/dL      Globulin 2.9 gm/dL      A/G Ratio 1.5 g/dL      BUN/Creatinine Ratio 21.1     Anion Gap 11.3 mmol/L      eGFR 50.2 mL/min/1.73     Narrative:      GFR Normal >60  Chronic Kidney Disease <60  Kidney Failure <15    The GFR formula is only valid for adults with stable renal function between ages 18 and 70.    BNP [[id number]]  (Normal) Collected: 04/01/23 1803    Specimen: Blood Updated: 04/01/23 1833     proBNP 207.0 pg/mL     Narrative:      Among patients with dyspnea, NT-proBNP is highly sensitive for the detection of acute congestive heart failure. In addition NT-proBNP of <300 pg/ml  effectively rules out acute congestive heart failure with 99% negative predictive value.    Results may be falsely decreased if patient taking Biotin.      High Sensitivity Troponin T [[id number]]  (Abnormal) Collected: 04/01/23 1803    Specimen: Blood Updated: 04/01/23 1846     HS Troponin T 12 ng/L     Narrative:      High Sensitive Troponin T Reference Range:  <10.0 ng/L- Negative Female for AMI  <15.0 ng/L- Negative Male for AMI  >=10 - Abnormal Female indicating possible myocardial injury.  >=15 - Abnormal Male indicating possible myocardial injury.   Clinicians would have to utilize clinical acumen, EKG, Troponin, and serial changes to determine if it is an Acute Myocardial Infarction or myocardial injury due to an underlying chronic condition.         CBC Auto Differential [[id number]]  (Abnormal) Collected: 04/01/23 1803    Specimen: Blood Updated: 04/01/23 1814     WBC 11.51 10*3/mm3      RBC 4.66 10*6/mm3      Hemoglobin 13.7 g/dL      Hematocrit 42.9 %      MCV 92.1 fL      MCH 29.4 pg      MCHC 31.9 g/dL      RDW 13.3 %      RDW-SD 45.6 fl      MPV 11.4 fL      Platelets 247 10*3/mm3      Neutrophil % 72.8 %      Lymphocyte % 16.6 %      Monocyte % 8.5 %      Eosinophil % 1.2 %      Basophil % 0.4 %      Immature Grans % 0.5 %      Neutrophils, Absolute 8.37 10*3/mm3      Lymphocytes, Absolute 1.91 10*3/mm3      Monocytes, Absolute 0.98 10*3/mm3      Eosinophils, Absolute 0.14 10*3/mm3      Basophils, Absolute 0.05 10*3/mm3      Immature Grans, Absolute 0.06 10*3/mm3      nRBC 0.0 /100 WBC     Respiratory Panel PCR w/COVID-19(SARS-CoV-2) BRIAN/SRIDHAR/MARGUERITE/PAD/COR/MAD/KERI In-House, NP Swab in [facility], 3-4 HR TAT - Swab, Nasopharynx [[id number]]  (Normal) Collected: 04/01/23 1918    Specimen: Swab from Nasopharynx Updated: 04/01/23 2026     ADENOVIRUS, PCR Not Detected     Coronavirus 229E Not Detected     Coronavirus HKU1 Not Detected     Coronavirus NL63 Not Detected     Coronavirus OC43 Not Detected     COVID19  Not Detected     Human Metapneumovirus Not Detected     Human Rhinovirus/Enterovirus Not Detected     Influenza A PCR Not Detected     Influenza B PCR Not Detected     Parainfluenza Virus 1 Not Detected     Parainfluenza Virus 2 Not Detected     Parainfluenza Virus 3 Not Detected     Parainfluenza Virus 4 Not Detected     RSV, PCR Not Detected     Bordetella pertussis pcr Not Detected     Bordetella parapertussis PCR Not Detected     Chlamydophila pneumoniae PCR Not Detected     Mycoplasma pneumo by PCR Not Detected    Narrative:      In the setting of a positive respiratory panel with a viral infection PLUS a negative procalcitonin without other underlying concern for bacterial infection, consider observing off antibiotics or discontinuation of antibiotics and continue supportive care. If the respiratory panel is positive for atypical bacterial infection (Bordetella pertussis, Chlamydophila pneumoniae, or Mycoplasma pneumoniae), consider antibiotic de-escalation to target atypical bacterial infection.    High Sensitivity Troponin T 2Hr [[id number]]  (Abnormal) Collected: 04/01/23 2045    Specimen: Blood Updated: 04/01/23 2116     HS Troponin T 12 ng/L      Troponin T Delta 0 ng/L     Narrative:      High Sensitive Troponin T Reference Range:  <10.0 ng/L- Negative Female for AMI  <15.0 ng/L- Negative Male for AMI  >=10 - Abnormal Female indicating possible myocardial injury.  >=15 - Abnormal Male indicating possible myocardial injury.   Clinicians would have to utilize clinical acumen, EKG, Troponin, and serial changes to determine if it is an Acute Myocardial Infarction or myocardial injury due to an underlying chronic condition.         Procalcitonin [[id number]]  (Normal) Collected: 04/01/23 2045    Specimen: Blood Updated: 04/01/23 2221     Procalcitonin 0.05 ng/mL     Narrative:      As a Marker for Sepsis (Non-Neonates):    1. <0.5 ng/mL represents a low risk of severe sepsis and/or septic shock.  2. >2 ng/mL  "represents a high risk of severe sepsis and/or septic shock.    As a Marker for Lower Respiratory Tract Infections that require antibiotic therapy:    PCT on Admission    Antibiotic Therapy       6-12 Hrs later    >0.5                Strongly Recommended  >0.25 - <0.5        Recommended   0.1 - 0.25          Discouraged              Remeasure/reassess PCT  <0.1                Strongly Discouraged     Remeasure/reassess PCT    As 28 day mortality risk marker: \"Change in Procalcitonin Result\" (>80% or <=80%) if Day 0 (or Day 1) and Day 4 values are available. Refer to http://[email or web address] [email or web address]    Change in PCT <=80%  A decrease of PCT levels below or equal to 80% defines a positive change in PCT test result representing a higher risk for 28-day all-cause mortality of patients diagnosed with severe sepsis for septic shock.    Change in PCT >80%  A decrease of PCT levels of more than 80% defines a negative change in PCT result representing a lower risk for 28-day all-cause mortality of patients diagnosed with severe sepsis or septic shock.             Imaging Results (Last 24 Hours)     Procedure Component Value Units Date/Time    CT Head Without Contrast [[id number]] Collected: 04/01/23 2056     Updated: 04/01/23 2103    Narrative:      CT HEAD WITHOUT CONTRAST     HISTORY: Mental status changes     COMPARISON: None available.     TECHNIQUE: Axial CT imaging was obtained through the brain. No IV  contrast was administered.     FINDINGS:  No acute intracranial hemorrhage is seen. There is diffuse atrophy.  There is periventricular and deep white matter microangiopathic change.  There is no midline shift or mass effect. Calcified lesion overlying the  left frontal lobe near the vertex may represent a meningioma. Mucous  retention cyst is noted within the left maxillary sinus.       Impression:      No acute intracranial findings.     Radiation dose reduction techniques were utilized, including " automated  exposure control and exposure modulation based on body size.     This report was finalized on 4/1/2023 9:00 PM by Dr. Rox Jurado M.D.                 ECG 12 Lead Altered Mental Status   Preliminary Result   HEART RATE= 67  bpm   RR Interval= 896  ms   ME Interval= 233  ms   P Horizontal Axis= 19  deg   P Front Axis= 80  deg   QRSD Interval= 95  ms   QT Interval= 405  ms   QRS Axis= 21  deg   T Wave Axis= 36  deg   - ABNORMAL ECG -   Sinus rhythm   Prolonged ME interval   Low voltage, precordial leads   Electronically Signed By:    Date and Time of Study: 2023-04-01 19:25:23           Assessment/Plan     Active Hospital Problems    Diagnosis  POA   • **AMS (altered mental status) [R41.82]  Yes   • UTI (urinary tract infection) [N39.0]  Unknown   • Obesity, morbid [E66.01]  Yes   • Hypertension [I10]  Yes   • Hypothyroidism [E03.9]  Yes   • Lumbar canal stenosis [M48.061]  Yes   • DDD (degenerative disc disease), lumbar [M51.36]  Yes   • Mood disorder (HCC) [F39]  Yes      Resolved Hospital Problems   No resolved problems to display.     Urinary tract infection  -Urine culture pending, Blood cultures pending  -Continue Ceftriaxone    Acute encephalopathy  -Likely secondary to UTI  -Patient is currently alert and orient x3 and knows her medications, but her history telling is very jumbled and patient is off per family  -CT head with no acute findings  -TSH and B12 pending    Hypertension  -Was high in the emergency room, and lower on the floor  -We will hold home ARB, HCTZ, verapamil for now.  We will start when her blood pressure is more stable.    CKD 3A, stable, baseline    Hypothyroidism- continue home 188 mcg of Synthroid.  TSH pending.    Depression - continue home lexapro    Chronic pain - continue home baclofen      · I discussed the patient's findings and my recommendations with patient, family, nursing staff and ED provider.    VTE Prophylaxis - Lovenox 40 mg SC daily.  Code Status - Full  code.       Frank Bertrand MD  [facility]  04/01/23  23:05 EDT

## 2023-04-03 LAB
ANION GAP SERPL CALCULATED.3IONS-SCNC: 9.3 MMOL/L (ref 5–15)
BASOPHILS # BLD AUTO: 0.07 10*3/MM3 (ref 0–0.2)
BASOPHILS NFR BLD AUTO: 0.8 % (ref 0–1.5)
BUN SERPL-MCNC: 26 MG/DL (ref 8–23)
BUN/CREAT SERPL: 21.3 (ref 7–25)
CA-I BLD-MCNC: 5.5 MG/DL (ref 4.6–5.4)
CA-I SERPL ISE-MCNC: 1.38 MMOL/L (ref 1.15–1.35)
CALCIUM SPEC-SCNC: 9.8 MG/DL (ref 8.6–10.5)
CHLORIDE SERPL-SCNC: 106 MMOL/L (ref 98–107)
CO2 SERPL-SCNC: 23.7 MMOL/L (ref 22–29)
CREAT SERPL-MCNC: 1.22 MG/DL (ref 0.57–1)
DEPRECATED RDW RBC AUTO: 43.2 FL (ref 37–54)
EGFRCR SERPLBLD CKD-EPI 2021: 43.8 ML/MIN/1.73
EOSINOPHIL # BLD AUTO: 0.36 10*3/MM3 (ref 0–0.4)
EOSINOPHIL NFR BLD AUTO: 3.9 % (ref 0.3–6.2)
ERYTHROCYTE [DISTWIDTH] IN BLOOD BY AUTOMATED COUNT: 13 % (ref 12.3–15.4)
GLUCOSE SERPL-MCNC: 105 MG/DL (ref 65–99)
HCT VFR BLD AUTO: 39.4 % (ref 34–46.6)
HGB BLD-MCNC: 12.5 G/DL (ref 12–15.9)
IMM GRANULOCYTES # BLD AUTO: 0.06 10*3/MM3 (ref 0–0.05)
IMM GRANULOCYTES NFR BLD AUTO: 0.6 % (ref 0–0.5)
LYMPHOCYTES # BLD AUTO: 1.71 10*3/MM3 (ref 0.7–3.1)
LYMPHOCYTES NFR BLD AUTO: 18.4 % (ref 19.6–45.3)
MCH RBC QN AUTO: 29.3 PG (ref 26.6–33)
MCHC RBC AUTO-ENTMCNC: 31.7 G/DL (ref 31.5–35.7)
MCV RBC AUTO: 92.3 FL (ref 79–97)
MONOCYTES # BLD AUTO: 0.83 10*3/MM3 (ref 0.1–0.9)
MONOCYTES NFR BLD AUTO: 8.9 % (ref 5–12)
NEUTROPHILS NFR BLD AUTO: 6.25 10*3/MM3 (ref 1.7–7)
NEUTROPHILS NFR BLD AUTO: 67.4 % (ref 42.7–76)
NRBC BLD AUTO-RTO: 0.1 /100 WBC (ref 0–0.2)
PLATELET # BLD AUTO: 216 10*3/MM3 (ref 140–450)
PMV BLD AUTO: 11.9 FL (ref 6–12)
POTASSIUM SERPL-SCNC: 4 MMOL/L (ref 3.5–5.2)
RBC # BLD AUTO: 4.27 10*6/MM3 (ref 3.77–5.28)
SODIUM SERPL-SCNC: 139 MMOL/L (ref 136–145)
WBC NRBC COR # BLD: 9.28 10*3/MM3 (ref 3.4–10.8)

## 2023-04-03 PROCEDURE — 82330 ASSAY OF CALCIUM: CPT | Performed by: HOSPITALIST

## 2023-04-03 PROCEDURE — 85025 COMPLETE CBC W/AUTO DIFF WBC: CPT | Performed by: HOSPITALIST

## 2023-04-03 PROCEDURE — 96372 THER/PROPH/DIAG INJ SC/IM: CPT

## 2023-04-03 PROCEDURE — 80048 BASIC METABOLIC PNL TOTAL CA: CPT | Performed by: HOSPITALIST

## 2023-04-03 PROCEDURE — G0378 HOSPITAL OBSERVATION PER HR: HCPCS

## 2023-04-03 PROCEDURE — 25010000002 ENOXAPARIN PER 10 MG: Performed by: STUDENT IN AN ORGANIZED HEALTH CARE EDUCATION/TRAINING PROGRAM

## 2023-04-03 RX ADMIN — NYSTATIN 1 APPLICATION: 100000 CREAM TOPICAL at 08:32

## 2023-04-03 RX ADMIN — Medication 10 ML: at 08:32

## 2023-04-03 RX ADMIN — VERAPAMIL HYDROCHLORIDE 240 MG: 240 TABLET, FILM COATED, EXTENDED RELEASE ORAL at 08:32

## 2023-04-03 RX ADMIN — Medication 5 MG: at 20:14

## 2023-04-03 RX ADMIN — VALSARTAN 160 MG: 160 TABLET, FILM COATED ORAL at 08:31

## 2023-04-03 RX ADMIN — CETIRIZINE HYDROCHLORIDE 10 MG: 10 TABLET ORAL at 08:31

## 2023-04-03 RX ADMIN — NYSTATIN 1 APPLICATION: 100000 CREAM TOPICAL at 20:15

## 2023-04-03 RX ADMIN — ACETAMINOPHEN 650 MG: 325 TABLET ORAL at 20:14

## 2023-04-03 RX ADMIN — Medication 10 ML: at 20:17

## 2023-04-03 RX ADMIN — ENOXAPARIN SODIUM 40 MG: 100 INJECTION SUBCUTANEOUS at 08:31

## 2023-04-03 RX ADMIN — ESCITALOPRAM OXALATE 10 MG: 10 TABLET, FILM COATED ORAL at 08:32

## 2023-04-03 RX ADMIN — LEVOTHYROXINE SODIUM 100 MCG: 0.1 TABLET ORAL at 06:51

## 2023-04-03 RX ADMIN — ACETAMINOPHEN 650 MG: 325 TABLET ORAL at 03:37

## 2023-04-03 NOTE — PROGRESS NOTES
"DAILY PROGRESS NOTE  [facility]    Patient Identification:  Name: Dee Watts  Age: 82 y.o.  Sex: female  :  1940  MRN: [id number]         Primary Care Physician: Susanne Sharma APRN    Subjective:  Interval History: She is still very weak.  She feels better today.    Objective:    Scheduled Meds:cetirizine, 10 mg, Oral, Daily  enoxaparin, 40 mg, Subcutaneous, Daily  escitalopram, 10 mg, Oral, Daily  levothyroxine, 100 mcg, Oral, Q48H  levothyroxine, 88 mcg, Oral, Every Other Day  nystatin, 1 application, Topical, Q12H  sodium chloride, 10 mL, Intravenous, Q12H  valsartan, 160 mg, Oral, Once  valsartan, 160 mg, Oral, Q24H  verapamil SR, 240 mg, Oral, Daily      Continuous Infusions:sodium chloride, 50 mL/hr, Last Rate: 50 mL/hr (23 1859)        Vital signs in last 24 hours:  Temp:  [98.5 °F (36.9 °C)-98.9 °F (37.2 °C)] 98.6 °F (37 °C)  Heart Rate:  [70-77] 73  Resp:  [16-18] 18  BP: (110-164)/(58-78) 110/65    Intake/Output:    Intake/Output Summary (Last 24 hours) at 4/3/2023 1502  Last data filed at 4/3/2023 0831  Gross per 24 hour   Intake 1120 ml   Output 200 ml   Net 920 ml       Exam:  /65 (BP Location: Left arm, Patient Position: Sitting)   Pulse 73   Temp 98.6 °F (37 °C) (Oral)   Resp 18   Ht 152.4 cm (60\")   Wt 81.6 kg (179 lb 14.3 oz)   LMP  (LMP Unknown)   SpO2 96%   BMI 35.13 kg/m²     General Appearance:    Alert, cooperative, no distress   Head:    Normocephalic, without obvious abnormality, atraumatic   Eyes:       Throat:   Lips, tongue, gums normal   Neck:   Supple, symmetrical, trachea midline, no JVD   Lungs:     Clear to auscultation bilaterally, respirations unlabored   Chest Wall:    No tenderness or deformity    Heart:    Regular rate and rhythm, S1 and S2 normal, no murmur,no  Rub or gallop   Abdomen:     Soft, mildly tender, bowel sounds active, no masses, no organomegaly    Extremities:   Extremities normal, atraumatic, no cyanosis or " edema   Pulses:      Skin:   Skin is warm and dry,  no rashes or palpable lesions   Neurologic:  Generalized weakness      Lab Results (last 72 hours)     Procedure Component Value Units Date/Time    PTH, Intact [[id number]]  (Normal) Collected: 04/01/23 1803    Specimen: Blood Updated: 04/02/23 1324     PTH, Intact 51.1 pg/mL     Narrative:      Results may be falsely decreased if patient taking Biotin.      Blood Culture - Blood, Arm, Right [[id number]] Collected: 04/02/23 0631    Specimen: Blood from Arm, Right Updated: 04/02/23 0745    Blood Culture - Blood, Arm, Left [[id number]] Collected: 04/02/23 0637    Specimen: Blood from Arm, Left Updated: 04/02/23 0744    Vitamin B12 [[id number]]  (Normal) Collected: 04/02/23 0631    Specimen: Blood Updated: 04/02/23 0727     Vitamin B-12 235 pg/mL     Narrative:      Results may be falsely increased if patient taking Biotin.      TSH [[id number]]  (Normal) Collected: 04/02/23 0631    Specimen: Blood Updated: 04/02/23 0721     TSH 1.990 uIU/mL     Basic Metabolic Panel [[id number]]  (Abnormal) Collected: 04/02/23 0631    Specimen: Blood Updated: 04/02/23 0715     Glucose 98 mg/dL      BUN 20 mg/dL      Creatinine 0.95 mg/dL      Sodium 143 mmol/L      Potassium 3.9 mmol/L      Chloride 107 mmol/L      CO2 27.0 mmol/L      Calcium 10.0 mg/dL      BUN/Creatinine Ratio 21.1     Anion Gap 9.0 mmol/L      eGFR 59.2 mL/min/1.73     Narrative:      GFR Normal >60  Chronic Kidney Disease <60  Kidney Failure <15    The GFR formula is only valid for adults with stable renal function between ages 18 and 70.    CBC & Differential [[id number]]  (Abnormal) Collected: 04/02/23 0631    Specimen: Blood Updated: 04/02/23 0658    Narrative:      The following orders were created for panel order CBC & Differential.  Procedure                               Abnormality         Status                     ---------                               -----------         ------                     CBC Auto  "Differential[[id number]]        Abnormal            Final result                 Please view results for these tests on the individual orders.    CBC Auto Differential [[id number]]  (Abnormal) Collected: 04/02/23 0631    Specimen: Blood Updated: 04/02/23 0658     WBC 8.89 10*3/mm3      RBC 4.64 10*6/mm3      Hemoglobin 13.6 g/dL      Hematocrit 42.2 %      MCV 90.9 fL      MCH 29.3 pg      MCHC 32.2 g/dL      RDW 13.1 %      RDW-SD 43.1 fl      MPV 11.2 fL      Platelets 228 10*3/mm3      Neutrophil % 69.9 %      Lymphocyte % 18.4 %      Monocyte % 8.1 %      Eosinophil % 2.6 %      Basophil % 0.6 %      Immature Grans % 0.4 %      Neutrophils, Absolute 6.21 10*3/mm3      Lymphocytes, Absolute 1.64 10*3/mm3      Monocytes, Absolute 0.72 10*3/mm3      Eosinophils, Absolute 0.23 10*3/mm3      Basophils, Absolute 0.05 10*3/mm3      Immature Grans, Absolute 0.04 10*3/mm3      nRBC 0.0 /100 WBC     Procalcitonin [[id number]]  (Normal) Collected: 04/01/23 2045    Specimen: Blood Updated: 04/01/23 2221     Procalcitonin 0.05 ng/mL     Narrative:      As a Marker for Sepsis (Non-Neonates):    1. <0.5 ng/mL represents a low risk of severe sepsis and/or septic shock.  2. >2 ng/mL represents a high risk of severe sepsis and/or septic shock.    As a Marker for Lower Respiratory Tract Infections that require antibiotic therapy:    PCT on Admission    Antibiotic Therapy       6-12 Hrs later    >0.5                Strongly Recommended  >0.25 - <0.5        Recommended   0.1 - 0.25          Discouraged              Remeasure/reassess PCT  <0.1                Strongly Discouraged     Remeasure/reassess PCT    As 28 day mortality risk marker: \"Change in Procalcitonin Result\" (>80% or <=80%) if Day 0 (or Day 1) and Day 4 values are available. Refer to http://[email or web address]-pct-calculator.com    Change in PCT <=80%  A decrease of PCT levels below or equal to 80% defines a positive change in PCT test result representing a higher risk for 28-day " all-cause mortality of patients diagnosed with severe sepsis for septic shock.    Change in PCT >80%  A decrease of PCT levels of more than 80% defines a negative change in PCT result representing a lower risk for 28-day all-cause mortality of patients diagnosed with severe sepsis or septic shock.       High Sensitivity Troponin T 2Hr [[id number]]  (Abnormal) Collected: 04/01/23 2045    Specimen: Blood Updated: 04/01/23 2116     HS Troponin T 12 ng/L      Troponin T Delta 0 ng/L     Narrative:      High Sensitive Troponin T Reference Range:  <10.0 ng/L- Negative Female for AMI  <15.0 ng/L- Negative Male for AMI  >=10 - Abnormal Female indicating possible myocardial injury.  >=15 - Abnormal Male indicating possible myocardial injury.   Clinicians would have to utilize clinical acumen, EKG, Troponin, and serial changes to determine if it is an Acute Myocardial Infarction or myocardial injury due to an underlying chronic condition.         Respiratory Panel PCR w/COVID-19(SARS-CoV-2) BRIAN/SRIDHAR/MARGUERITE/PAD/COR/MAD/KERI In-House, NP Swab in [place], 3-4 HR TAT - Swab, Nasopharynx [[id number]]  (Normal) Collected: 04/01/23 1918    Specimen: Swab from Nasopharynx Updated: 04/01/23 2026     ADENOVIRUS, PCR Not Detected     Coronavirus 229E Not Detected     Coronavirus HKU1 Not Detected     Coronavirus NL63 Not Detected     Coronavirus OC43 Not Detected     COVID19 Not Detected     Human Metapneumovirus Not Detected     Human Rhinovirus/Enterovirus Not Detected     Influenza A PCR Not Detected     Influenza B PCR Not Detected     Parainfluenza Virus 1 Not Detected     Parainfluenza Virus 2 Not Detected     Parainfluenza Virus 3 Not Detected     Parainfluenza Virus 4 Not Detected     RSV, PCR Not Detected     Bordetella pertussis pcr Not Detected     Bordetella parapertussis PCR Not Detected     Chlamydophila pneumoniae PCR Not Detected     Mycoplasma pneumo by PCR Not Detected    Narrative:      In the setting of a positive  respiratory panel with a viral infection PLUS a negative procalcitonin without other underlying concern for bacterial infection, consider observing off antibiotics or discontinuation of antibiotics and continue supportive care. If the respiratory panel is positive for atypical bacterial infection (Bordetella pertussis, Chlamydophila pneumoniae, or Mycoplasma pneumoniae), consider antibiotic de-escalation to target atypical bacterial infection.    Phillips Draw [[id number]] Collected: 04/01/23 1803    Specimen: Blood Updated: 04/01/23 1916    Narrative:      The following orders were created for panel order Phillips Draw.  Procedure                               Abnormality         Status                     ---------                               -----------         ------                     Green Top (Gel)[[id number]]                                  Final result               Lavender Top[[id number]]                                     Final result               Gold Top - SST[[id number]]                                   Final result               Light Blue Top[[id number]]                                   Final result                 Please view results for these tests on the individual orders.    Lavender Top [[id number]] Collected: 04/01/23 1803    Specimen: Blood Updated: 04/01/23 1916     Extra Tube hold for add-on     Comment: Auto resulted       Gold Top - SST [[id number]] Collected: 04/01/23 1803    Specimen: Blood Updated: 04/01/23 1916     Extra Tube Hold for add-ons.     Comment: Auto resulted.       Light Blue Top [[id number]] Collected: 04/01/23 1803    Specimen: Blood Updated: 04/01/23 1916     Extra Tube Hold for add-ons.     Comment: Auto resulted       Green Top (Gel) [[id number]] Collected: 04/01/23 1803    Specimen: Blood Updated: 04/01/23 1916     Extra Tube Hold for add-ons.     Comment: Auto resulted.       Urinalysis, Microscopic Only - Urine, Catheter [[id number]]  (Abnormal) Collected: 04/01/23  1758    Specimen: Urine, Catheter Updated: 04/01/23 1902     RBC, UA None Seen /HPF      WBC, UA 3-5 /HPF      Bacteria, UA None Seen /HPF      Squamous Epithelial Cells, UA 3-6 /HPF      Hyaline Casts, UA 0-2 /LPF      Methodology Manual Light Microscopy    High Sensitivity Troponin T [[id number]]  (Abnormal) Collected: 04/01/23 1803    Specimen: Blood Updated: 04/01/23 1846     HS Troponin T 12 ng/L     Narrative:      High Sensitive Troponin T Reference Range:  <10.0 ng/L- Negative Female for AMI  <15.0 ng/L- Negative Male for AMI  >=10 - Abnormal Female indicating possible myocardial injury.  >=15 - Abnormal Male indicating possible myocardial injury.   Clinicians would have to utilize clinical acumen, EKG, Troponin, and serial changes to determine if it is an Acute Myocardial Infarction or myocardial injury due to an underlying chronic condition.         Urinalysis With Microscopic If Indicated (No Culture) - Urine, Catheter [[id number]]  (Abnormal) Collected: 04/01/23 1758    Specimen: Urine, Catheter Updated: 04/01/23 1844     Color, UA Yellow     Appearance, UA Clear     pH, UA 6.0     Specific Gravity, UA 1.029     Glucose, UA Negative     Ketones, UA Negative     Bilirubin, UA Negative     Blood, UA Negative     Protein,  mg/dL (2+)     Leuk Esterase, UA Trace     Nitrite, UA Negative     Urobilinogen, UA 1.0 E.U./dL    Comprehensive Metabolic Panel [[id number]]  (Abnormal) Collected: 04/01/23 1803    Specimen: Blood Updated: 04/01/23 1834     Glucose 101 mg/dL      BUN 23 mg/dL      Creatinine 1.09 mg/dL      Sodium 143 mmol/L      Potassium 3.7 mmol/L      Chloride 106 mmol/L      CO2 25.7 mmol/L      Calcium 11.0 mg/dL      Total Protein 7.2 g/dL      Albumin 4.3 g/dL      ALT (SGPT) 12 U/L      AST (SGOT) 12 U/L      Alkaline Phosphatase 56 U/L      Total Bilirubin 0.4 mg/dL      Globulin 2.9 gm/dL      A/G Ratio 1.5 g/dL      BUN/Creatinine Ratio 21.1     Anion Gap 11.3 mmol/L      eGFR 50.2  mL/min/1.73     Narrative:      GFR Normal >60  Chronic Kidney Disease <60  Kidney Failure <15    The GFR formula is only valid for adults with stable renal function between ages 18 and 70.    BNP [[id number]]  (Normal) Collected: 04/01/23 1803    Specimen: Blood Updated: 04/01/23 1833     proBNP 207.0 pg/mL     Narrative:      Among patients with dyspnea, NT-proBNP is highly sensitive for the detection of acute congestive heart failure. In addition NT-proBNP of <300 pg/ml effectively rules out acute congestive heart failure with 99% negative predictive value.    Results may be falsely decreased if patient taking Biotin.      CBC & Differential [[id number]]  (Abnormal) Collected: 04/01/23 1803    Specimen: Blood Updated: 04/01/23 1814    Narrative:      The following orders were created for panel order CBC & Differential.  Procedure                               Abnormality         Status                     ---------                               -----------         ------                     CBC Auto Differential[[id number]]        Abnormal            Final result                 Please view results for these tests on the individual orders.    CBC Auto Differential [[id number]]  (Abnormal) Collected: 04/01/23 1803    Specimen: Blood Updated: 04/01/23 1814     WBC 11.51 10*3/mm3      RBC 4.66 10*6/mm3      Hemoglobin 13.7 g/dL      Hematocrit 42.9 %      MCV 92.1 fL      MCH 29.4 pg      MCHC 31.9 g/dL      RDW 13.3 %      RDW-SD 45.6 fl      MPV 11.4 fL      Platelets 247 10*3/mm3      Neutrophil % 72.8 %      Lymphocyte % 16.6 %      Monocyte % 8.5 %      Eosinophil % 1.2 %      Basophil % 0.4 %      Immature Grans % 0.5 %      Neutrophils, Absolute 8.37 10*3/mm3      Lymphocytes, Absolute 1.91 10*3/mm3      Monocytes, Absolute 0.98 10*3/mm3      Eosinophils, Absolute 0.14 10*3/mm3      Basophils, Absolute 0.05 10*3/mm3      Immature Grans, Absolute 0.06 10*3/mm3      nRBC 0.0 /100 WBC     POC Glucose Once  [[id number]]  (Normal) Collected: 04/01/23 1749    Specimen: Blood Updated: 04/01/23 1750     Glucose 92 mg/dL      Comment: Meter: [id number] : [id number] Leia BARRETO           Data Review:  Results from last 7 days   Lab Units 04/03/23  0544 04/02/23  0631 04/01/23  1803   SODIUM mmol/L 139 143 143   POTASSIUM mmol/L 4.0 3.9 3.7   CHLORIDE mmol/L 106 107 106   CO2 mmol/L 23.7 27.0 25.7   BUN mg/dL 26* 20 23   CREATININE mg/dL 1.22* 0.95 1.09*   GLUCOSE mg/dL 105* 98 101*   CALCIUM mg/dL 9.8 10.0 11.0*     Results from last 7 days   Lab Units 04/03/23  0544 04/02/23  0631 04/01/23  1803   WBC 10*3/mm3 9.28 8.89 11.51*   HEMOGLOBIN g/dL 12.5 13.6 13.7   HEMATOCRIT % 39.4 42.2 42.9   PLATELETS 10*3/mm3 216 228 247     Results from last 7 days   Lab Units 04/02/23  0631   TSH uIU/mL 1.990         Lab Results   Lab Value Date/Time    TROPONINT 12 (H) 04/01/2023 2045    TROPONINT 12 (H) 04/01/2023 1803         Results from last 7 days   Lab Units 04/01/23  1803   ALK PHOS U/L 56   BILIRUBIN mg/dL 0.4   ALT (SGPT) U/L 12   AST (SGOT) U/L 12     Results from last 7 days   Lab Units 04/02/23  0631   TSH uIU/mL 1.990         Glucose   Date/Time Value Ref Range Status   04/01/2023 1749 92 70 - 130 mg/dL Final     Comment:     Meter: [id number] : [id number] Leia BARRETO           Past Medical History:   Diagnosis Date   • Allergic rhinitis    • Anxiety    • Asthma    • DDD (degenerative disc disease), lumbar    • Depression    • GERD (gastroesophageal reflux disease)    • H/O bone density study 07/03/2012   • History of diverticulosis    • Hypertension    • Hypothyroidism    • IBS (irritable bowel syndrome)    • Insomnia    • Lumbar canal stenosis    • Lumbosacral neuritis    • Migraines    • Mood disorder    • Osteoarthritis    • Osteoporosis    • Thyroid goiter    • Vitamin D deficiency        Assessment:  Active Hospital Problems    Diagnosis  POA   • **AMS (altered mental status) [R41.82]  Yes   •  Nausea and vomiting [R11.2]  Unknown   • Weakness [R53.1]  Unknown   • UTI (urinary tract infection) [N39.0]  Unknown   • Hypercalcemia [E83.52]  Yes   • Obesity, morbid [E66.01]  Yes   • Hypertension [I10]  Yes   • Hypothyroidism [E03.9]  Yes   • Lumbar canal stenosis [M48.061]  Yes   • DDD (degenerative disc disease), lumbar [M51.36]  Yes   • Mood disorder (HCC) [F39]  Yes      Resolved Hospital Problems   No resolved problems to display.       Plan:  Will give some more IV fluids for hydration and stop the hydrochlorothiazide.    Urinalysis really did not look that bad to continue antibiotics and procalcitonin was normal.  We will not give any more antibiotics at this point.  We will get follow-up lab tomorrow and PTH level is normal.  DC planning.  She is still very weak probably needs skilled nursing unit for rehab.    Eliezer Beach MD  4/3/2023  15:02 EDT

## 2023-04-03 NOTE — PLAN OF CARE
Problem: Fall Injury Risk  Goal: Absence of Fall and Fall-Related Injury  Outcome: Ongoing, Progressing  Intervention: Identify and Manage Contributors  Recent Flowsheet Documentation  Taken 4/2/2023 2200 by Michelle Kolb RN  Medication Review/Management: medications reviewed  Taken 4/2/2023 2000 by Michelle Kolb RN  Medication Review/Management: medications reviewed  Intervention: Promote Injury-Free Environment  Recent Flowsheet Documentation  Taken 4/2/2023 2200 by Michelle Kolb RN  Safety Promotion/Fall Prevention: safety round/check completed  Taken 4/2/2023 2000 by Michelle Kolb RN  Safety Promotion/Fall Prevention: safety round/check completed     Problem: Asthma Comorbidity  Goal: Maintenance of Asthma Control  Outcome: Ongoing, Progressing  Intervention: Maintain Asthma Symptom Control  Recent Flowsheet Documentation  Taken 4/2/2023 2200 by Michelle Kolb RN  Medication Review/Management: medications reviewed  Taken 4/2/2023 2000 by Michelle Kolb RN  Medication Review/Management: medications reviewed     Problem: Hypertension Comorbidity  Goal: Blood Pressure in Desired Range  Outcome: Ongoing, Progressing  Intervention: Maintain Blood Pressure Management  Recent Flowsheet Documentation  Taken 4/2/2023 2200 by Michelle Kolb RN  Medication Review/Management: medications reviewed  Taken 4/2/2023 2000 by Michelle Kolb RN  Medication Review/Management: medications reviewed     Problem: Pain Chronic (Persistent) (Comorbidity Management)  Goal: Acceptable Pain Control and Functional Ability  Outcome: Ongoing, Progressing  Intervention: Manage Persistent Pain  Recent Flowsheet Documentation  Taken 4/2/2023 2200 by Michelle Kolb RN  Medication Review/Management: medications reviewed  Taken 4/2/2023 2000 by Michelle Kolb RN  Medication Review/Management: medications reviewed  Intervention: Optimize Psychosocial Wellbeing  Recent Flowsheet  Documentation  Taken 4/2/2023 2110 by Michelle Kolb RN  Supportive Measures: active listening utilized  Diversional Activities: television  Family/Support System Care: self-care encouraged     Problem: Skin Injury Risk Increased  Goal: Skin Health and Integrity  Outcome: Ongoing, Progressing  Intervention: Optimize Skin Protection  Recent Flowsheet Documentation  Taken 4/2/2023 2200 by Michelle Kolb RN  Head of Bed (HOB) Positioning: HOB elevated  Taken 4/2/2023 2000 by Michelle Kolb RN  Head of Bed (HOB) Positioning: HOB at 20 degrees     Problem: Adult Inpatient Plan of Care  Goal: Plan of Care Review  Outcome: Ongoing, Progressing  Flowsheets (Taken 4/2/2023 2225)  Progress: improving  Plan of Care Reviewed With: patient  Goal: Patient-Specific Goal (Individualized)  Outcome: Ongoing, Progressing  Goal: Absence of Hospital-Acquired Illness or Injury  Outcome: Ongoing, Progressing  Intervention: Identify and Manage Fall Risk  Recent Flowsheet Documentation  Taken 4/2/2023 2200 by Michelle Kolb RN  Safety Promotion/Fall Prevention: safety round/check completed  Taken 4/2/2023 2000 by Michelle Kolb RN  Safety Promotion/Fall Prevention: safety round/check completed  Intervention: Prevent Skin Injury  Recent Flowsheet Documentation  Taken 4/2/2023 2200 by Michelle Kolb RN  Body Position:   supine   tilted  Taken 4/2/2023 2000 by Michelle Kolb RN  Body Position: supine  Intervention: Prevent and Manage VTE (Venous Thromboembolism) Risk  Recent Flowsheet Documentation  Taken 4/2/2023 2200 by Michelle Kolb RN  Activity Management: activity adjusted per tolerance  Taken 4/2/2023 2110 by Michelle Kolb RN  VTE Prevention/Management:   bilateral   sequential compression devices on  Taken 4/2/2023 2000 by Michelle Kolb RN  Activity Management: activity adjusted per tolerance  Intervention: Prevent Infection  Recent Flowsheet Documentation  Taken 4/2/2023 2200 by  Michelle Kolb, RN  Infection Prevention:   rest/sleep promoted   single patient room provided  Taken 4/2/2023 2000 by Michelle Kolb, RN  Infection Prevention:   rest/sleep promoted   single patient room provided  Goal: Optimal Comfort and Wellbeing  Outcome: Ongoing, Progressing  Intervention: Provide Person-Centered Care  Recent Flowsheet Documentation  Taken 4/2/2023 2110 by Michelle Kolb, RN  Trust Relationship/Rapport:   care explained   questions encouraged   questions answered  Goal: Readiness for Transition of Care  Outcome: Ongoing, Progressing     Problem: Nausea and Vomiting  Goal: Fluid and Electrolyte Balance  Outcome: Ongoing, Progressing   Goal Outcome Evaluation:  Plan of Care Reviewed With: patient        Progress: improving

## 2023-04-03 NOTE — PLAN OF CARE
Goal Outcome Evaluation:      VSS throughout shift. Pt cognition improved this shift, alert and oriented x4.

## 2023-04-03 NOTE — DISCHARGE PLACEMENT REQUEST
"Dee Watts (82 y.o. Female)     Date of Birth   1940    Social Security Number       Address   [address] [place] [address]    Home Phone   [phone]    MRN   [id number]       Religious   None    Marital Status                               Admission Date   4/1/23    Admission Type   Emergency    Admitting Provider   Frank Bertrand MD    Attending Provider   Eliezer Beach MD    Department, Room/Bed   [address], P587/1       Discharge Date       Discharge Disposition       Discharge Destination                               Attending Provider: Eliezer Beach MD    Allergies: Codeine, Penicillins, Sulfa Antibiotics    Isolation: None   Infection: None   Code Status: CPR    Ht: 152.4 cm (60\")   Wt: 81.6 kg (179 lb 14.3 oz)    Admission Cmt: None   Principal Problem: AMS (altered mental status) [R41.82]                 Active Insurance as of 4/1/2023     Primary Coverage     Payor Plan Insurance Group Employer/Plan Group    HUMANA MEDICARE REPLACEMENT HU[employer] MEDICARE REPLACEMENT [id number]     Payor Plan Address Payor Plan Phone Number Payor Plan Fax Number Effective Dates    [address] [address] [phone]  1/1/2013 - None Entered    [facility] [address]       Subscriber Name Subscriber Birth Date Member ID       DEE WATTS 1940 [id number]                 Emergency Contacts      (Rel.) Home Phone Work Phone Mobile Phone    Myron Watts (Son) -- -- [phone]    Mercedes Watts -- -- [phone]              "

## 2023-04-03 NOTE — PROGRESS NOTES
Nutrition Services    Patient Name:  Dee Watts  YOB: 1940  MRN: [id number]  Admit Date:  4/1/2023    Assessment Date:  04/03/23    Comment: Visited patient who was sitting up in chair. Her dgtr in law was present. Patient reports she has an appetite which is good since she has not had one for the past several weeks. Her DIL reports she has been ill and not eating much. Patient reports she had been eating less than 50% of her meals. She is A&O x4 but has had intermittent confusion. Patient was vomiting 1.5 days PTA and was admitted for AMS and dehydration-- currently getting IVF. Patient was unaware of 26# (12%) wt loss x 2 months. She reports she is allergic to preservatives and artificial sweeteners, so RD will not order any ONS. Per EMR and patient report, she has been eating at least 75% of her meals since she has been here. Plans for rehab at d/c will continue to follow    Patient meets ASPEN/AND criteria for nutrition dx of severe malnutriton of acute disease based on wt loss and energy intake      CLINICAL NUTRITION ASSESSMENT      Reason for Assessment MST score 2+     Diagnosis/Problem   AMS    Medical/Surgical History Past Medical History:   Diagnosis Date   • Allergic rhinitis    • Anxiety    • Asthma    • DDD (degenerative disc disease), lumbar    • Depression    • GERD (gastroesophageal reflux disease)    • H/O bone density study 07/03/2012   • History of diverticulosis    • Hypertension    • Hypothyroidism    • IBS (irritable bowel syndrome)    • Insomnia    • Lumbar canal stenosis    • Lumbosacral neuritis    • Migraines    • Mood disorder    • Osteoarthritis    • Osteoporosis    • Thyroid goiter    • Vitamin D deficiency        Past Surgical History:   Procedure Laterality Date   • ANKLE SURGERY Left 2010   • BACK SURGERY N/A 03/01/2005   • COLONOSCOPY N/A 12/22/2006   • HEMORRHOIDECTOMY N/A 01/01/1981   • HYSTERECTOMY N/A 01/01/1972   • PAP SMEAR N/A 10/31/2003   • THYROID CYST  "EXCISION      DATE NOT SPECIFIED   • WRIST SURGERY      LATERALITY AND DATE NOT SPECIFIED        Encounter Information        Nutrition History:  Visited patient who was sitting up in chair. Her dgtr in law was present. Patient reports she has an appetite which is good since she has not had one for the past several weeks. Her DIL reports she has been ill and not eating much. Patient reports she had been eating less than 50% of her meals. She is A&O x4 but has had intermittent confusion. Patient was vomiting 1.5 days PTA and was admitted for AMS and dehydration, currently getting IVF. Patient was unaware of 26# (12%) wt loss x 2 months. She reportds she is allergic to preservatives and artificial sweeteners, so RD will not order any ONS. Per EMR and patient report she has been eating at least 75% of her meals since she has been here. Plans for rehab at d/c will continue to follow   Food Preferences:    Supplements:    Factors Affecting Intake: decreased appetite     Anthropometrics        Current Height  Current Weight  BMI kg/m2 Height: 152.4 cm (60\")  Weight: 81.6 kg (179 lb 14.3 oz) (04/01/23 2300)  Body mass index is 35.13 kg/m².   Adjusted BMI (if applicable)        Admission Weight 179# (82 kg)        Ideal Body Weight (IBW) 100# (45.5 kg)    Adjusted IBW (if applicable)        Usual Body Weight (UBW) 205#   Weight Change/Trend Loss, Amount/Timeframe:  26# (12%) wt loss x 2 months       Weight History Wt Readings from Last 30 Encounters:   04/01/23 2300 81.6 kg (179 lb 14.3 oz)   04/01/23 1703 90.7 kg (200 lb)   04/01/23 1605 90.7 kg (200 lb)   02/20/23 1011 93 kg (205 lb)   09/14/22 0942 92.4 kg (203 lb 9.6 oz)   05/06/22 0957 95 kg (209 lb 6.4 oz)   12/28/21 1308 96.4 kg (212 lb 9.6 oz)   07/20/21 1530 95.3 kg (210 lb)   05/18/21 1513 95.9 kg (211 lb 6.4 oz)   10/30/20 1056 93 kg (205 lb)   12/05/19 1535 92.4 kg (203 lb 9.6 oz)   05/24/19 1730 93.4 kg (205 lb 12.8 oz)   07/23/18 1601 93.6 kg (206 lb 6.4 oz) " "  02/21/18 1321 91.6 kg (202 lb)   02/06/18 1357 91.6 kg (202 lb)   01/31/18 1319 91.6 kg (202 lb)   01/18/18 1318 91.9 kg (202 lb 9.6 oz)   04/11/17 1338 97.5 kg (215 lb)   04/23/13 1433 93 kg (205 lb 0.1 oz)   04/09/13 1228 93.3 kg (205 lb 12.8 oz)           --  Estimated/Assessed Needs       Energy Requirements    Height for Calculation  Height: 152.4 cm (60\")   Weight for Calculation 179# (82 kg)   Method for Estimation  22 kcal/kg, 25 kcal/kg   EST Needs (kcal/day) 7065-4212       Protein Requirements    Weight for Calculation 179# (82 kg)   EST Protein Needs (g/kg) 0.8 gm/kg, 1.0 gm/kg   EST Daily Needs (g/day) 66-82       Fluid Requirements     Method for Estimation 1 mL/kcal    Estimated Needs (mL/day) 7677-3720     Tests/Procedures        Tests/Procedures CT scan     Labs       Pertinent Labs    Results from last 7 days   Lab Units 04/03/23  0544 04/02/23  0631 04/01/23  1803   SODIUM mmol/L 139 143 143   POTASSIUM mmol/L 4.0 3.9 3.7   CHLORIDE mmol/L 106 107 106   CO2 mmol/L 23.7 27.0 25.7   BUN mg/dL 26* 20 23   CREATININE mg/dL 1.22* 0.95 1.09*   CALCIUM mg/dL 9.8 10.0 11.0*   BILIRUBIN mg/dL  --   --  0.4   ALK PHOS U/L  --   --  56   ALT (SGPT) U/L  --   --  12   AST (SGOT) U/L  --   --  12   GLUCOSE mg/dL 105* 98 101*     Results from last 7 days   Lab Units 04/03/23  0544 04/02/23  0631 04/01/23  1803   MAGNESIUM mg/dL  --  2.3  --    PHOSPHORUS mg/dL  --  4.1  --    HEMOGLOBIN g/dL 12.5 13.6 13.7   HEMATOCRIT % 39.4 42.2 42.9   WBC 10*3/mm3 9.28 8.89 11.51*   ALBUMIN g/dL  --   --  4.3     Results from last 7 days   Lab Units 04/03/23  0544 04/02/23  0631 04/01/23  1803   PLATELETS 10*3/mm3 216 228 247     COVID19   Date Value Ref Range Status   04/01/2023 Not Detected Not Detected - Ref. Range Final     Lab Results   Component Value Date    HGBA1C 5.80 (H) 05/21/2021          Medications           Scheduled Medications cetirizine, 10 mg, Oral, Daily  enoxaparin, 40 mg, Subcutaneous, " Daily  escitalopram, 10 mg, Oral, Daily  levothyroxine, 100 mcg, Oral, Q48H  levothyroxine, 88 mcg, Oral, Every Other Day  nystatin, 1 application, Topical, Q12H  sodium chloride, 10 mL, Intravenous, Q12H  valsartan, 160 mg, Oral, Once  valsartan, 160 mg, Oral, Q24H  verapamil SR, 240 mg, Oral, Daily       Infusions sodium chloride, 50 mL/hr, Last Rate: 50 mL/hr (04/02/23 0601)       PRN Medications •  acetaminophen  •  albuterol  •  baclofen  •  melatonin  •  ondansetron **OR** ondansetron  •  [COMPLETED] Insert Peripheral IV **AND** sodium chloride  •  sodium chloride  •  sodium chloride     Physical Findings          Physical Appearance alert, obese, oriented   Oral/Mouth Cavity WNL   Edema  2+ (mild)   Gastrointestinal last bowel movement:3/31   Skin  skin intact   Tubes/Drains none   NFPE See Malnutrition Severity Assessment   --  Malnutrition Severity Assessment      Patient meets criteria for : Severe Malnutrition (Patient meets ASPEN/AND criteria for nutrition dx of severe malnutriton of acute disease based on wt loss and energy intake)  Malnutrition Type (last 8 hours)     Malnutrition Severity Assessment     Row Name 04/03/23 1158       Malnutrition Severity Assessment    Malnutrition Type Acute Disease or Injury - Related Malnutrition    Row Name 04/03/23 1158       Insufficient Energy Intake     Insufficient Energy Intake Findings Severe    Insufficient Energy Intake  < or equal to 50% of est. energy requirement for > or equal to 5d)    Row Name 04/03/23 1158       Unintentional Weight Loss     Unintentional Weight Loss Findings Severe  26# (12%) wt loss x 2 months    Unintentional Weight Loss  Weight loss greater than 7.5% in three months    Row Name 04/03/23 1158       Criteria Met (Must meet criteria for severity in at least 2 of these categories: M Wasting, Fat Loss, Fluid, Secondary Signs, Wt. Status, Intake)    Patient meets criteria for  Severe Malnutrition  Patient meets ASPEN/AND criteria for  nutrition dx of severe malnutriton of acute disease based on wt loss and energy intake                   Current Nutrition Orders & Evaluation of Intake       Oral Nutrition     Food Allergies NKFA   Current PO Diet Diet: Regular/House Diet; Texture: Regular Texture (IDDSI 7); Fluid Consistency: Thin (IDDSI 0)   Supplement n/a   PO Evaluation     % PO Intake %     # of Days Evaluated    --  PES STATEMENT / NUTRITION DIAGNOSIS      Nutrition Dx Problem  Problem: Unintentional Weight Loss  Etiology: Medical DiagnosisAMS, nausea/vomiting   Signs/Symptoms: Unintended Weight Change    Comment:    --  NUTRITION INTERVENTION / PLAN OF CARE      Intervention Goal(s) Reduce/improve symptoms, Meet estimated needs, Tolerate PO  and No significant weight loss         RD Intervention/Action Interview for preferences, Supplement offered/refused, Encourage intake, Follow Tx Progress and Care plan reviewed         Prescription/Orders:       PO Diet       Supplements       Snacks       Enteral Nutrition       Parenteral Nutrition    New Prescription Ordered? No changes at this time   --      Monitor/Evaluation Per protocol, I&O, PO intake, Pertinent labs, Weight, Skin status, GI status, Symptoms, POC/GOC   Discharge Plan/Needs Pending clinical course   Education Will instruct as appropriate   --    RD to follow per protocol.      Electronically signed by:  Megan Kitchen RD  04/03/23 14:49 EDT

## 2023-04-03 NOTE — CASE MANAGEMENT/SOCIAL WORK
Discharge Planning Assessment  [facility]     Patient Name: Dee Watts  MRN: [id number]  Today's Date: 4/3/2023    Admit Date: 4/1/2023    Plan: SNF referrals pending   Discharge Needs Assessment     Row Name 04/03/23 1519       Living Environment    People in Home facility resident    Current Living Arrangements independent living facility    Potentially Unsafe Housing Conditions none    Primary Care Provided by self    Provides Primary Care For no one    Family Caregiver if Needed child(onelia), adult    Family Caregiver Names SonMyron [phone]    Quality of Family Relationships helpful;involved;supportive       Resource/Environmental Concerns    Resource/Environmental Concerns none    Transportation Concerns none       Transition Planning    Patient/Family Anticipates Transition to inpatient rehabilitation facility    Patient/Family Anticipated Services at Transition skilled nursing    Transportation Anticipated family or friend will provide       Discharge Needs Assessment    Equipment Currently Used at Home cane, straight;wheelchair;walker, rolling;shower chair;nebulizer    Concerns to be Addressed discharge planning    Discharge Facility/Level of Care Needs nursing facility, skilled;rehabilitation facility               Discharge Plan     Row Name 04/03/23 1527       Plan    Plan SNF referrals pending    Patient/Family in Agreement with Plan yes    Plan Comments CCP spoke with patient at bedside; CCP role explained, face sheet verified, and discharge plan discussed. Patient is from [facility] and has meals delivered. Patient uses owns cane, rolling walker, wheelchair, shower chair, and nebulizer. Confirms pharmacy is [facility] on [facility]. Denies use of  services in the past. Patient has previously been to [facility] for rehab. PT recommending SNF. Patient agreeable to SNF and agreeable to referrals placed to higher rated facilities that are close to home.  Family will transport. CCP following for accepting facilities. Reema JOE LCSW              Continued Care and Services - Admitted Since 4/1/2023    Coordination has not been started for this encounter.       Expected Discharge Date and Time     Expected Discharge Date Expected Discharge Time    Apr 5, 2023          Demographic Summary     Row Name 04/03/23 1518       General Information    Admission Type observation    Arrived From home    Reason for Consult discharge planning    Preferred Language English               Functional Status     Row Name 04/03/23 1519       Functional Status    Usual Activity Tolerance moderate    Current Activity Tolerance poor       Functional Status, IADL    Medications assistive equipment    Meal Preparation assistive equipment    Housekeeping assistive equipment    Laundry assistive equipment    Shopping assistive equipment       Mental Status    General Appearance WDL WDL               Psychosocial    No documentation.                Abuse/Neglect    No documentation.                Legal    No documentation.                Substance Abuse    No documentation.                Patient Forms    No documentation.                   Reema Crawley

## 2023-04-04 PROCEDURE — G0378 HOSPITAL OBSERVATION PER HR: HCPCS

## 2023-04-04 PROCEDURE — 96372 THER/PROPH/DIAG INJ SC/IM: CPT

## 2023-04-04 PROCEDURE — 97116 GAIT TRAINING THERAPY: CPT

## 2023-04-04 PROCEDURE — 97110 THERAPEUTIC EXERCISES: CPT

## 2023-04-04 PROCEDURE — 25010000002 ENOXAPARIN PER 10 MG: Performed by: STUDENT IN AN ORGANIZED HEALTH CARE EDUCATION/TRAINING PROGRAM

## 2023-04-04 RX ORDER — POLYETHYLENE GLYCOL 3350 17 G/17G
17 POWDER, FOR SOLUTION ORAL DAILY
Status: DISCONTINUED | OUTPATIENT
Start: 2023-04-04 | End: 2023-04-05 | Stop reason: HOSPADM

## 2023-04-04 RX ORDER — BISACODYL 10 MG
10 SUPPOSITORY, RECTAL RECTAL DAILY PRN
Status: DISCONTINUED | OUTPATIENT
Start: 2023-04-04 | End: 2023-04-05 | Stop reason: HOSPADM

## 2023-04-04 RX ORDER — AMOXICILLIN 250 MG
2 CAPSULE ORAL 2 TIMES DAILY
Status: DISCONTINUED | OUTPATIENT
Start: 2023-04-04 | End: 2023-04-05 | Stop reason: HOSPADM

## 2023-04-04 RX ADMIN — VERAPAMIL HYDROCHLORIDE 240 MG: 240 TABLET, FILM COATED, EXTENDED RELEASE ORAL at 08:43

## 2023-04-04 RX ADMIN — VALSARTAN 160 MG: 160 TABLET, FILM COATED ORAL at 08:42

## 2023-04-04 RX ADMIN — ESCITALOPRAM OXALATE 10 MG: 10 TABLET, FILM COATED ORAL at 08:42

## 2023-04-04 RX ADMIN — ACETAMINOPHEN 650 MG: 325 TABLET ORAL at 08:59

## 2023-04-04 RX ADMIN — Medication 10 ML: at 21:14

## 2023-04-04 RX ADMIN — Medication 10 ML: at 08:43

## 2023-04-04 RX ADMIN — ENOXAPARIN SODIUM 40 MG: 100 INJECTION SUBCUTANEOUS at 08:42

## 2023-04-04 RX ADMIN — NYSTATIN 1 APPLICATION: 100000 CREAM TOPICAL at 21:14

## 2023-04-04 RX ADMIN — ACETAMINOPHEN 650 MG: 325 TABLET ORAL at 21:13

## 2023-04-04 RX ADMIN — LEVOTHYROXINE SODIUM 88 MCG: 88 TABLET ORAL at 06:10

## 2023-04-04 RX ADMIN — POLYETHYLENE GLYCOL 3350 17 G: 17 POWDER, FOR SOLUTION ORAL at 16:41

## 2023-04-04 RX ADMIN — Medication 5 MG: at 21:13

## 2023-04-04 RX ADMIN — DOCUSATE SODIUM 50MG AND SENNOSIDES 8.6MG 2 TABLET: 8.6; 5 TABLET, FILM COATED ORAL at 21:13

## 2023-04-04 RX ADMIN — CETIRIZINE HYDROCHLORIDE 10 MG: 10 TABLET ORAL at 08:42

## 2023-04-04 RX ADMIN — NYSTATIN 1 APPLICATION: 100000 CREAM TOPICAL at 08:42

## 2023-04-04 NOTE — PROGRESS NOTES
"DAILY PROGRESS NOTE  [facility]    Patient Identification:  Name: Dee Watts  Age: 82 y.o.  Sex: female  :  1940  MRN: [id number]         Primary Care Physician: Susanne Sharma APRN    Subjective:  Interval History: She is still very weak.  She feels better today.  She complains of constipation.    Objective:    Scheduled Meds:cetirizine, 10 mg, Oral, Daily  enoxaparin, 40 mg, Subcutaneous, Daily  escitalopram, 10 mg, Oral, Daily  levothyroxine, 100 mcg, Oral, Q48H  levothyroxine, 88 mcg, Oral, Every Other Day  nystatin, 1 application, Topical, Q12H  polyethylene glycol, 17 g, Oral, Daily  senna-docusate sodium, 2 tablet, Oral, BID  sodium chloride, 10 mL, Intravenous, Q12H  valsartan, 160 mg, Oral, Once  valsartan, 160 mg, Oral, Q24H  verapamil SR, 240 mg, Oral, Daily      Continuous Infusions:sodium chloride, 50 mL/hr, Last Rate: 50 mL/hr (23 1859)        Vital signs in last 24 hours:  Temp:  [97.9 °F (36.6 °C)-98.8 °F (37.1 °C)] 98.4 °F (36.9 °C)  Heart Rate:  [68-80] 79  Resp:  [16] 16  BP: (124-136)/(69-76) 136/69    Intake/Output:    Intake/Output Summary (Last 24 hours) at 2023 1650  Last data filed at 2023 0935  Gross per 24 hour   Intake 360 ml   Output 1450 ml   Net -1090 ml       Exam:  /69 (BP Location: Left arm, Patient Position: Sitting)   Pulse 79   Temp 98.4 °F (36.9 °C) (Oral)   Resp 16   Ht 152.4 cm (60\")   Wt 81.6 kg (179 lb 14.3 oz)   LMP  (LMP Unknown)   SpO2 94%   BMI 35.13 kg/m²     General Appearance:    Alert, cooperative, no distress   Head:    Normocephalic, without obvious abnormality, atraumatic   Eyes:       Throat:   Lips, tongue, gums normal   Neck:   Supple, symmetrical, trachea midline, no JVD   Lungs:     Clear to auscultation bilaterally, respirations unlabored   Chest Wall:    No tenderness or deformity    Heart:    Regular rate and rhythm, S1 and S2 normal, no murmur,no  Rub or gallop   Abdomen:     Soft, mildly " tender, bowel sounds active, no masses, no organomegaly    Extremities:   Extremities normal, atraumatic, no cyanosis or edema   Pulses:      Skin:   Skin is warm and dry,  no rashes or palpable lesions   Neurologic:  Generalized weakness      Lab Results (last 72 hours)     Procedure Component Value Units Date/Time    PTH, Intact [[id number]]  (Normal) Collected: 04/01/23 1803    Specimen: Blood Updated: 04/02/23 1324     PTH, Intact 51.1 pg/mL     Narrative:      Results may be falsely decreased if patient taking Biotin.      Blood Culture - Blood, Arm, Right [[id number]] Collected: 04/02/23 0631    Specimen: Blood from Arm, Right Updated: 04/02/23 0745    Blood Culture - Blood, Arm, Left [[id number]] Collected: 04/02/23 0637    Specimen: Blood from Arm, Left Updated: 04/02/23 0744    Vitamin B12 [[id number]]  (Normal) Collected: 04/02/23 0631    Specimen: Blood Updated: 04/02/23 0727     Vitamin B-12 235 pg/mL     Narrative:      Results may be falsely increased if patient taking Biotin.      TSH [[id number]]  (Normal) Collected: 04/02/23 0631    Specimen: Blood Updated: 04/02/23 0721     TSH 1.990 uIU/mL     Basic Metabolic Panel [[id number]]  (Abnormal) Collected: 04/02/23 0631    Specimen: Blood Updated: 04/02/23 0715     Glucose 98 mg/dL      BUN 20 mg/dL      Creatinine 0.95 mg/dL      Sodium 143 mmol/L      Potassium 3.9 mmol/L      Chloride 107 mmol/L      CO2 27.0 mmol/L      Calcium 10.0 mg/dL      BUN/Creatinine Ratio 21.1     Anion Gap 9.0 mmol/L      eGFR 59.2 mL/min/1.73     Narrative:      GFR Normal >60  Chronic Kidney Disease <60  Kidney Failure <15    The GFR formula is only valid for adults with stable renal function between ages 18 and 70.    CBC & Differential [[id number]]  (Abnormal) Collected: 04/02/23 0631    Specimen: Blood Updated: 04/02/23 0658    Narrative:      The following orders were created for panel order CBC & Differential.  Procedure                               Abnormality          "Status                     ---------                               -----------         ------                     CBC Auto Differential[[id number]]        Abnormal            Final result                 Please view results for these tests on the individual orders.    CBC Auto Differential [[id number]]  (Abnormal) Collected: 04/02/23 0631    Specimen: Blood Updated: 04/02/23 0658     WBC 8.89 10*3/mm3      RBC 4.64 10*6/mm3      Hemoglobin 13.6 g/dL      Hematocrit 42.2 %      MCV 90.9 fL      MCH 29.3 pg      MCHC 32.2 g/dL      RDW 13.1 %      RDW-SD 43.1 fl      MPV 11.2 fL      Platelets 228 10*3/mm3      Neutrophil % 69.9 %      Lymphocyte % 18.4 %      Monocyte % 8.1 %      Eosinophil % 2.6 %      Basophil % 0.6 %      Immature Grans % 0.4 %      Neutrophils, Absolute 6.21 10*3/mm3      Lymphocytes, Absolute 1.64 10*3/mm3      Monocytes, Absolute 0.72 10*3/mm3      Eosinophils, Absolute 0.23 10*3/mm3      Basophils, Absolute 0.05 10*3/mm3      Immature Grans, Absolute 0.04 10*3/mm3      nRBC 0.0 /100 WBC     Procalcitonin [[id number]]  (Normal) Collected: 04/01/23 2045    Specimen: Blood Updated: 04/01/23 2221     Procalcitonin 0.05 ng/mL     Narrative:      As a Marker for Sepsis (Non-Neonates):    1. <0.5 ng/mL represents a low risk of severe sepsis and/or septic shock.  2. >2 ng/mL represents a high risk of severe sepsis and/or septic shock.    As a Marker for Lower Respiratory Tract Infections that require antibiotic therapy:    PCT on Admission    Antibiotic Therapy       6-12 Hrs later    >0.5                Strongly Recommended  >0.25 - <0.5        Recommended   0.1 - 0.25          Discouraged              Remeasure/reassess PCT  <0.1                Strongly Discouraged     Remeasure/reassess PCT    As 28 day mortality risk marker: \"Change in Procalcitonin Result\" (>80% or <=80%) if Day 0 (or Day 1) and Day 4 values are available. Refer to [email or web address]    Change in PCT <=80%  A " decrease of PCT levels below or equal to 80% defines a positive change in PCT test result representing a higher risk for 28-day all-cause mortality of patients diagnosed with severe sepsis for septic shock.    Change in PCT >80%  A decrease of PCT levels of more than 80% defines a negative change in PCT result representing a lower risk for 28-day all-cause mortality of patients diagnosed with severe sepsis or septic shock.       High Sensitivity Troponin T 2Hr [[id number]]  (Abnormal) Collected: 04/01/23 2045    Specimen: Blood Updated: 04/01/23 2116     HS Troponin T 12 ng/L      Troponin T Delta 0 ng/L     Narrative:      High Sensitive Troponin T Reference Range:  <10.0 ng/L- Negative Female for AMI  <15.0 ng/L- Negative Male for AMI  >=10 - Abnormal Female indicating possible myocardial injury.  >=15 - Abnormal Male indicating possible myocardial injury.   Clinicians would have to utilize clinical acumen, EKG, Troponin, and serial changes to determine if it is an Acute Myocardial Infarction or myocardial injury due to an underlying chronic condition.         Respiratory Panel PCR w/COVID-19(SARS-CoV-2) BRIAN/SRIDHAR/MARGUERITE/PAD/COR/MAD/KERI In-House, NP Swab in UTM/VTM, 3-4 HR TAT - Swab, Nasopharynx [[id number]]  (Normal) Collected: 04/01/23 1918    Specimen: Swab from Nasopharynx Updated: 04/01/23 2026     ADENOVIRUS, PCR Not Detected     Coronavirus 229E Not Detected     Coronavirus HKU1 Not Detected     Coronavirus NL63 Not Detected     Coronavirus OC43 Not Detected     COVID19 Not Detected     Human Metapneumovirus Not Detected     Human Rhinovirus/Enterovirus Not Detected     Influenza A PCR Not Detected     Influenza B PCR Not Detected     Parainfluenza Virus 1 Not Detected     Parainfluenza Virus 2 Not Detected     Parainfluenza Virus 3 Not Detected     Parainfluenza Virus 4 Not Detected     RSV, PCR Not Detected     Bordetella pertussis pcr Not Detected     Bordetella parapertussis PCR Not Detected     Chlamydophila  pneumoniae PCR Not Detected     Mycoplasma pneumo by PCR Not Detected    Narrative:      In the setting of a positive respiratory panel with a viral infection PLUS a negative procalcitonin without other underlying concern for bacterial infection, consider observing off antibiotics or discontinuation of antibiotics and continue supportive care. If the respiratory panel is positive for atypical bacterial infection (Bordetella pertussis, Chlamydophila pneumoniae, or Mycoplasma pneumoniae), consider antibiotic de-escalation to target atypical bacterial infection.    [place] Draw [[id number]] Collected: 04/01/23 1803    Specimen: Blood Updated: 04/01/23 1916    Narrative:      The following orders were created for panel order [place] Draw.  Procedure                               Abnormality         Status                     ---------                               -----------         ------                     Green Top (Gel)[[id number]]                                  Final result               Lavender Top[[id number]]                                     Final result               Gold Top - SST[[id number]]                                   Final result               Light Blue Top[[id number]]                                   Final result                 Please view results for these tests on the individual orders.    Lavender Top [[id number]] Collected: 04/01/23 1803    Specimen: Blood Updated: 04/01/23 1916     Extra Tube hold for add-on     Comment: Auto resulted       Gold Top - SST [[id number]] Collected: 04/01/23 1803    Specimen: Blood Updated: 04/01/23 1916     Extra Tube Hold for add-ons.     Comment: Auto resulted.       Light Blue Top [[id number]] Collected: 04/01/23 1803    Specimen: Blood Updated: 04/01/23 1916     Extra Tube Hold for add-ons.     Comment: Auto resulted       Green Top (Gel) [[id number]] Collected: 04/01/23 1803    Specimen: Blood Updated: 04/01/23 1916     Extra Tube Hold for add-ons.      Comment: Auto resulted.       Urinalysis, Microscopic Only - Urine, Catheter [[id number]]  (Abnormal) Collected: 04/01/23 1758    Specimen: Urine, Catheter Updated: 04/01/23 1902     RBC, UA None Seen /HPF      WBC, UA 3-5 /HPF      Bacteria, UA None Seen /HPF      Squamous Epithelial Cells, UA 3-6 /HPF      Hyaline Casts, UA 0-2 /LPF      Methodology Manual Light Microscopy    High Sensitivity Troponin T [[id number]]  (Abnormal) Collected: 04/01/23 1803    Specimen: Blood Updated: 04/01/23 1846     HS Troponin T 12 ng/L     Narrative:      High Sensitive Troponin T Reference Range:  <10.0 ng/L- Negative Female for AMI  <15.0 ng/L- Negative Male for AMI  >=10 - Abnormal Female indicating possible myocardial injury.  >=15 - Abnormal Male indicating possible myocardial injury.   Clinicians would have to utilize clinical acumen, EKG, Troponin, and serial changes to determine if it is an Acute Myocardial Infarction or myocardial injury due to an underlying chronic condition.         Urinalysis With Microscopic If Indicated (No Culture) - Urine, Catheter [[id number]]  (Abnormal) Collected: 04/01/23 1758    Specimen: Urine, Catheter Updated: 04/01/23 1844     Color, UA Yellow     Appearance, UA Clear     pH, UA 6.0     Specific Gravity, UA 1.029     Glucose, UA Negative     Ketones, UA Negative     Bilirubin, UA Negative     Blood, UA Negative     Protein,  mg/dL (2+)     Leuk Esterase, UA Trace     Nitrite, UA Negative     Urobilinogen, UA 1.0 E.U./dL    Comprehensive Metabolic Panel [[id number]]  (Abnormal) Collected: 04/01/23 1803    Specimen: Blood Updated: 04/01/23 1834     Glucose 101 mg/dL      BUN 23 mg/dL      Creatinine 1.09 mg/dL      Sodium 143 mmol/L      Potassium 3.7 mmol/L      Chloride 106 mmol/L      CO2 25.7 mmol/L      Calcium 11.0 mg/dL      Total Protein 7.2 g/dL      Albumin 4.3 g/dL      ALT (SGPT) 12 U/L      AST (SGOT) 12 U/L      Alkaline Phosphatase 56 U/L      Total Bilirubin 0.4 mg/dL       Globulin 2.9 gm/dL      A/G Ratio 1.5 g/dL      BUN/Creatinine Ratio 21.1     Anion Gap 11.3 mmol/L      eGFR 50.2 mL/min/1.73     Narrative:      GFR Normal >60  Chronic Kidney Disease <60  Kidney Failure <15    The GFR formula is only valid for adults with stable renal function between ages 18 and 70.    BNP [[id number]]  (Normal) Collected: 04/01/23 1803    Specimen: Blood Updated: 04/01/23 1833     proBNP 207.0 pg/mL     Narrative:      Among patients with dyspnea, NT-proBNP is highly sensitive for the detection of acute congestive heart failure. In addition NT-proBNP of <300 pg/ml effectively rules out acute congestive heart failure with 99% negative predictive value.    Results may be falsely decreased if patient taking Biotin.      CBC & Differential [[id number]]  (Abnormal) Collected: 04/01/23 1803    Specimen: Blood Updated: 04/01/23 1814    Narrative:      The following orders were created for panel order CBC & Differential.  Procedure                               Abnormality         Status                     ---------                               -----------         ------                     CBC Auto Differential[[id number]]        Abnormal            Final result                 Please view results for these tests on the individual orders.    CBC Auto Differential [[id number]]  (Abnormal) Collected: 04/01/23 1803    Specimen: Blood Updated: 04/01/23 1814     WBC 11.51 10*3/mm3      RBC 4.66 10*6/mm3      Hemoglobin 13.7 g/dL      Hematocrit 42.9 %      MCV 92.1 fL      MCH 29.4 pg      MCHC 31.9 g/dL      RDW 13.3 %      RDW-SD 45.6 fl      MPV 11.4 fL      Platelets 247 10*3/mm3      Neutrophil % 72.8 %      Lymphocyte % 16.6 %      Monocyte % 8.5 %      Eosinophil % 1.2 %      Basophil % 0.4 %      Immature Grans % 0.5 %      Neutrophils, Absolute 8.37 10*3/mm3      Lymphocytes, Absolute 1.91 10*3/mm3      Monocytes, Absolute 0.98 10*3/mm3      Eosinophils, Absolute 0.14 10*3/mm3      Basophils,  Absolute 0.05 10*3/mm3      Immature Grans, Absolute 0.06 10*3/mm3      nRBC 0.0 /100 WBC     POC Glucose Once [[id number]]  (Normal) Collected: 04/01/23 1749    Specimen: Blood Updated: 04/01/23 1750     Glucose 92 mg/dL      Comment: Meter: [id number] : [id number] Leia BARRETO           Data Review:  Results from last 7 days   Lab Units 04/03/23  0544 04/02/23  0631 04/01/23  1803   SODIUM mmol/L 139 143 143   POTASSIUM mmol/L 4.0 3.9 3.7   CHLORIDE mmol/L 106 107 106   CO2 mmol/L 23.7 27.0 25.7   BUN mg/dL 26* 20 23   CREATININE mg/dL 1.22* 0.95 1.09*   GLUCOSE mg/dL 105* 98 101*   CALCIUM mg/dL 9.8 10.0 11.0*     Results from last 7 days   Lab Units 04/03/23  0544 04/02/23  0631 04/01/23  1803   WBC 10*3/mm3 9.28 8.89 11.51*   HEMOGLOBIN g/dL 12.5 13.6 13.7   HEMATOCRIT % 39.4 42.2 42.9   PLATELETS 10*3/mm3 216 228 247     Results from last 7 days   Lab Units 04/02/23  0631   TSH uIU/mL 1.990         Lab Results   Lab Value Date/Time    TROPONINT 12 (H) 04/01/2023 2045    TROPONINT 12 (H) 04/01/2023 1803         Results from last 7 days   Lab Units 04/01/23  1803   ALK PHOS U/L 56   BILIRUBIN mg/dL 0.4   ALT (SGPT) U/L 12   AST (SGOT) U/L 12     Results from last 7 days   Lab Units 04/02/23  0631   TSH uIU/mL 1.990         Glucose   Date/Time Value Ref Range Status   04/01/2023 1749 92 70 - 130 mg/dL Final     Comment:     Meter: [id number] : [id number] BARRETO           Past Medical History:   Diagnosis Date   • Allergic rhinitis    • Anxiety    • Asthma    • DDD (degenerative disc disease), lumbar    • Depression    • GERD (gastroesophageal reflux disease)    • H/O bone density study 07/03/2012   • History of diverticulosis    • Hypertension    • Hypothyroidism    • IBS (irritable bowel syndrome)    • Insomnia    • Lumbar canal stenosis    • Lumbosacral neuritis    • Migraines    • Mood disorder    • Osteoarthritis    • Osteoporosis    • Thyroid goiter    • Vitamin D deficiency         Assessment:  Active Hospital Problems    Diagnosis  POA   • **AMS (altered mental status) [R41.82]  Yes   • Nausea and vomiting [R11.2]  Unknown   • Weakness [R53.1]  Unknown   • UTI (urinary tract infection) [N39.0]  Unknown   • Hypercalcemia [E83.52]  Yes   • Obesity, morbid [E66.01]  Yes   • Hypertension [I10]  Yes   • Hypothyroidism [E03.9]  Yes   • Lumbar canal stenosis [M48.061]  Yes   • DDD (degenerative disc disease), lumbar [M51.36]  Yes   • Mood disorder (HCC) [F39]  Yes      Resolved Hospital Problems   No resolved problems to display.       Plan:  Will give some more IV fluids for hydration and stop the hydrochlorothiazide.    Urinalysis really did not look that bad to continue antibiotics and procalcitonin was normal.  We will not give any more antibiotics at this point.  We will get follow-up lab tomorrow and PTH level is normal.  DC planning.  She is still very weak probably needs skilled nursing unit for rehab.  Possibly could go to skilled nursing facility tomorrow if all arranged.  Rx for constipation.    Eliezer Beach MD  4/4/2023  16:50 EDT

## 2023-04-04 NOTE — PLAN OF CARE
Goal Outcome Evaluation:  Plan of Care Reviewed With: patient           Outcome Evaluation: Pt seen for PT this PM. Pt ambulated further distance today with cga using a walker. Pt completed LE exercises for strengthening. Plans SNF at d/c.

## 2023-04-04 NOTE — THERAPY TREATMENT NOTE
Patient Name: Dee Watts  : 1940    MRN: [id number]                              Today's Date: 2023       Admit Date: 2023    Visit Dx:     ICD-10-CM ICD-9-CM   1. Altered mental status, unspecified altered mental status type  R41.82 780.97   2. Weakness  R53.1 780.79   3. Dysuria  R30.0 788.1   4. Nausea and vomiting, unspecified vomiting type  R11.2 787.01   5. Hypertension, unspecified type  I10 401.9     Patient Active Problem List   Diagnosis   • DDD (degenerative disc disease), lumbar   • Osteoarthritis of hip   • Lumbosacral neuritis   • Lumbar canal stenosis   • Hypothyroidism   • Hypertension   • Mood disorder (HCC)   • Vitamin D deficiency   • IBS (irritable bowel syndrome)   • Osteoporosis   • Migraines   • Chronic breast pain   • Status post total hip replacement, right   • Mixed stress and urge urinary incontinence   • Allergic rhinitis   • Episodic paroxysmal hemicrania, not intractable   • Vertigo   • Obesity, morbid   • Subdural hematoma   • Hypercalcemia   • Chronic right-sided low back pain without sciatica   • Right-sided headache   • AMS (altered mental status)   • UTI (urinary tract infection)   • Nausea and vomiting   • Weakness     Past Medical History:   Diagnosis Date   • Allergic rhinitis    • Anxiety    • Asthma    • DDD (degenerative disc disease), lumbar    • Depression    • GERD (gastroesophageal reflux disease)    • H/O bone density study 2012   • History of diverticulosis    • Hypertension    • Hypothyroidism    • IBS (irritable bowel syndrome)    • Insomnia    • Lumbar canal stenosis    • Lumbosacral neuritis    • Migraines    • Mood disorder    • Osteoarthritis    • Osteoporosis    • Thyroid goiter    • Vitamin D deficiency      Past Surgical History:   Procedure Laterality Date   • ANKLE SURGERY Left    • BACK SURGERY N/A 2005   • COLONOSCOPY N/A 2006   • HEMORRHOIDECTOMY N/A 1981   • HYSTERECTOMY N/A 1972   • PAP SMEAR N/A  10/31/2003   • THYROID CYST EXCISION      DATE NOT SPECIFIED   • WRIST SURGERY      LATERALITY AND DATE NOT SPECIFIED      General Information     Row Name 04/04/23 1435          Physical Therapy Time and Intention    Document Type therapy note (daily note)  -CW     Mode of Treatment physical therapy;individual therapy  -CW     Row Name 04/04/23 1435          General Information    Patient Profile Reviewed yes  -CW     Existing Precautions/Restrictions fall  -CW     Row Name 04/04/23 1435          Cognition    Orientation Status (Cognition) oriented x 4  -CW     Row Name 04/04/23 1435          Safety Issues, Functional Mobility    Impairments Affecting Function (Mobility) endurance/activity tolerance;balance;strength;pain  -CW           User Key  (r) = Recorded By, (t) = Taken By, (c) = Cosigned By    Initials Name Provider Type    Myla Charles PT Physical Therapist               Mobility     Row Name 04/04/23 1436          Bed Mobility    Comment, (Bed Mobility) UIC at arrival and departure  -     Row Name 04/04/23 1436          Sit-Stand Transfer    Sit-Stand [place] (Transfers) contact guard;verbal cues;nonverbal cues (demo/gesture)  -CW     Assistive Device (Sit-Stand Transfers) walker, front-wheeled  -CW     Comment, (Sit-Stand Transfer) cues for hand placement  -CW     Row Name 04/04/23 1436          Gait/Stairs (Locomotion)    [place] Level (Gait) verbal cues  -CW     Assistive Device (Gait) walker, front-wheeled  -CW     Distance in Feet (Gait) 40'  -CW     Deviations/Abnormal Patterns (Gait) bilateral deviations;gait speed decreased;flakito decreased;base of support, wide;stride length decreased  -CW     Bilateral Gait Deviations forward flexed posture;heel strike decreased  -CW           User Key  (r) = Recorded By, (t) = Taken By, (c) = Cosigned By    Initials Name Provider Type    Myla Charles PT Physical Therapist               Obj/Interventions     Row Name 04/04/23 1436           Motor Skills    Therapeutic Exercise other (see comments)  B felecia pichardo, ana, steff  -CW     Row Name 04/04/23 1436          Balance    Balance Assessment standing dynamic balance;standing static balance  -CW     Static Standing Balance contact guard  -CW     Dynamic Standing Balance contact guard  -CW     Position/Device Used, Standing Balance walker, front-wheeled;supported  -CW           User Key  (r) = Recorded By, (t) = Taken By, (c) = Cosigned By    Initials Name Provider Type    Myla Charles, PT Physical Therapist               Goals/Plan    No documentation.                Clinical Impression     Row Name 04/04/23 1437          Pain    Pretreatment Pain Rating 0/10 - no pain  -CW     Row Name 04/04/23 1437          Plan of Care Review    Plan of Care Reviewed With patient  -CW     Outcome Evaluation Pt seen for PT this PM. Pt ambulated further distance today with cga using a walker. Pt completed LE exercises for strengthening. Plans SNF at d/c.  -CW           User Key  (r) = Recorded By, (t) = Taken By, (c) = Cosigned By    Initials Name Provider Type    Myla Charles, KATERINA Physical Therapist               Outcome Measures     Row Name 04/04/23 1438 04/04/23 0845       How much help from another person do you currently need...    Turning from your back to your side while in flat bed without using bedrails? 3  -CW 4  -JM    Moving from lying on back to sitting on the side of a flat bed without bedrails? 3  -CW 4  -JM    Moving to and from a bed to a chair (including a wheelchair)? 3  -CW 3  -JM    Standing up from a chair using your arms (e.g., wheelchair, bedside chair)? 3  -CW 3  -JM    Climbing 3-5 steps with a railing? 2  -CW 2  -JM    To walk in hospital room? 3  -CW 3  -JM    AM-PAC 6 Clicks Score (PT) 17  -CW 19  -JM    Highest level of mobility 5 --> Static standing  -CW 6 --> Walked 10 steps or more  -JM          User Key  (r) = Recorded By, (t) = Taken By, (c) = Cosigned By     Initials Name Provider Type    Esperanza Quach, RN Registered Nurse    CW Myla Guzman, PT Physical Therapist                             Physical Therapy Education     Title: PT OT SLP Therapies (Done)     Topic: Physical Therapy (Done)     Point: Mobility training (Done)     Learning Progress Summary           Patient Acceptance, E, VU by CW at 4/4/2023 1438    Acceptance, E,TB, VU by VANDANA at 4/2/2023 1416   Family Acceptance, E,TB, VU by VANDANA at 4/2/2023 1416                   Point: Home exercise program (Done)     Learning Progress Summary           Patient Acceptance, E, VU by CW at 4/4/2023 1438    Acceptance, E,TB, VU by VANDANA at 4/2/2023 1416   Family Acceptance, E,TB, VU by VANDANA at 4/2/2023 1416                   Point: Body mechanics (Done)     Learning Progress Summary           Patient Acceptance, E, VU by CW at 4/4/2023 1438    Acceptance, E,TB, VU by VANDANA at 4/2/2023 1416   Family Acceptance, E,TB, VU by VANDANA at 4/2/2023 1416                   Point: Precautions (Done)     Learning Progress Summary           Patient Acceptance, E, VU by CW at 4/4/2023 1438    Acceptance, E,TB, VU by VANDANA at 4/2/2023 1416   Family Acceptance, E,TB, VU by VANDANA at 4/2/2023 1416                               User Key     Initials Effective Dates Name Provider Type Discipline     05/19/21 -  Aundrea Jimenez, PT Physical Therapist PT     12/13/22 -  Myla Guzman, KATERINA Physical Therapist PT              PT Recommendation and Plan     Plan of Care Reviewed With: patient  Outcome Evaluation: Pt seen for PT this PM. Pt ambulated further distance today with cga using a walker. Pt completed LE exercises for strengthening. Plans SNF at d/c.     Time Calculation:    PT Charges     Row Name 04/04/23 1435             Time Calculation    Start Time 1347  -CW      Stop Time 1411  -CW      Time Calculation (min) 24 min  -CW      PT Received On 04/04/23  -CW      PT - Next Appointment 04/05/23  -CW            User Key  (r) =  Recorded By, (t) = Taken By, (c) = Cosigned By    Initials Name Provider Type    CW Myla Guzman, PT Physical Therapist              Therapy Charges for Today     Code Description Service Date Service Provider Modifiers Qty    89026601903 HC GAIT TRAINING EA 15 MIN 4/4/2023 Myla Guzman, PT GP 1    70090527005 HC PT THER PROC EA 15 MIN 4/4/2023 Myla Guzman, PT GP 1          PT G-Codes  Outcome Measure Options: AM-PAC 6 Clicks Basic Mobility (PT)  AM-PAC 6 Clicks Score (PT): 17       Myla Guzman, PT  4/4/2023

## 2023-04-04 NOTE — PROGRESS NOTES
Continued Stay Note  [facility]     Patient Name: Dee Watts  MRN: [id number]  Today's Date: 4/4/2023    Admit Date: 4/1/2023    Plan: [facility] skilled pending [employer] Medicare pre-cert, initiated today   Discharge Plan     Row Name 04/04/23 1315       Plan    Plan [facility] skilled pending [employer] Medicare pre-cert, initiated today    Patient/Family in Agreement with Plan yes    Plan Comments Per Ana [facility] can accept pt for skilled rehab pending [employer] Medicare pre-cert, initiated today. Pt and daughter in law updated at bedside and agreeable. Pharmacy updated and packet in CCP office. Seema Almaguer LCSW               Discharge Codes    No documentation.               Expected Discharge Date and Time     Expected Discharge Date Expected Discharge Time    Apr 5, 2023             Aundrea Almaguer LCSW

## 2023-04-05 VITALS
DIASTOLIC BLOOD PRESSURE: 84 MMHG | RESPIRATION RATE: 16 BRPM | OXYGEN SATURATION: 96 % | SYSTOLIC BLOOD PRESSURE: 149 MMHG | HEART RATE: 73 BPM | WEIGHT: 179.9 LBS | TEMPERATURE: 97.8 F | BODY MASS INDEX: 35.32 KG/M2 | HEIGHT: 60 IN

## 2023-04-05 LAB
ALBUMIN SERPL ELPH-MCNC: 3.6 G/DL (ref 2.9–4.4)
ALBUMIN/GLOB SERPL: 1.3 {RATIO} (ref 0.7–1.7)
ALPHA1 GLOB SERPL ELPH-MCNC: 0.2 G/DL (ref 0–0.4)
ALPHA2 GLOB SERPL ELPH-MCNC: 0.8 G/DL (ref 0.4–1)
ANION GAP SERPL CALCULATED.3IONS-SCNC: 10.2 MMOL/L (ref 5–15)
B-GLOBULIN SERPL ELPH-MCNC: 1.1 G/DL (ref 0.7–1.3)
BASOPHILS # BLD AUTO: 0.03 10*3/MM3 (ref 0–0.2)
BASOPHILS NFR BLD AUTO: 0.3 % (ref 0–1.5)
BUN SERPL-MCNC: 23 MG/DL (ref 8–23)
BUN/CREAT SERPL: 24.7 (ref 7–25)
CALCIUM SPEC-SCNC: 9.8 MG/DL (ref 8.6–10.5)
CHLORIDE SERPL-SCNC: 102 MMOL/L (ref 98–107)
CO2 SERPL-SCNC: 24.8 MMOL/L (ref 22–29)
CREAT SERPL-MCNC: 0.93 MG/DL (ref 0.57–1)
DEPRECATED RDW RBC AUTO: 41.4 FL (ref 37–54)
EGFRCR SERPLBLD CKD-EPI 2021: 60.7 ML/MIN/1.73
EOSINOPHIL # BLD AUTO: 0.44 10*3/MM3 (ref 0–0.4)
EOSINOPHIL NFR BLD AUTO: 5.1 % (ref 0.3–6.2)
ERYTHROCYTE [DISTWIDTH] IN BLOOD BY AUTOMATED COUNT: 12.4 % (ref 12.3–15.4)
GAMMA GLOB SERPL ELPH-MCNC: 0.9 G/DL (ref 0.4–1.8)
GLOBULIN SER-MCNC: 3 G/DL (ref 2.2–3.9)
GLUCOSE SERPL-MCNC: 114 MG/DL (ref 65–99)
HCT VFR BLD AUTO: 36.6 % (ref 34–46.6)
HGB BLD-MCNC: 12.1 G/DL (ref 12–15.9)
IGA SERPL-MCNC: 236 MG/DL (ref 64–422)
IGG SERPL-MCNC: 867 MG/DL (ref 586–1602)
IGM SERPL-MCNC: 191 MG/DL (ref 26–217)
IMM GRANULOCYTES # BLD AUTO: 0.06 10*3/MM3 (ref 0–0.05)
IMM GRANULOCYTES NFR BLD AUTO: 0.7 % (ref 0–0.5)
INTERPRETATION SERPL IEP-IMP: ABNORMAL
KAPPA LC FREE SER-MCNC: 30.8 MG/L (ref 3.3–19.4)
KAPPA LC FREE/LAMBDA FREE SER: 1.15 {RATIO} (ref 0.26–1.65)
LABORATORY COMMENT REPORT: ABNORMAL
LAMBDA LC FREE SERPL-MCNC: 26.7 MG/L (ref 5.7–26.3)
LYMPHOCYTES # BLD AUTO: 1.29 10*3/MM3 (ref 0.7–3.1)
LYMPHOCYTES NFR BLD AUTO: 15 % (ref 19.6–45.3)
M PROTEIN SERPL ELPH-MCNC: ABNORMAL G/DL
MCH RBC QN AUTO: 29.7 PG (ref 26.6–33)
MCHC RBC AUTO-ENTMCNC: 33.1 G/DL (ref 31.5–35.7)
MCV RBC AUTO: 89.9 FL (ref 79–97)
MONOCYTES # BLD AUTO: 0.74 10*3/MM3 (ref 0.1–0.9)
MONOCYTES NFR BLD AUTO: 8.6 % (ref 5–12)
NEUTROPHILS NFR BLD AUTO: 6.04 10*3/MM3 (ref 1.7–7)
NEUTROPHILS NFR BLD AUTO: 70.3 % (ref 42.7–76)
NRBC BLD AUTO-RTO: 0 /100 WBC (ref 0–0.2)
PLATELET # BLD AUTO: 177 10*3/MM3 (ref 140–450)
PMV BLD AUTO: 11.5 FL (ref 6–12)
POTASSIUM SERPL-SCNC: 4.6 MMOL/L (ref 3.5–5.2)
PROT SERPL-MCNC: 6.6 G/DL (ref 6–8.5)
RBC # BLD AUTO: 4.07 10*6/MM3 (ref 3.77–5.28)
SODIUM SERPL-SCNC: 137 MMOL/L (ref 136–145)
WBC NRBC COR # BLD: 8.6 10*3/MM3 (ref 3.4–10.8)

## 2023-04-05 PROCEDURE — 97530 THERAPEUTIC ACTIVITIES: CPT

## 2023-04-05 PROCEDURE — G0378 HOSPITAL OBSERVATION PER HR: HCPCS

## 2023-04-05 PROCEDURE — 96372 THER/PROPH/DIAG INJ SC/IM: CPT

## 2023-04-05 PROCEDURE — 85025 COMPLETE CBC W/AUTO DIFF WBC: CPT | Performed by: HOSPITALIST

## 2023-04-05 PROCEDURE — 25010000002 ENOXAPARIN PER 10 MG: Performed by: STUDENT IN AN ORGANIZED HEALTH CARE EDUCATION/TRAINING PROGRAM

## 2023-04-05 PROCEDURE — 80048 BASIC METABOLIC PNL TOTAL CA: CPT | Performed by: HOSPITALIST

## 2023-04-05 PROCEDURE — 97116 GAIT TRAINING THERAPY: CPT

## 2023-04-05 RX ORDER — BISACODYL 10 MG
10 SUPPOSITORY, RECTAL RECTAL DAILY PRN
Start: 2023-04-05

## 2023-04-05 RX ORDER — POLYETHYLENE GLYCOL 3350 17 G/17G
17 POWDER, FOR SOLUTION ORAL DAILY
Start: 2023-04-06

## 2023-04-05 RX ORDER — VALSARTAN 160 MG/1
160 TABLET ORAL
Start: 2023-04-06

## 2023-04-05 RX ORDER — CHOLECALCIFEROL (VITAMIN D3) 125 MCG
5 CAPSULE ORAL NIGHTLY PRN
Start: 2023-04-05

## 2023-04-05 RX ORDER — ACETAMINOPHEN 325 MG/1
650 TABLET ORAL EVERY 4 HOURS PRN
Start: 2023-04-05

## 2023-04-05 RX ORDER — AMOXICILLIN 250 MG
2 CAPSULE ORAL 2 TIMES DAILY
Start: 2023-04-05

## 2023-04-05 RX ORDER — NYSTATIN 100000 U/G
1 CREAM TOPICAL EVERY 12 HOURS SCHEDULED
Start: 2023-04-05

## 2023-04-05 RX ADMIN — VALSARTAN 160 MG: 160 TABLET, FILM COATED ORAL at 08:48

## 2023-04-05 RX ADMIN — Medication 10 ML: at 08:48

## 2023-04-05 RX ADMIN — LEVOTHYROXINE SODIUM 100 MCG: 0.1 TABLET ORAL at 06:14

## 2023-04-05 RX ADMIN — BISACODYL 10 MG: 10 SUPPOSITORY RECTAL at 01:40

## 2023-04-05 RX ADMIN — NYSTATIN 1 APPLICATION: 100000 CREAM TOPICAL at 08:47

## 2023-04-05 RX ADMIN — CETIRIZINE HYDROCHLORIDE 10 MG: 10 TABLET ORAL at 08:48

## 2023-04-05 RX ADMIN — VERAPAMIL HYDROCHLORIDE 240 MG: 240 TABLET, FILM COATED, EXTENDED RELEASE ORAL at 08:48

## 2023-04-05 RX ADMIN — BISACODYL 10 MG: 10 SUPPOSITORY RECTAL at 12:08

## 2023-04-05 RX ADMIN — ACETAMINOPHEN 650 MG: 325 TABLET ORAL at 08:47

## 2023-04-05 RX ADMIN — POLYETHYLENE GLYCOL 3350 17 G: 17 POWDER, FOR SOLUTION ORAL at 08:48

## 2023-04-05 RX ADMIN — ESCITALOPRAM OXALATE 10 MG: 10 TABLET, FILM COATED ORAL at 08:47

## 2023-04-05 RX ADMIN — ENOXAPARIN SODIUM 40 MG: 100 INJECTION SUBCUTANEOUS at 08:47

## 2023-04-05 RX ADMIN — DOCUSATE SODIUM 50MG AND SENNOSIDES 8.6MG 2 TABLET: 8.6; 5 TABLET, FILM COATED ORAL at 08:48

## 2023-04-05 NOTE — DISCHARGE SUMMARY
PHYSICIAN DISCHARGE SUMMARY                                                                        [facility]    Patient Identification:  Name: Dee Watts  Age: 82 y.o.  Sex: female  :  1940  MRN: [id number]  Primary Care Physician: Susanne Sharma APRN    Admit date: 2023  Discharge date and time:2023  Discharged Condition: good    Discharge Diagnoses:  Active Hospital Problems    Diagnosis  POA   • **AMS (altered mental status) [R41.82]  Yes   • Nausea and vomiting [R11.2]  Yes   • Weakness [R53.1]  Yes   • UTI (urinary tract infection) [N39.0]  Yes   • Hypercalcemia [E83.52]  Yes   • Obesity, morbid [E66.01]  Yes   • Hypertension [I10]  Yes   • Hypothyroidism [E03.9]  Yes   • Lumbar canal stenosis [M48.061]  Yes   • DDD (degenerative disc disease), lumbar [M51.36]  Yes   • Mood disorder (HCC) [F39]  Yes      Resolved Hospital Problems   No resolved problems to display.          PMHX:   Past Medical History:   Diagnosis Date   • Allergic rhinitis    • Anxiety    • Asthma    • DDD (degenerative disc disease), lumbar    • Depression    • GERD (gastroesophageal reflux disease)    • H/O bone density study 2012   • History of diverticulosis    • Hypertension    • Hypothyroidism    • IBS (irritable bowel syndrome)    • Insomnia    • Lumbar canal stenosis    • Lumbosacral neuritis    • Migraines    • Mood disorder    • Osteoarthritis    • Osteoporosis    • Thyroid goiter    • Vitamin D deficiency      PSHX:   Past Surgical History:   Procedure Laterality Date   • ANKLE SURGERY Left    • BACK SURGERY N/A 2005   • COLONOSCOPY N/A 2006   • HEMORRHOIDECTOMY N/A 1981   • HYSTERECTOMY N/A 1972   • PAP SMEAR N/A 10/31/2003   • THYROID CYST EXCISION      DATE NOT SPECIFIED   • WRIST SURGERY      LATERALITY AND DATE NOT SPECIFIED       Hospital Course: Dee Watts  is a 82 y.o.  hypertension, hypothyroidism, depression presenting with dysuria and frequency for the last week.  Family at bedside with most history as patient is confused about what is happened over the last week.  Apparently about a week ago patient started having dysuria and frequency and family recommended that she go to her primary care doctor, unfortunately they do not have any open appointments and recommended that she go to urgent care.  She did not end up going to an urgent care and her symptoms seem to be getting better.  However, on Friday when family saw patient she was more confused and when she did get better on Saturday they brought her to the emergency room.  Patient was having chills, nausea, vomiting, rigors.  No chest pain or difficulty breathing.  The patient's serum calcium was noted to be somewhat elevated at 11.  She was also somewhat dehydrated.  The patient was given some IV fluids and was taken off the hydrochlorothiazide which probably is aggravating her hypercalcemia.  PTH levels were normal.  Urinalysis really did not show much signs of infections and she only got 1 dose of Rocephin and was feeling better after being in the hospital for a few days.  Her mental status was improving but she was still pretty weak.  The plan is to go to a skilled nursing facility for rehab until she feels better and is able to go home.  She will follow-up with her primary care after released from rehab and advised that they avoid hydrochlorothiazide or combination hydrochlorothiazide blood pressure medication in the future which probably aggravated her hypercalcemia.  Serum protein electrophoresis and RON are pending at discharge.      Consults:     Consults     Date and Time Order Name Status Description    4/1/2023  8:41 PM [facility] (on-call MD unless specified) Details          Results from last 7 days   Lab Units 04/05/23  0528   WBC 10*3/mm3 8.60   HEMOGLOBIN g/dL 12.1   HEMATOCRIT % 36.6   PLATELETS 10*3/mm3 177      Results from last 7 days   Lab Units 04/05/23  0528   SODIUM mmol/L 137   POTASSIUM mmol/L 4.6   CHLORIDE mmol/L 102   CO2 mmol/L 24.8   BUN mg/dL 23   CREATININE mg/dL 0.93   GLUCOSE mg/dL 114*   CALCIUM mg/dL 9.8     Significant Diagnostic Studies:   WBC   Date Value Ref Range Status   04/05/2023 8.60 3.40 - 10.80 10*3/mm3 Final     Hemoglobin   Date Value Ref Range Status   04/05/2023 12.1 12.0 - 15.9 g/dL Final     Hematocrit   Date Value Ref Range Status   04/05/2023 36.6 34.0 - 46.6 % Final     Platelets   Date Value Ref Range Status   04/05/2023 177 140 - 450 10*3/mm3 Final     Sodium   Date Value Ref Range Status   04/05/2023 137 136 - 145 mmol/L Final     Potassium   Date Value Ref Range Status   04/05/2023 4.6 3.5 - 5.2 mmol/L Final     Chloride   Date Value Ref Range Status   04/05/2023 102 98 - 107 mmol/L Final     CO2   Date Value Ref Range Status   04/05/2023 24.8 22.0 - 29.0 mmol/L Final     BUN   Date Value Ref Range Status   04/05/2023 23 8 - 23 mg/dL Final     Creatinine   Date Value Ref Range Status   04/05/2023 0.93 0.57 - 1.00 mg/dL Final     Glucose   Date Value Ref Range Status   04/05/2023 114 (H) 65 - 99 mg/dL Final     Calcium   Date Value Ref Range Status   04/05/2023 9.8 8.6 - 10.5 mg/dL Final     No results found for: AST, ALT, ALKPHOS  No results found for: APTT, INR  No results found for: COLORU, CLARITYU, SPECGRAV, PHUR, PROTEINUR, GLUCOSEU, KETONESU, BLOODU, NITRITE, LEUKOCYTESUR, BILIRUBINUR, UROBILINOGEN, RBCUA, WBCUA, BACTERIA, UACOMMENT  No results found for: TROPONINT, TROPONINI, BNP  No components found for: HGBA1C;2  No components found for: TSH;2  Imaging Results (All)     Procedure Component Value Units Date/Time    CT Abdomen Pelvis Stone Protocol [[id number]] Collected: 04/02/23 1457     Updated: 04/02/23 1553    Narrative:      CT SCAN OF THE ABDOMEN AND PELVIS WITHOUT CONTRAST     HISTORY: Abdominal pain and urinary frequency.     The CT scan was performed as an  emergency procedure through the abdomen  and pelvis without contrast. The following findings are present:  1. The kidneys are normal in size. There are multiple peripelvic and  cortical renal cysts bilaterally. There is no evidence of renal or  ureteral stone or obstruction.  2. The lung bases are clear. The liver, spleen, pancreas, and both  adrenal glands are unremarkable without intravenous contrast. The  gallbladder appears normal.  3. There is no aortic aneurysm or retroperitoneal lymphadenopathy. The  large and small bowel loops are normal in caliber and show no  inflammatory change. The appendix appears normal.  4. In the pelvis there is sigmoid diverticulosis without CT evidence of  diverticulitis. The urinary bladder has a smooth contour.                 Radiation dose reduction techniques were utilized, including automated  exposure control and exposure modulation based on body size.     This report was finalized on 4/2/2023 3:50 PM by Dr. Jovani Price M.D.       CT Head Without Contrast [[id number]] Collected: 04/01/23 2056     Updated: 04/01/23 2103    Narrative:      CT HEAD WITHOUT CONTRAST     HISTORY: Mental status changes     COMPARISON: None available.     TECHNIQUE: Axial CT imaging was obtained through the brain. No IV  contrast was administered.     FINDINGS:  No acute intracranial hemorrhage is seen. There is diffuse atrophy.  There is periventricular and deep white matter microangiopathic change.  There is no midline shift or mass effect. Calcified lesion overlying the  left frontal lobe near the vertex may represent a meningioma. Mucous  retention cyst is noted within the left maxillary sinus.       Impression:      No acute intracranial findings.     Radiation dose reduction techniques were utilized, including automated  exposure control and exposure modulation based on body size.     This report was finalized on 4/1/2023 9:00 PM by Dr. Rox Jurado M.D.           Lab Results (last 7  days)     Procedure Component Value Units Date/Time    Blood Culture - Blood, Arm, Left [[id number]]  (Normal) Collected: 04/02/23 0637    Specimen: Blood from Arm, Left Updated: 04/05/23 0746     Blood Culture No growth at 3 days    Blood Culture - Blood, Arm, Right [[id number]]  (Normal) Collected: 04/02/23 0631    Specimen: Blood from Arm, Right Updated: 04/05/23 0746     Blood Culture No growth at 3 days    Basic Metabolic Panel [[id number]]  (Abnormal) Collected: 04/05/23 0528    Specimen: Blood Updated: 04/05/23 0702     Glucose 114 mg/dL      BUN 23 mg/dL      Creatinine 0.93 mg/dL      Sodium 137 mmol/L      Potassium 4.6 mmol/L      Chloride 102 mmol/L      CO2 24.8 mmol/L      Calcium 9.8 mg/dL      BUN/Creatinine Ratio 24.7     Anion Gap 10.2 mmol/L      eGFR 60.7 mL/min/1.73     Narrative:      GFR Normal >60  Chronic Kidney Disease <60  Kidney Failure <15    The GFR formula is only valid for adults with stable renal function between ages 18 and 70.    CBC & Differential [[id number]]  (Abnormal) Collected: 04/05/23 0528    Specimen: Blood Updated: 04/05/23 0543    Narrative:      The following orders were created for panel order CBC & Differential.  Procedure                               Abnormality         Status                     ---------                               -----------         ------                     CBC Auto Differential[[id number]]        Abnormal            Final result                 Please view results for these tests on the individual orders.    CBC Auto Differential [[id number]]  (Abnormal) Collected: 04/05/23 0528    Specimen: Blood Updated: 04/05/23 0543     WBC 8.60 10*3/mm3      RBC 4.07 10*6/mm3      Hemoglobin 12.1 g/dL      Hematocrit 36.6 %      MCV 89.9 fL      MCH 29.7 pg      MCHC 33.1 g/dL      RDW 12.4 %      RDW-SD 41.4 fl      MPV 11.5 fL      Platelets 177 10*3/mm3      Neutrophil % 70.3 %      Lymphocyte % 15.0 %      Monocyte % 8.6 %      Eosinophil % 5.1 %       Basophil % 0.3 %      Immature Grans % 0.7 %      Neutrophils, Absolute 6.04 10*3/mm3      Lymphocytes, Absolute 1.29 10*3/mm3      Monocytes, Absolute 0.74 10*3/mm3      Eosinophils, Absolute 0.44 10*3/mm3      Basophils, Absolute 0.03 10*3/mm3      Immature Grans, Absolute 0.06 10*3/mm3      nRBC 0.0 /100 WBC     Calcium, Ionized [[id number]]  (Abnormal) Collected: 04/03/23 0544    Specimen: Blood Updated: 04/03/23 0743     Ionized Calcium 1.38 mmol/L      Ionized Calcium 5.5 mg/dL     Basic Metabolic Panel [[id number]]  (Abnormal) Collected: 04/03/23 0544    Specimen: Blood Updated: 04/03/23 0713     Glucose 105 mg/dL      BUN 26 mg/dL      Creatinine 1.22 mg/dL      Sodium 139 mmol/L      Potassium 4.0 mmol/L      Chloride 106 mmol/L      CO2 23.7 mmol/L      Calcium 9.8 mg/dL      BUN/Creatinine Ratio 21.3     Anion Gap 9.3 mmol/L      eGFR 43.8 mL/min/1.73     Narrative:      GFR Normal >60  Chronic Kidney Disease <60  Kidney Failure <15    The GFR formula is only valid for adults with stable renal function between ages 18 and 70.    CBC & Differential [[id number]]  (Abnormal) Collected: 04/03/23 0544    Specimen: Blood Updated: 04/03/23 0648    Narrative:      The following orders were created for panel order CBC & Differential.  Procedure                               Abnormality         Status                     ---------                               -----------         ------                     CBC Auto Differential[[id number]]        Abnormal            Final result                 Please view results for these tests on the individual orders.    CBC Auto Differential [[id number]]  (Abnormal) Collected: 04/03/23 0544    Specimen: Blood Updated: 04/03/23 0648     WBC 9.28 10*3/mm3      RBC 4.27 10*6/mm3      Hemoglobin 12.5 g/dL      Hematocrit 39.4 %      MCV 92.3 fL      MCH 29.3 pg      MCHC 31.7 g/dL      RDW 13.0 %      RDW-SD 43.2 fl      MPV 11.9 fL      Platelets 216 10*3/mm3      Neutrophil % 67.4 %       Lymphocyte % 18.4 %      Monocyte % 8.9 %      Eosinophil % 3.9 %      Basophil % 0.8 %      Immature Grans % 0.6 %      Neutrophils, Absolute 6.25 10*3/mm3      Lymphocytes, Absolute 1.71 10*3/mm3      Monocytes, Absolute 0.83 10*3/mm3      Eosinophils, Absolute 0.36 10*3/mm3      Basophils, Absolute 0.07 10*3/mm3      Immature Grans, Absolute 0.06 10*3/mm3      nRBC 0.1 /100 WBC     RON,PE and FLC, Serum [[id number]] Collected: 04/02/23 1846    Specimen: Blood from Arm, Left Updated: 04/02/23 1853    Phosphorus [[id number]]  (Normal) Collected: 04/02/23 0631    Specimen: Blood Updated: 04/02/23 1715     Phosphorus 4.1 mg/dL     Magnesium [[id number]]  (Normal) Collected: 04/02/23 0631    Specimen: Blood Updated: 04/02/23 1715     Magnesium 2.3 mg/dL     PTH, Intact [[id number]]  (Normal) Collected: 04/01/23 1803    Specimen: Blood Updated: 04/02/23 1324     PTH, Intact 51.1 pg/mL     Narrative:      Results may be falsely decreased if patient taking Biotin.      Vitamin B12 [[id number]]  (Normal) Collected: 04/02/23 0631    Specimen: Blood Updated: 04/02/23 0727     Vitamin B-12 235 pg/mL     Narrative:      Results may be falsely increased if patient taking Biotin.      TSH [[id number]]  (Normal) Collected: 04/02/23 0631    Specimen: Blood Updated: 04/02/23 0721     TSH 1.990 uIU/mL     Basic Metabolic Panel [[id number]]  (Abnormal) Collected: 04/02/23 0631    Specimen: Blood Updated: 04/02/23 0715     Glucose 98 mg/dL      BUN 20 mg/dL      Creatinine 0.95 mg/dL      Sodium 143 mmol/L      Potassium 3.9 mmol/L      Chloride 107 mmol/L      CO2 27.0 mmol/L      Calcium 10.0 mg/dL      BUN/Creatinine Ratio 21.1     Anion Gap 9.0 mmol/L      eGFR 59.2 mL/min/1.73     Narrative:      GFR Normal >60  Chronic Kidney Disease <60  Kidney Failure <15    The GFR formula is only valid for adults with stable renal function between ages 18 and 70.    CBC & Differential [[id number]]  (Abnormal) Collected: 04/02/23 0647     Specimen: Blood Updated: 04/02/23 0658    Narrative:      The following orders were created for panel order CBC & Differential.  Procedure                               Abnormality         Status                     ---------                               -----------         ------                     CBC Auto Differential[[id number]]        Abnormal            Final result                 Please view results for these tests on the individual orders.    CBC Auto Differential [[id number]]  (Abnormal) Collected: 04/02/23 0631    Specimen: Blood Updated: 04/02/23 0658     WBC 8.89 10*3/mm3      RBC 4.64 10*6/mm3      Hemoglobin 13.6 g/dL      Hematocrit 42.2 %      MCV 90.9 fL      MCH 29.3 pg      MCHC 32.2 g/dL      RDW 13.1 %      RDW-SD 43.1 fl      MPV 11.2 fL      Platelets 228 10*3/mm3      Neutrophil % 69.9 %      Lymphocyte % 18.4 %      Monocyte % 8.1 %      Eosinophil % 2.6 %      Basophil % 0.6 %      Immature Grans % 0.4 %      Neutrophils, Absolute 6.21 10*3/mm3      Lymphocytes, Absolute 1.64 10*3/mm3      Monocytes, Absolute 0.72 10*3/mm3      Eosinophils, Absolute 0.23 10*3/mm3      Basophils, Absolute 0.05 10*3/mm3      Immature Grans, Absolute 0.04 10*3/mm3      nRBC 0.0 /100 WBC     Procalcitonin [[id number]]  (Normal) Collected: 04/01/23 2045    Specimen: Blood Updated: 04/01/23 2221     Procalcitonin 0.05 ng/mL     Narrative:      As a Marker for Sepsis (Non-Neonates):    1. <0.5 ng/mL represents a low risk of severe sepsis and/or septic shock.  2. >2 ng/mL represents a high risk of severe sepsis and/or septic shock.    As a Marker for Lower Respiratory Tract Infections that require antibiotic therapy:    PCT on Admission    Antibiotic Therapy       6-12 Hrs later    >0.5                Strongly Recommended  >0.25 - <0.5        Recommended   0.1 - 0.25          Discouraged              Remeasure/reassess PCT  <0.1                Strongly Discouraged     Remeasure/reassess PCT    As 28 day mortality  "risk marker: \"Change in Procalcitonin Result\" (>80% or <=80%) if Day 0 (or Day 1) and Day 4 values are available. Refer to http://www.[email or web address]    Change in PCT <=80%  A decrease of PCT levels below or equal to 80% defines a positive change in PCT test result representing a higher risk for 28-day all-cause mortality of patients diagnosed with severe sepsis for septic shock.    Change in PCT >80%  A decrease of PCT levels of more than 80% defines a negative change in PCT result representing a lower risk for 28-day all-cause mortality of patients diagnosed with severe sepsis or septic shock.       High Sensitivity Troponin T 2Hr [[id number]]  (Abnormal) Collected: 04/01/23 2045    Specimen: Blood Updated: 04/01/23 2116     HS Troponin T 12 ng/L      Troponin T Delta 0 ng/L     Narrative:      High Sensitive Troponin T Reference Range:  <10.0 ng/L- Negative Female for AMI  <15.0 ng/L- Negative Male for AMI  >=10 - Abnormal Female indicating possible myocardial injury.  >=15 - Abnormal Male indicating possible myocardial injury.   Clinicians would have to utilize clinical acumen, EKG, Troponin, and serial changes to determine if it is an Acute Myocardial Infarction or myocardial injury due to an underlying chronic condition.         Respiratory Panel PCR w/COVID-19(SARS-CoV-2) BRIAN/SRIDHAR/MARGUERITE/PAD/COR/MAD/KERI In-House, NP Swab in UTM/VTM, 3-4 HR TAT - Swab, Nasopharynx [[id number]]  (Normal) Collected: 04/01/23 1918    Specimen: Swab from Nasopharynx Updated: 04/01/23 2026     ADENOVIRUS, PCR Not Detected     Coronavirus 229E Not Detected     Coronavirus HKU1 Not Detected     Coronavirus NL63 Not Detected     Coronavirus OC43 Not Detected     COVID19 Not Detected     Human Metapneumovirus Not Detected     Human Rhinovirus/Enterovirus Not Detected     Influenza A PCR Not Detected     Influenza B PCR Not Detected     Parainfluenza Virus 1 Not Detected     Parainfluenza Virus 2 Not Detected     Parainfluenza Virus " 3 Not Detected     Parainfluenza Virus 4 Not Detected     RSV, PCR Not Detected     Bordetella pertussis pcr Not Detected     Bordetella parapertussis PCR Not Detected     Chlamydophila pneumoniae PCR Not Detected     Mycoplasma pneumo by PCR Not Detected    Narrative:      In the setting of a positive respiratory panel with a viral infection PLUS a negative procalcitonin without other underlying concern for bacterial infection, consider observing off antibiotics or discontinuation of antibiotics and continue supportive care. If the respiratory panel is positive for atypical bacterial infection (Bordetella pertussis, Chlamydophila pneumoniae, or Mycoplasma pneumoniae), consider antibiotic de-escalation to target atypical bacterial infection.    [place] Draw [[id number]] Collected: 04/01/23 1803    Specimen: Blood Updated: 04/01/23 1916    Narrative:      The following orders were created for panel order [place] Draw.  Procedure                               Abnormality         Status                     ---------                               -----------         ------                     Green Top (Gel)[[id number]]                                  Final result               Lavender Top[[id number]]                                     Final result               Gold Top - SST[[id number]]                                   Final result               Light Blue Top[[id number]]                                   Final result                 Please view results for these tests on the individual orders.    Lavender Top [[id number]] Collected: 04/01/23 1803    Specimen: Blood Updated: 04/01/23 1916     Extra Tube hold for add-on     Comment: Auto resulted       Gold Top - SST [[id number]] Collected: 04/01/23 1803    Specimen: Blood Updated: 04/01/23 1916     Extra Tube Hold for add-ons.     Comment: Auto resulted.       Light Blue Top [[id number]] Collected: 04/01/23 1803    Specimen: Blood Updated: 04/01/23 1916     Extra Tube  Hold for add-ons.     Comment: Auto resulted       Green Top (Gel) [[id number]] Collected: 04/01/23 1803    Specimen: Blood Updated: 04/01/23 1916     Extra Tube Hold for add-ons.     Comment: Auto resulted.       Urinalysis, Microscopic Only - Urine, Catheter [[id number]]  (Abnormal) Collected: 04/01/23 1758    Specimen: Urine, Catheter Updated: 04/01/23 1902     RBC, UA None Seen /HPF      WBC, UA 3-5 /HPF      Bacteria, UA None Seen /HPF      Squamous Epithelial Cells, UA 3-6 /HPF      Hyaline Casts, UA 0-2 /LPF      Methodology Manual Light Microscopy    High Sensitivity Troponin T [[id number]]  (Abnormal) Collected: 04/01/23 1803    Specimen: Blood Updated: 04/01/23 1846     HS Troponin T 12 ng/L     Narrative:      High Sensitive Troponin T Reference Range:  <10.0 ng/L- Negative Female for AMI  <15.0 ng/L- Negative Male for AMI  >=10 - Abnormal Female indicating possible myocardial injury.  >=15 - Abnormal Male indicating possible myocardial injury.   Clinicians would have to utilize clinical acumen, EKG, Troponin, and serial changes to determine if it is an Acute Myocardial Infarction or myocardial injury due to an underlying chronic condition.         Urinalysis With Microscopic If Indicated (No Culture) - Urine, Catheter [[id number]]  (Abnormal) Collected: 04/01/23 1758    Specimen: Urine, Catheter Updated: 04/01/23 1844     Color, UA Yellow     Appearance, UA Clear     pH, UA 6.0     Specific Gravity, UA 1.029     Glucose, UA Negative     Ketones, UA Negative     Bilirubin, UA Negative     Blood, UA Negative     Protein,  mg/dL (2+)     Leuk Esterase, UA Trace     Nitrite, UA Negative     Urobilinogen, UA 1.0 E.U./dL    Comprehensive Metabolic Panel [[id number]]  (Abnormal) Collected: 04/01/23 1803    Specimen: Blood Updated: 04/01/23 1834     Glucose 101 mg/dL      BUN 23 mg/dL      Creatinine 1.09 mg/dL      Sodium 143 mmol/L      Potassium 3.7 mmol/L      Chloride 106 mmol/L      CO2 25.7 mmol/L       Calcium 11.0 mg/dL      Total Protein 7.2 g/dL      Albumin 4.3 g/dL      ALT (SGPT) 12 U/L      AST (SGOT) 12 U/L      Alkaline Phosphatase 56 U/L      Total Bilirubin 0.4 mg/dL      Globulin 2.9 gm/dL      A/G Ratio 1.5 g/dL      BUN/Creatinine Ratio 21.1     Anion Gap 11.3 mmol/L      eGFR 50.2 mL/min/1.73     Narrative:      GFR Normal >60  Chronic Kidney Disease <60  Kidney Failure <15    The GFR formula is only valid for adults with stable renal function between ages 18 and 70.    BNP [[id number]]  (Normal) Collected: 04/01/23 1803    Specimen: Blood Updated: 04/01/23 1833     proBNP 207.0 pg/mL     Narrative:      Among patients with dyspnea, NT-proBNP is highly sensitive for the detection of acute congestive heart failure. In addition NT-proBNP of <300 pg/ml effectively rules out acute congestive heart failure with 99% negative predictive value.    Results may be falsely decreased if patient taking Biotin.      CBC & Differential [[id number]]  (Abnormal) Collected: 04/01/23 1803    Specimen: Blood Updated: 04/01/23 1814    Narrative:      The following orders were created for panel order CBC & Differential.  Procedure                               Abnormality         Status                     ---------                               -----------         ------                     CBC Auto Differential[[id number]]        Abnormal            Final result                 Please view results for these tests on the individual orders.    CBC Auto Differential [[id number]]  (Abnormal) Collected: 04/01/23 1803    Specimen: Blood Updated: 04/01/23 1814     WBC 11.51 10*3/mm3      RBC 4.66 10*6/mm3      Hemoglobin 13.7 g/dL      Hematocrit 42.9 %      MCV 92.1 fL      MCH 29.4 pg      MCHC 31.9 g/dL      RDW 13.3 %      RDW-SD 45.6 fl      MPV 11.4 fL      Platelets 247 10*3/mm3      Neutrophil % 72.8 %      Lymphocyte % 16.6 %      Monocyte % 8.5 %      Eosinophil % 1.2 %      Basophil % 0.4 %      Immature Grans % 0.5 %   "    Neutrophils, Absolute 8.37 10*3/mm3      Lymphocytes, Absolute 1.91 10*3/mm3      Monocytes, Absolute 0.98 10*3/mm3      Eosinophils, Absolute 0.14 10*3/mm3      Basophils, Absolute 0.05 10*3/mm3      Immature Grans, Absolute 0.06 10*3/mm3      nRBC 0.0 /100 WBC     POC Glucose Once [[id number]]  (Normal) Collected: 04/01/23 1749    Specimen: Blood Updated: 04/01/23 1750     Glucose 92 mg/dL      Comment: Meter: [id number] : [id number] Leia [employer] ESTEVAN           /84 (BP Location: Right arm, Patient Position: Lying)   Pulse 73   Temp 97.8 °F (36.6 °C) (Oral)   Resp 16   Ht 152.4 cm (60\")   Wt 81.6 kg (179 lb 14.3 oz)   LMP  (LMP Unknown)   SpO2 96%   BMI 35.13 kg/m²     Discharge Exam:  General Appearance:    Alert, cooperative, no distress                          Head:    Normocephalic, without obvious abnormality, atraumatic                          Eyes:                            Throat:   Lips, tongue, gums normal                          Neck:   Supple, symmetrical, trachea midline, no JVD                        Lungs:     Clear to auscultation bilaterally, respirations unlabored                Chest Wall:    No tenderness or deformity                        Heart:    Regular rate and rhythm, S1 and S2 normal, no murmur,no  Rub  or gallop                  Abdomen:     Soft, non-tender, bowel sounds active, no masses, no  organomegaly                  Extremities:   Extremities normal, atraumatic, no cyanosis or edema                             Skin:   Skin is warm and dry,  no rashes or palpable lesions                  Neurologic:   no focal deficits noted     Disposition:  Skilled nursing facility    Activity as tolerated    Diet as tolerated  Diet Order   Procedures   • Diet: Regular/House Diet; Texture: Regular Texture (IDDSI 7); Fluid Consistency: Thin (IDDSI 0)       Patient Instructions:      Discharge Medications      New Medications      Instructions Start Date   acetaminophen " 325 MG tablet  Commonly known as: TYLENOL   650 mg, Oral, Every 4 Hours PRN      bisacodyl 10 MG suppository  Commonly known as: DULCOLAX   10 mg, Rectal, Daily PRN      melatonin 5 MG tablet tablet   5 mg, Oral, Nightly PRN      nystatin 747559 UNIT/GM cream  Commonly known as: MYCOSTATIN   1 application, Topical, Every 12 Hours Scheduled      polyethylene glycol 17 g packet  Commonly known as: MIRALAX   17 g, Oral, Daily   Start Date: April 6, 2023     sennosides-docusate 8.6-50 MG per tablet  Commonly known as: PERICOLACE   2 tablets, Oral, 2 Times Daily      valsartan 160 MG tablet  Commonly known as: DIOVAN  Replaces: valsartan-hydrochlorothiazide 160-25 MG per tablet   160 mg, Oral, Every 24 Hours Scheduled   Start Date: April 6, 2023        Continue These Medications      Instructions Start Date   albuterol sulfate  (90 Base) MCG/ACT inhaler  Commonly known as: PROVENTIL HFA;VENTOLIN HFA;PROAIR HFA   2 puffs, Inhalation, Every 4 Hours PRN      baclofen 20 MG tablet  Commonly known as: LIORESAL   10 mg, Oral, 3 Times Daily PRN      escitalopram 20 MG tablet  Commonly known as: LEXAPRO   TAKE 1 TABLET BY MOUTH DAILY      fexofenadine 180 MG tablet  Commonly known as: ALLEGRA   180 mg, Oral, Daily      levothyroxine 100 MCG tablet  Commonly known as: SYNTHROID, LEVOTHROID   100 mcg, Oral, Daily      levothyroxine 88 MCG tablet  Commonly known as: SYNTHROID, LEVOTHROID   88 mcg, Oral, Every Other Day      raloxifene 60 MG tablet  Commonly known as: EVISTA   60 mg, Oral, Daily      verapamil  MG CR tablet  Commonly known as: CALAN-SR   240 mg, Oral, Daily         Stop These Medications    gabapentin 800 MG tablet  Commonly known as: NEURONTIN     ketoconazole 2 % cream  Commonly known as: NIZORAL     ketoconazole 2 % shampoo  Commonly known as: NIZORAL     nystatin-triamcinolone 288999-9.1 UNIT/GM-% cream  Commonly known as: MYCOLOG II     valsartan-hydrochlorothiazide 160-25 MG per tablet  Commonly  known as: DIOVAN-HCT  Replaced by: valsartan 160 MG tablet          Future Appointments   Date Time Provider Department Center   8/16/2023 10:00 AM Susanne Sharma APRN [facility]      Contact information for follow-up providers     Susanne Sharma APRN Follow up.    Specialty: Internal Medicine  Why: After released from rehab  Contact information:  [address]  [address] [id number] [phone]                   Contact information for after-discharge care     Destination     [facility] .    Service: Skilled Nursing  Contact information:  [address] [facility] [address] [phone]                           Discharge Order (From admission, onward)     Start     Ordered    04/05/23 1447  Discharge patient  Once        Expected Discharge Date: 04/05/23    Discharge Disposition: Skilled Nursing Facility (DC - External)    Physician of Record for Attribution - Please select from Treatment Team: RODO BEACH [[id number]]    Review needed by CMO to determine Physician of Record: No       Question Answer Comment   Physician of Record for Attribution - Please select from Treatment Team RODO BEACH    Review needed by CMO to determine Physician of Record No        04/05/23 1452                Total time spent discharging patient including evaluation,post hospitalization follow up,  medication and post hospitalization instructions and education total time exceeds 30 minutes.    Signed:  Rodo Beach MD  4/5/2023  14:55 EDT

## 2023-04-05 NOTE — PLAN OF CARE
Goal Outcome Evaluation:  Plan of Care Reviewed With: patient           Outcome Evaluation: Pt participated with PT this PM. Pt in bed at arrival and eager to mobilize. She does report her LLE has been bothering her extra today. She ambulated 40' with cga using a walker - fatigue and pain limiting. Plans SNF at d/c.

## 2023-04-05 NOTE — PROGRESS NOTES
Case Management Discharge Note      Final Note: Baldomero Alvarez, [facility] has obtained [employer] Medicare pre-cert and can accept pt today. Pharmacy updated and packet on pt chart. Pt's daughter in law plans to transport her. Seema Almaguer LCSW         Selected Continued Care - Admitted Since 4/1/2023     Destination Coordination complete.    Service Provider Selected Services Address Phone Fax Patient Preferred    [facility] [address] [place] [address] [phone] --          Durable Medical Equipment    No services have been selected for the patient.              Dialysis/Infusion    No services have been selected for the patient.              Home Medical Care    No services have been selected for the patient.              Therapy    No services have been selected for the patient.              Community Resources    No services have been selected for the patient.              Community & DME    No services have been selected for the patient.                  Transportation Services  Private: Car    Final Discharge Disposition Code: 03 - skilled nursing facility (SNF)

## 2023-04-05 NOTE — THERAPY TREATMENT NOTE
Patient Name: Dee Watts  : 1940    MRN: [id number]                              Today's Date: 2023       Admit Date: 2023    Visit Dx:     ICD-10-CM ICD-9-CM   1. Altered mental status, unspecified altered mental status type  R41.82 780.97   2. Weakness  R53.1 780.79   3. Dysuria  R30.0 788.1   4. Nausea and vomiting, unspecified vomiting type  R11.2 787.01   5. Hypertension, unspecified type  I10 401.9     Patient Active Problem List   Diagnosis   • DDD (degenerative disc disease), lumbar   • Osteoarthritis of hip   • Lumbosacral neuritis   • Lumbar canal stenosis   • Hypothyroidism   • Hypertension   • Mood disorder (HCC)   • Vitamin D deficiency   • IBS (irritable bowel syndrome)   • Osteoporosis   • Migraines   • Chronic breast pain   • Status post total hip replacement, right   • Mixed stress and urge urinary incontinence   • Allergic rhinitis   • Episodic paroxysmal hemicrania, not intractable   • Vertigo   • Obesity, morbid   • Subdural hematoma   • Hypercalcemia   • Chronic right-sided low back pain without sciatica   • Right-sided headache   • AMS (altered mental status)   • UTI (urinary tract infection)   • Nausea and vomiting   • Weakness     Past Medical History:   Diagnosis Date   • Allergic rhinitis    • Anxiety    • Asthma    • DDD (degenerative disc disease), lumbar    • Depression    • GERD (gastroesophageal reflux disease)    • H/O bone density study 2012   • History of diverticulosis    • Hypertension    • Hypothyroidism    • IBS (irritable bowel syndrome)    • Insomnia    • Lumbar canal stenosis    • Lumbosacral neuritis    • Migraines    • Mood disorder    • Osteoarthritis    • Osteoporosis    • Thyroid goiter    • Vitamin D deficiency      Past Surgical History:   Procedure Laterality Date   • ANKLE SURGERY Left    • BACK SURGERY N/A 2005   • COLONOSCOPY N/A 2006   • HEMORRHOIDECTOMY N/A 1981   • HYSTERECTOMY N/A 1972   • PAP SMEAR N/A  10/31/2003   • THYROID CYST EXCISION      DATE NOT SPECIFIED   • WRIST SURGERY      LATERALITY AND DATE NOT SPECIFIED      General Information     Row Name 04/05/23 1339          Physical Therapy Time and Intention    Document Type therapy note (daily note)  -CW     Mode of Treatment physical therapy;individual therapy  -CW     Row Name 04/05/23 1339          General Information    Patient Profile Reviewed yes  -CW     Existing Precautions/Restrictions fall  -CW     Row Name 04/05/23 1339          Cognition    Orientation Status (Cognition) oriented x 4  -CW     Row Name 04/05/23 1339          Safety Issues, Functional Mobility    Impairments Affecting Function (Mobility) endurance/activity tolerance;balance;strength;pain  -CW           User Key  (r) = Recorded By, (t) = Taken By, (c) = Cosigned By    Initials Name Provider Type    CW Myla Guzman, PT Physical Therapist               Mobility     Row Name 04/05/23 1339          Bed Mobility    Bed Mobility supine-sit  -CW     Supine-Sit Bremer (Bed Mobility) contact guard;verbal cues  -CW     Assistive Device (Bed Mobility) head of bed elevated;bed rails  -     Row Name 04/05/23 1339          Bed-Chair Transfer    Bed-Chair Bremer (Transfers) 1 person assist;contact guard  -CW     Assistive Device (Bed-Chair Transfers) walker, front-wheeled  -CW     Row Name 04/05/23 1339          Sit-Stand Transfer    Sit-Stand Bremer (Transfers) contact guard;verbal cues;minimum assist (75% patient effort)  -CW     Assistive Device (Sit-Stand Transfers) walker, front-wheeled  -CW     Row Name 04/05/23 1333          Gait/Stairs (Locomotion)    Bremer Level (Gait) verbal cues;contact guard  -CW     Assistive Device (Gait) walker, front-wheeled  -CW     Distance in Feet (Gait) 40'  -CW     Deviations/Abnormal Patterns (Gait) bilateral deviations;gait speed decreased;flakito decreased;base of support, wide;stride length decreased  -CW     Bilateral Gait  Deviations forward flexed posture;heel strike decreased  -CW     Comment, (Gait/Stairs) Distance limited by fatigue and LLE pain  -CW           User Key  (r) = Recorded By, (t) = Taken By, (c) = Cosigned By    Initials Name Provider Type    Myla Charles PT Physical Therapist               Obj/Interventions    No documentation.                Goals/Plan    No documentation.                Clinical Impression     Row Name 04/05/23 1340          Pain    Pretreatment Pain Rating 3/10  -CW     Posttreatment Pain Rating 3/10  -CW     Pain Location - Side/Orientation Left  -CW     Pain Location lower  -CW     Pain Location - extremity  -CW     Pain Intervention(s) Repositioned;Rest;Ambulation/increased activity  -CW     Row Name 04/05/23 1340          Plan of Care Review    Plan of Care Reviewed With patient  -CW     Outcome Evaluation Pt participated with PT this PM. Pt in bed at arrival and eager to mobilize. She does report her LLE has been bothering her extra today. She ambulated 40' with cga using a walker - fatigue and pain limiting. Plans SNF at d/c.  -CW     Row Name 04/05/23 1340          Therapy Assessment/Plan (PT)    Therapy Frequency (PT) 5 times/wk  -CW     Row Name 04/05/23 1340          Vital Signs    O2 Delivery Pre Treatment room air  -CW     O2 Delivery Intra Treatment room air  -CW     O2 Delivery Post Treatment room air  -CW     Row Name 04/05/23 1340          Positioning and Restraints    Pre-Treatment Position in bed  -CW     Post Treatment Position chair  -CW     In Chair reclined;call light within reach;encouraged to call for assist;exit alarm on;notified nsg;legs elevated  -CW           User Key  (r) = Recorded By, (t) = Taken By, (c) = Cosigned By    Initials Name Provider Type    Myla Charles, KAETRINA Physical Therapist               Outcome Measures     Row Name 04/05/23 1342 04/05/23 0840       How much help from another person do you currently need...    Turning from your back to  your side while in flat bed without using bedrails? 3  -CW 3  -JM    Moving from lying on back to sitting on the side of a flat bed without bedrails? 3  -CW 3  -JM    Moving to and from a bed to a chair (including a wheelchair)? 3  -CW 3  -JM    Standing up from a chair using your arms (e.g., wheelchair, bedside chair)? 3  -CW 3  -JM    Climbing 3-5 steps with a railing? 2  -CW 2  -JM    To walk in hospital room? 3  -CW 3  -JM    AM-PAC 6 Clicks Score (PT) 17  -CW 17  -JM    Highest level of mobility 5 --> Static standing  -CW 5 --> Static standing  -JM    Row Name 04/05/23 1342          Functional Assessment    Outcome Measure Options AM-PAC 6 Clicks Basic Mobility (PT)  -CW           User Key  (r) = Recorded By, (t) = Taken By, (c) = Cosigned By    Initials Name Provider Type    Esperanza Quach, RN Registered Nurse    Myla Charles, PT Physical Therapist                             Physical Therapy Education     Title: PT OT SLP Therapies (Done)     Topic: Physical Therapy (Done)     Point: Mobility training (Done)     Learning Progress Summary           Patient Acceptance, E, NR,VU by SCARLETT at 4/5/2023 1342    Acceptance, E, VU by SCARLETT at 4/4/2023 1438    Acceptance, E,TB, VU by VANDANA at 4/2/2023 1416   Family Acceptance, E,TB, VU by VANDANA at 4/2/2023 1416                   Point: Home exercise program (Done)     Learning Progress Summary           Patient Acceptance, E, VU by SCARLETT at 4/4/2023 1438    Acceptance, E,TB, VU by VANDANA at 4/2/2023 1416   Family Acceptance, E,TB, VU by VANDANA at 4/2/2023 1416                   Point: Body mechanics (Done)     Learning Progress Summary           Patient Acceptance, E, VU by SCARLETT at 4/4/2023 1438    Acceptance, E,TB, VU by VANDANA at 4/2/2023 1416   Family Acceptance, E,TB, VU by VANDANA at 4/2/2023 1416                   Point: Precautions (Done)     Learning Progress Summary           Patient Acceptance, E, VU by SCARLETT at 4/4/2023 1438    Acceptance, E,TB, VU by VANDANA at 4/2/2023 1416   Family  Acceptance, E,TB, VU by VANDANA at 4/2/2023 1416                               User Key     Initials Effective Dates Name Provider Type Discipline     05/19/21 -  Aundrea Jimenez, PT Physical Therapist PT    CW 12/13/22 -  Myla Guzman PT Physical Therapist PT              PT Recommendation and Plan     Plan of Care Reviewed With: patient  Outcome Evaluation: Pt participated with PT this PM. Pt in bed at arrival and eager to mobilize. She does report her LLE has been bothering her extra today. She ambulated 40' with cga using a walker - fatigue and pain limiting. Plans SNF at d/c.     Time Calculation:    PT Charges     Row Name 04/05/23 1338             Time Calculation    Start Time 1309  -CW      Stop Time 1333  -CW      Time Calculation (min) 24 min  -CW      PT Received On 04/05/23  -CW      PT - Next Appointment 04/06/23  -CW            User Key  (r) = Recorded By, (t) = Taken By, (c) = Cosigned By    Initials Name Provider Type    CW Myla Guzman, PT Physical Therapist              Therapy Charges for Today     Code Description Service Date Service Provider Modifiers Qty    68719891470 HC GAIT TRAINING EA 15 MIN 4/4/2023 Myla Guzman, PT GP 1    50973383805 HC PT THER PROC EA 15 MIN 4/4/2023 Myla Guzman, PT GP 1    98737241028 HC GAIT TRAINING EA 15 MIN 4/5/2023 Myla Guzman, PT GP 1    51576861525 HC PT THERAPEUTIC ACT EA 15 MIN 4/5/2023 Myla Guzman, PT GP 1          PT G-Codes  Outcome Measure Options: AM-PAC 6 Clicks Basic Mobility (PT)  AM-PAC 6 Clicks Score (PT): 17       Myla Guzman PT  4/5/2023

## 2023-04-07 LAB
BACTERIA SPEC AEROBE CULT: NORMAL
BACTERIA SPEC AEROBE CULT: NORMAL

## 2023-04-14 ENCOUNTER — TELEPHONE (OUTPATIENT)
Dept: INTERNAL MEDICINE | Facility: CLINIC | Age: 83
End: 2023-04-14

## 2023-04-16 ENCOUNTER — READMISSION MANAGEMENT (OUTPATIENT)
Dept: CALL CENTER | Facility: HOSPITAL | Age: 83
End: 2023-04-16
Payer: MEDICARE

## 2023-04-16 NOTE — OUTREACH NOTE
Prep Survey    Flowsheet Row Responses   [facility] patient discharged from? Non-[facility]   Is LACE score < 7 ? Non-BH Discharge   Eligibility [facility]    Date of Discharge 04/16/23   Discharge Disposition Home or Self Care   Discharge diagnosis UTI   Does the patient have one of the following disease processes/diagnoses(primary or secondary)? Other   Prep survey completed? Yes          Caity CLARK - Registered Nurse

## 2023-04-17 ENCOUNTER — TELEPHONE (OUTPATIENT)
Dept: INTERNAL MEDICINE | Facility: CLINIC | Age: 83
End: 2023-04-17
Payer: MEDICARE

## 2023-04-17 ENCOUNTER — TRANSITIONAL CARE MANAGEMENT TELEPHONE ENCOUNTER (OUTPATIENT)
Dept: CALL CENTER | Facility: HOSPITAL | Age: 83
End: 2023-04-17
Payer: MEDICARE

## 2023-04-17 NOTE — OUTREACH NOTE
Call Center TCM Note    Flowsheet Row Responses   [facility] patient discharged from? Non-BH   Does the patient have one of the following disease processes/diagnoses(primary or secondary)? Other   TCM attempt successful? No   Unsuccessful attempts Attempt 1          Lis Sutherland MA    4/17/2023, 15:18 EDT

## 2023-04-17 NOTE — OUTREACH NOTE
Call Center TCM Note    Flowsheet Row Responses   [facility] patient discharged from? Non-   Does the patient have one of the following disease processes/diagnoses(primary or secondary)? Other   TCM attempt successful? Yes   Call start time 1651   Call end time 1655   Discharge diagnosis UTI   Meds reviewed with patient/caregiver? Yes   Is the patient having any side effects they believe may be caused by any medication additions or changes? No   Does the patient have all medications ordered at discharge? Yes   Is the patient taking all medications as directed (includes completed medication regime)? Yes   Does the patient have an appointment with their PCP within 7 days of discharge? No appointments available   Nursing Interventions Routed TCM call to PCP office   Home health comments AMEDYSIS ordered by SNF, order pending with PCP    Psychosocial issues? No   Did the patient receive a copy of their discharge instructions? Yes   Nursing interventions Reviewed instructions with patient   What is the patient's perception of their health status since discharge? Improving   Is the patient/caregiver able to teach back signs and symptoms related to disease process for when to call PCP? Yes   Is the patient/caregiver able to teach back signs and symptoms related to disease process for when to call 911? Yes   Is the patient/caregiver able to teach back the hierarchy of who to call/visit for symptoms/problems? PCP, Specialist, Home health nurse, Urgent Care, ED, 911 Yes   If the patient is a current smoker, are they able to teach back resources for cessation? Not a smoker   TCM call completed? Yes   Wrap up additional comments D/C DX: UTI - Pt doing ok, still having nausea but less severe. All needed home medications in place. Pt does want TCM APPT asap with PCP Susanne Sharma if ofc can find time on her schedule. Pt also told due to some testing on her kidneys she needs referral to urologist pretty quickly.    Call  end time [phone]          Lis Sutherland MA    4/17/2023, 16:58 EDT

## 2023-04-20 ENCOUNTER — OFFICE VISIT (OUTPATIENT)
Dept: INTERNAL MEDICINE | Facility: CLINIC | Age: 83
End: 2023-04-20
Payer: MEDICARE

## 2023-04-20 ENCOUNTER — TELEPHONE (OUTPATIENT)
Dept: INTERNAL MEDICINE | Facility: CLINIC | Age: 83
End: 2023-04-20

## 2023-04-20 VITALS
WEIGHT: 201 LBS | HEIGHT: 60 IN | HEART RATE: 92 BPM | DIASTOLIC BLOOD PRESSURE: 84 MMHG | OXYGEN SATURATION: 93 % | SYSTOLIC BLOOD PRESSURE: 130 MMHG | TEMPERATURE: 97.1 F | BODY MASS INDEX: 39.46 KG/M2

## 2023-04-20 DIAGNOSIS — R11.2 NAUSEA AND VOMITING, UNSPECIFIED VOMITING TYPE: ICD-10-CM

## 2023-04-20 DIAGNOSIS — R10.13 EPIGASTRIC PAIN: ICD-10-CM

## 2023-04-20 DIAGNOSIS — H57.11 PAIN IN RIGHT EYE: Primary | ICD-10-CM

## 2023-04-20 DIAGNOSIS — E83.52 HYPERCALCEMIA: ICD-10-CM

## 2023-04-20 DIAGNOSIS — E03.9 ACQUIRED HYPOTHYROIDISM: Chronic | ICD-10-CM

## 2023-04-20 DIAGNOSIS — I10 PRIMARY HYPERTENSION: Chronic | ICD-10-CM

## 2023-04-20 PROBLEM — N39.0 UTI (URINARY TRACT INFECTION): Status: RESOLVED | Noted: 2023-04-01 | Resolved: 2023-04-20

## 2023-04-20 RX ORDER — ONDANSETRON 8 MG/1
TABLET, ORALLY DISINTEGRATING ORAL
COMMUNITY
Start: 2023-04-14 | End: 2023-04-20 | Stop reason: SDUPTHER

## 2023-04-20 RX ORDER — ONDANSETRON 8 MG/1
8 TABLET, ORALLY DISINTEGRATING ORAL EVERY 12 HOURS PRN
Qty: 30 TABLET | Refills: 0 | Status: SHIPPED | OUTPATIENT
Start: 2023-04-20

## 2023-04-20 RX ORDER — OMEPRAZOLE 40 MG/1
40 CAPSULE, DELAYED RELEASE ORAL DAILY
Qty: 30 CAPSULE | Refills: 2 | Status: SHIPPED | OUTPATIENT
Start: 2023-04-20

## 2023-04-20 NOTE — TELEPHONE ENCOUNTER
Caller: Mercedes Watts    Relationship to patient: Emergency Contact    Best call back number: [phone]    Patient is needing: PATIENT FORGOT TO ADDRESS THIS ISSUE WHEN SHE SAW ISAIAS THIS MORNING.  PATIENT HAS HAD ISSUES WITH MEMORY LOSS. PATIENT ASKS IF THERE IS A MEDICATION THAT CAN BE PRESCRIBED TO HELP   PLEASE ADVISE

## 2023-04-21 NOTE — TELEPHONE ENCOUNTER
Attempted to call patient, no voicemail available    HUB TO READ    Susanne said We will discuss at evaluate at her next visit, thanks

## 2023-05-11 ENCOUNTER — TELEPHONE (OUTPATIENT)
Dept: INTERNAL MEDICINE | Facility: CLINIC | Age: 83
End: 2023-05-11

## 2023-05-11 NOTE — TELEPHONE ENCOUNTER
Jadon from [facility] is reporting elevated bp 168/100 she forgot her morning medication just took medication no other symptoms

## 2023-05-15 NOTE — PROGRESS NOTES
Transitional Care Follow Up Visit  Subjective     Dee Watts is a 82 y.o. female who presents for a transitional care management visit.    Within 48 business hours after discharge our office contacted her via telephone to coordinate her care and needs.      I reviewed and discussed the details of that call along with the discharge summary, hospital problems, inpatient lab results, inpatient diagnostic studies, and consultation reports with Dee.     Current outpatient and discharge medications have been reconciled for the patient.  Reviewed by: MARTA Curry          4/16/2023     6:41 PM   Date of TCM Phone Call   [facility]    Date of Discharge 4/16/2023   Discharge Disposition Home or Self Care     Risk for Readmission (LACE) No data recorded    History of Present Illness   Course During Hospital Stay:       The following portions of the patient's history were reviewed and updated as appropriate: allergies, current medications, past family history, past medical history, past social history, past surgical history and problem list.    Review of Systems   HENT: Positive for congestion and postnasal drip.    Eyes: Positive for pain (pressure behind right eye).   Respiratory: Negative for cough, chest tightness and shortness of breath.    Cardiovascular: Negative for chest pain, palpitations and leg swelling.   Gastrointestinal: Negative for abdominal pain.   Musculoskeletal: Positive for arthralgias.       Objective   Physical Exam  Constitutional:       Appearance: She is well-developed. She is not ill-appearing.   HENT:      Head: Normocephalic.      Right Ear: Hearing, tympanic membrane and external ear normal.      Left Ear: Hearing, tympanic membrane and external ear normal.      Nose: Nose normal. No nasal deformity, mucosal edema or rhinorrhea.      Right Sinus: No maxillary sinus tenderness or frontal sinus tenderness.      Left Sinus: No maxillary sinus  tenderness or frontal sinus tenderness.      Mouth/Throat:      Dentition: Normal dentition.   Eyes:      General: Lids are normal.         Right eye: No discharge.         Left eye: No discharge.      Conjunctiva/sclera: Conjunctivae normal.      Right eye: No exudate.     Left eye: No exudate.  Neck:      Thyroid: No thyroid mass or thyromegaly.      Vascular: No carotid bruit.      Trachea: Trachea normal.   Cardiovascular:      Rate and Rhythm: Regular rhythm.      Pulses: Normal pulses.      Heart sounds: Normal heart sounds. No murmur heard.  Pulmonary:      Effort: No respiratory distress.      Breath sounds: Normal breath sounds. No decreased breath sounds, wheezing, rhonchi or rales.   Abdominal:      General: Bowel sounds are normal.      Palpations: Abdomen is soft.      Tenderness: There is no abdominal tenderness.   Musculoskeletal:      Cervical back: Normal range of motion. No edema.      Comments: Pt is in a wheelchair for assistance   Lymphadenopathy:      Head:      Right side of head: No submental, submandibular, tonsillar, preauricular, posterior auricular or occipital adenopathy.      Left side of head: No submental, submandibular, tonsillar, preauricular, posterior auricular or occipital adenopathy.   Skin:     General: Skin is warm and dry.      Nails: There is no clubbing.   Neurological:      Mental Status: She is alert.   Psychiatric:         Behavior: Behavior is cooperative.         Assessment & Plan   Diagnoses and all orders for this visit:    1. Pain in right eye (Primary)  -     MRI Brain With & Without Contrast; Future    2. Hypercalcemia    3. Primary hypertension    4. Acquired hypothyroidism    5. Nausea and vomiting, unspecified vomiting type  -     ondansetron ODT (ZOFRAN-ODT) 8 MG disintegrating tablet; Place 1 tablet on the tongue Every 12 (Twelve) Hours As Needed for Nausea or Vomiting.  Dispense: 30 tablet; Refill: 0  -     omeprazole (priLOSEC) 40 MG capsule; Take 1 capsule  by mouth Daily.  Dispense: 30 capsule; Refill: 2    6. Epigastric pain

## 2023-05-16 ENCOUNTER — TELEPHONE (OUTPATIENT)
Dept: INTERNAL MEDICINE | Facility: CLINIC | Age: 83
End: 2023-05-16
Payer: MEDICARE

## 2023-05-16 NOTE — TELEPHONE ENCOUNTER
Meli from [facility] called and asking for orders extending PT for once a week for 3 weeks.  Also she was looking for Neurologist but hasn't found one and was wanting know of someone Susanne recommends.  Call back number is [phone].

## 2023-05-16 NOTE — TELEPHONE ENCOUNTER
Please clarify with patient how her blood pressure is running now that she has restarted her medication.

## 2023-05-31 ENCOUNTER — TELEPHONE (OUTPATIENT)
Dept: INTERNAL MEDICINE | Facility: CLINIC | Age: 83
End: 2023-05-31

## 2023-05-31 NOTE — TELEPHONE ENCOUNTER
Caller: Dee Watts    Relationship to patient: Self    Best call back number: [phone]    Patient is needing: PATIENT STATES DISCHARGE PAPERS FROM REHAB NOTED THAT PATIENT HAS STAGE 3 KIDNEY DISEASE. PATIENT STATES SHE WAS NEVER ADVISED OF THIS.   ASKS TO SPEAK TO ISAIAS VAZQUEZ

## 2023-06-02 DIAGNOSIS — I10 ESSENTIAL HYPERTENSION: ICD-10-CM

## 2023-06-02 DIAGNOSIS — F41.9 ANXIETY: ICD-10-CM

## 2023-06-02 DIAGNOSIS — M81.0 OSTEOPOROSIS, UNSPECIFIED OSTEOPOROSIS TYPE, UNSPECIFIED PATHOLOGICAL FRACTURE PRESENCE: ICD-10-CM

## 2023-06-02 DIAGNOSIS — E03.9 ACQUIRED HYPOTHYROIDISM: Chronic | ICD-10-CM

## 2023-06-02 DIAGNOSIS — E03.9 ACQUIRED HYPOTHYROIDISM: ICD-10-CM

## 2023-06-02 NOTE — TELEPHONE ENCOUNTER
Caller: [facility] at [place] - [place] [id number] [facility] [address] [address] - [phone] [facility] [phone] FX    Relationship: Pharmacy      Requested Prescriptions:   Requested Prescriptions     Pending Prescriptions Disp Refills   • valsartan (DIOVAN) 160 MG tablet       Sig: Take 1 tablet by mouth Daily.   • escitalopram (LEXAPRO) 20 MG tablet 90 tablet 1     Sig: TAKE 1 TABLET BY MOUTH DAILY   • fexofenadine (ALLEGRA) 180 MG tablet 90 tablet 1     Sig: Take 1 tablet by mouth Daily.   • raloxifene (EVISTA) 60 MG tablet 90 tablet 3     Sig: Take 1 tablet by mouth Daily.   • verapamil SR (CALAN-SR) 240 MG CR tablet 90 tablet 1     Sig: Take 1 tablet by mouth Daily.   • levothyroxine (SYNTHROID, LEVOTHROID) 100 MCG tablet       Sig: Take 1 tablet by mouth Take As Directed. Tale by mouth 5 times weekly Monday-Friday   • levothyroxine (SYNTHROID, LEVOTHROID) 88 MCG tablet       Sig: Take 1 tablet by mouth 2 (Two) Times a Week. Saturday & Sunday        Pharmacy where request should be sent: [facility] AT [place] - [place], [place] - [facility] - [phone] [facility] [phone] FX     Last office visit with prescribing clinician: 4/20/2023   Last telemedicine visit with prescribing clinician: Visit date not found   Next office visit with prescribing clinician: 6/13/2023         Lizzy Quinones   06/02/23 16:00 EDT

## 2023-06-06 RX ORDER — VALSARTAN 160 MG/1
160 TABLET ORAL
Qty: 90 TABLET | Refills: 1 | Status: SHIPPED | OUTPATIENT
Start: 2023-06-06

## 2023-06-06 RX ORDER — RALOXIFENE HYDROCHLORIDE 60 MG/1
60 TABLET, FILM COATED ORAL DAILY
Qty: 90 TABLET | Refills: 1 | Status: SHIPPED | OUTPATIENT
Start: 2023-06-06

## 2023-06-06 RX ORDER — FEXOFENADINE HCL 180 MG/1
180 TABLET ORAL DAILY
Qty: 90 TABLET | Refills: 1 | Status: SHIPPED | OUTPATIENT
Start: 2023-06-06

## 2023-06-06 RX ORDER — ESCITALOPRAM OXALATE 20 MG/1
TABLET ORAL
Qty: 90 TABLET | Refills: 1 | Status: SHIPPED | OUTPATIENT
Start: 2023-06-06

## 2023-06-06 RX ORDER — LEVOTHYROXINE SODIUM 88 UG/1
88 TABLET ORAL 2 TIMES WEEKLY
Qty: 24 TABLET | Refills: 1 | Status: SHIPPED | OUTPATIENT
Start: 2023-06-08

## 2023-06-06 RX ORDER — VERAPAMIL HYDROCHLORIDE 240 MG/1
240 TABLET, FILM COATED, EXTENDED RELEASE ORAL DAILY
Qty: 90 TABLET | Refills: 1 | Status: SHIPPED | OUTPATIENT
Start: 2023-06-06

## 2023-06-06 RX ORDER — LEVOTHYROXINE SODIUM 0.1 MG/1
100 TABLET ORAL TAKE AS DIRECTED
Qty: 60 TABLET | Refills: 1 | Status: SHIPPED | OUTPATIENT
Start: 2023-06-06

## 2023-06-06 NOTE — TELEPHONE ENCOUNTER
Her kidney function has been decreased with previous labs but was stable most recently-we will discuss this in further detail at her appointment next week. Thanks.

## 2023-06-13 ENCOUNTER — OFFICE VISIT (OUTPATIENT)
Dept: INTERNAL MEDICINE | Facility: CLINIC | Age: 83
End: 2023-06-13
Payer: MEDICARE

## 2023-06-13 VITALS
HEART RATE: 96 BPM | WEIGHT: 201.8 LBS | OXYGEN SATURATION: 95 % | SYSTOLIC BLOOD PRESSURE: 136 MMHG | BODY MASS INDEX: 39.62 KG/M2 | TEMPERATURE: 97.5 F | HEIGHT: 60 IN | DIASTOLIC BLOOD PRESSURE: 84 MMHG

## 2023-06-13 DIAGNOSIS — E83.52 HYPERCALCEMIA: ICD-10-CM

## 2023-06-13 DIAGNOSIS — E03.9 ACQUIRED HYPOTHYROIDISM: Chronic | ICD-10-CM

## 2023-06-13 DIAGNOSIS — R41.3 MEMORY LOSS: Primary | ICD-10-CM

## 2023-06-13 DIAGNOSIS — H57.11 EYE PAIN, RIGHT: ICD-10-CM

## 2023-06-13 DIAGNOSIS — J06.9 VIRAL URI WITH COUGH: ICD-10-CM

## 2023-06-13 DIAGNOSIS — I10 PRIMARY HYPERTENSION: Chronic | ICD-10-CM

## 2023-06-13 PROBLEM — S06.5XAA SUBDURAL HEMATOMA: Status: RESOLVED | Noted: 2022-01-16 | Resolved: 2023-06-13

## 2023-06-13 LAB
ALBUMIN SERPL-MCNC: 3.8 G/DL (ref 3.5–5.2)
ALBUMIN/GLOB SERPL: 1.3 G/DL
ALP SERPL-CCNC: 57 U/L (ref 39–117)
ALT SERPL-CCNC: 13 U/L (ref 1–33)
AST SERPL-CCNC: 11 U/L (ref 1–32)
BILIRUB SERPL-MCNC: 0.3 MG/DL (ref 0–1.2)
BUN SERPL-MCNC: 17 MG/DL (ref 8–23)
BUN/CREAT SERPL: 15.7 (ref 7–25)
CALCIUM SERPL-MCNC: 10.7 MG/DL (ref 8.6–10.5)
CHLORIDE SERPL-SCNC: 107 MMOL/L (ref 98–107)
CO2 SERPL-SCNC: 25.2 MMOL/L (ref 22–29)
CREAT SERPL-MCNC: 1.08 MG/DL (ref 0.57–1)
EGFRCR SERPLBLD CKD-EPI 2021: 50.4 ML/MIN/1.73
GLOBULIN SER CALC-MCNC: 3 GM/DL
GLUCOSE SERPL-MCNC: 109 MG/DL (ref 65–99)
POTASSIUM SERPL-SCNC: 4.5 MMOL/L (ref 3.5–5.2)
PROT SERPL-MCNC: 6.8 G/DL (ref 6–8.5)
SODIUM SERPL-SCNC: 141 MMOL/L (ref 136–145)

## 2023-06-13 PROCEDURE — 3079F DIAST BP 80-89 MM HG: CPT | Performed by: NURSE PRACTITIONER

## 2023-06-13 PROCEDURE — 1159F MED LIST DOCD IN RCRD: CPT | Performed by: NURSE PRACTITIONER

## 2023-06-13 PROCEDURE — 1160F RVW MEDS BY RX/DR IN RCRD: CPT | Performed by: NURSE PRACTITIONER

## 2023-06-13 PROCEDURE — 99214 OFFICE O/P EST MOD 30 MIN: CPT | Performed by: NURSE PRACTITIONER

## 2023-06-13 PROCEDURE — 3075F SYST BP GE 130 - 139MM HG: CPT | Performed by: NURSE PRACTITIONER

## 2023-06-13 RX ORDER — BENZONATATE 200 MG/1
200 CAPSULE ORAL 3 TIMES DAILY PRN
Qty: 30 CAPSULE | Refills: 0 | Status: SHIPPED | OUTPATIENT
Start: 2023-06-13

## 2023-06-14 PROBLEM — R41.3 MEMORY LOSS: Status: ACTIVE | Noted: 2023-06-14

## 2023-06-14 PROBLEM — R51.9 RIGHT-SIDED HEADACHE: Status: RESOLVED | Noted: 2023-02-26 | Resolved: 2023-06-14

## 2023-06-14 NOTE — ASSESSMENT & PLAN NOTE
Notes gradual decline in memory and balance, will follow results of MRI of brain but also send for neuropsychological testing to further evaluate.

## 2023-06-14 NOTE — PROGRESS NOTES
"Chief Complaint  Weakness - Generalized, Memory Loss, and Elevated calcium    Subjective        Dee Mac presents to [facility] for f/u regarding weakness, elevated calcium and c/o memory loss.    History of Present Illness  Her MRI of the brain to evaluate right thigh pain and memory changes has been scheduled but she has not yet had due to a scheduling conflict.  She lives in independent living and does have a  who comes in every Tuesday.  She presents with her daughter-in-law today for follow-up and does note improvement in strength since receiving physical therapy.  Her calcium was elevated at 11 during her recent hospitalization and HCTZ was discontinued.  She denies any edema of lower extremity since stopping HCTZ.  She does complain of a lingering cough and congestion which is gradually improving, denies fever or chills.    Objective   Vital Signs:  /84 (BP Location: Left arm, Patient Position: Sitting, Cuff Size: Large Adult)   Pulse 96   Temp 97.5 °F (36.4 °C) (Temporal)   Ht 152.4 cm (60\")   Wt 91.5 kg (201 lb 12.8 oz)   SpO2 95%   BMI 39.41 kg/m²   Estimated body mass index is 39.41 kg/m² as calculated from the following:    Height as of this encounter: 152.4 cm (60\").    Weight as of this encounter: 91.5 kg (201 lb 12.8 oz).       Oh  Physical Exam  Constitutional:       Appearance: She is well-developed. She is not ill-appearing.   HENT:      Head: Normocephalic.      Right Ear: Hearing, tympanic membrane and external ear normal.      Left Ear: Hearing, tympanic membrane and external ear normal.      Nose: Nose normal. No nasal deformity, mucosal edema or rhinorrhea.      Right Sinus: No maxillary sinus tenderness or frontal sinus tenderness.      Left Sinus: No maxillary sinus tenderness or frontal sinus tenderness.      Mouth/Throat:      Dentition: Normal dentition.   Eyes:      General: Lids are normal.         Right eye: No discharge.    "      Left eye: No discharge.      Conjunctiva/sclera: Conjunctivae normal.      Right eye: No exudate.     Left eye: No exudate.  Neck:      Thyroid: No thyroid mass or thyromegaly.      Vascular: No carotid bruit.      Trachea: Trachea normal.   Cardiovascular:      Rate and Rhythm: Regular rhythm.      Pulses: Normal pulses.      Heart sounds: Normal heart sounds. No murmur heard.  Pulmonary:      Effort: No respiratory distress.      Breath sounds: Normal breath sounds. No decreased breath sounds, wheezing, rhonchi or rales.   Abdominal:      General: Bowel sounds are normal.      Palpations: Abdomen is soft.      Tenderness: There is no abdominal tenderness.   Musculoskeletal:      Cervical back: Normal range of motion. No edema.      Comments: Patient is using rollator for assistance   Lymphadenopathy:      Head:      Right side of head: No submental, submandibular, tonsillar, preauricular, posterior auricular or occipital adenopathy.      Left side of head: No submental, submandibular, tonsillar, preauricular, posterior auricular or occipital adenopathy.   Skin:     General: Skin is warm and dry.      Nails: There is no clubbing.   Neurological:      Mental Status: She is alert.   Psychiatric:         Behavior: Behavior is cooperative.      Result Review :  The following data was reviewed by: MARTA Brink on 06/13/2023:  Common labs          4/3/2023    05:44 4/5/2023    05:28 6/13/2023    12:20   Common Labs   Glucose 105  114  109    BUN 26  23  17    Creatinine 1.22  0.93  1.08    Sodium 139  137  141    Potassium 4.0  4.6  4.5    Chloride 106  102  107    Calcium 9.8  9.8  10.7    Total Protein   6.8    Albumin   3.8    Total Bilirubin   0.3    Alkaline Phosphatase   57    AST (SGOT)   11    ALT (SGPT)   13    WBC 9.28  8.60     Hemoglobin 12.5  12.1     Hematocrit 39.4  36.6     Platelets 216  177                    Assessment and Plan   Diagnoses and all orders for this visit:    1. Memory loss  (Primary)  Assessment & Plan:  Notes gradual decline in memory and balance, will follow results of MRI of brain but also send for neuropsychological testing to further evaluate.    Orders:  -     Ambulatory Referral to Neuropsychology    2. Primary hypertension  Assessment & Plan:  BP is controlled on valsartan and verapamil daily, continue to monitor.    Orders:  -     Comprehensive Metabolic Panel    3. Acquired hypothyroidism  Assessment & Plan:  She is on alternating Synthroid 88 mcg and 100 mcg daily for replacement.   Lab Results   Component Value Date    TSH 1.990 04/02/2023             4. Eye pain, right  Comments:  will follow results of MRI    5. Viral URI with cough  Comments:  will treat lingering cough with Tessalon & continue to monitor  Orders:  -     benzonatate (TESSALON) 200 MG capsule; Take 1 capsule by mouth 3 (Three) Times a Day As Needed for Cough.  Dispense: 30 capsule; Refill: 0    6. Hypercalcemia  Comments:  attributed to dehydration/HCTZ use, recheck today.           Follow Up   Return if symptoms worsen or fail to improve, for Next scheduled follow up.  Patient was given instructions and counseling regarding her condition or for health maintenance advice. Please see specific information pulled into the AVS if appropriate.

## 2023-06-14 NOTE — ASSESSMENT & PLAN NOTE
She is on alternating Synthroid 88 mcg and 100 mcg daily for replacement.   Lab Results   Component Value Date    TSH 1.990 04/02/2023

## 2023-07-28 DIAGNOSIS — I10 ESSENTIAL HYPERTENSION: ICD-10-CM

## 2023-07-31 RX ORDER — VERAPAMIL HYDROCHLORIDE 240 MG/1
240 TABLET, FILM COATED, EXTENDED RELEASE ORAL DAILY
Qty: 90 TABLET | Refills: 1 | Status: SHIPPED | OUTPATIENT
Start: 2023-07-31

## 2023-08-21 ENCOUNTER — APPOINTMENT (OUTPATIENT)
Dept: GENERAL RADIOLOGY | Facility: HOSPITAL | Age: 83
End: 2023-08-21
Payer: MEDICARE

## 2023-08-21 ENCOUNTER — HOSPITAL ENCOUNTER (OUTPATIENT)
Dept: MRI IMAGING | Facility: HOSPITAL | Age: 83
Discharge: HOME | End: 2023-08-21
Payer: MEDICARE

## 2023-08-21 DIAGNOSIS — H57.11 PAIN IN RIGHT EYE: ICD-10-CM

## 2023-08-21 PROCEDURE — 70553 MRI BRAIN STEM W/O & W/DYE: CPT

## 2023-08-21 PROCEDURE — 72072 X-RAY EXAM THORAC SPINE 3VWS: CPT

## 2023-08-21 PROCEDURE — 0 GADOBENATE DIMEGLUMINE 529 MG/ML SOLUTION: Performed by: NURSE PRACTITIONER

## 2023-08-21 PROCEDURE — 71101 X-RAY EXAM UNILAT RIBS/CHEST: CPT

## 2023-08-21 PROCEDURE — A9577 INJ MULTIHANCE: HCPCS | Performed by: NURSE PRACTITIONER

## 2023-08-21 RX ADMIN — GADOBENATE DIMEGLUMINE 19 ML: 529 INJECTION, SOLUTION INTRAVENOUS at 11:17

## 2023-08-25 ENCOUNTER — TELEPHONE (OUTPATIENT)
Dept: INTERNAL MEDICINE | Facility: CLINIC | Age: 83
End: 2023-08-25

## 2023-08-25 NOTE — TELEPHONE ENCOUNTER
Caller: Dee Watts    Relationship to patient: Self    Best call back number: [phone]    PATIENT STATES THAT SHE IS CURRENTLY ADMITTED TO [facility] FOR UTI. PATIENT UNSURE WHEN SHE WILL BE DISCHARGE. PATIENT STATES THAT SHE WAS ADMITTED AROUND LAST FRIDAY 8/18/2023.

## 2023-09-01 ENCOUNTER — TELEPHONE (OUTPATIENT)
Dept: INTERNAL MEDICINE | Facility: CLINIC | Age: 83
End: 2023-09-01

## 2023-09-01 NOTE — TELEPHONE ENCOUNTER
Caller: BALTAZAR    Relationship: Home Health    Best call back number: [phone]    What orders are you requesting (i.e. lab or imaging): PT, OT AND SKILLED NURSING     In what timeframe would the patient need to come in: AS SOON AS POSSIBLE     Additional notes: JIN IS REQUESTING VERBAL ORDERS FOR PHYSICAL THERAPY, OCCUPATIONAL THERAPY AND SKILLED NURSING.

## 2023-09-06 ENCOUNTER — TELEPHONE (OUTPATIENT)
Dept: INTERNAL MEDICINE | Facility: CLINIC | Age: 83
End: 2023-09-06

## 2023-09-06 NOTE — TELEPHONE ENCOUNTER
Hub staff attempted to follow warm transfer process and was unsuccessful     Caller: Dee Watts    Relationship to patient: Self    Best call back number: [phone]    Patient is needing: THE PATIENT NEEDS A HOSPITAL FOLLOW-UP SCHEDULED WITH MARTA CASSIDY. THE PATIENT WAS RECENTLY ADMITTED TO [facility] ON 08/22 AND DISCHARGED ON 08/31 FOR A UTI. THE PATIENT DOES NOT WANT TO SEE ANY OTHER PROVIDER. PLEASE ADVISE.

## 2023-09-07 ENCOUNTER — TELEPHONE (OUTPATIENT)
Dept: INTERNAL MEDICINE | Facility: CLINIC | Age: 83
End: 2023-09-07

## 2023-09-07 NOTE — TELEPHONE ENCOUNTER
Caller: NICOLE NENITA LEDESMA    Relationship: [facility]    Best call back number:     [phone]     What orders are you requesting (i.e. lab or imaging): VERBAL ORDERS FOR PHYSICAL THERAPY    In what timeframe would the patient need to come in: TWICE A WEEK FOR 3 WEEKS, THEN ONCE A WEEK FOR 2 WEEKS    Where will you receive your lab/imaging services: IN HOME    Additional notes: NICOLE NENITA LEDESMA IS ASKING FOR A CALL BACK WITH VERBAL ORDERS FOR PHYSICAL THERAPY AS REQUESTED ABOVE IF APPROPRIATE

## 2023-09-13 ENCOUNTER — TELEPHONE (OUTPATIENT)
Dept: INTERNAL MEDICINE | Facility: CLINIC | Age: 83
End: 2023-09-13
Payer: MEDICARE

## 2023-09-13 NOTE — TELEPHONE ENCOUNTER
Caller: FRANCO    Relationship: [facility]    Best call back number: [phone]     What orders are you requesting (i.e. lab or imaging): SPEECH THERAPY    Additional notes: FRANCO STATES THAT PATIENT HAS BEEN EXHIBITING DIFFICULTY WITH WORD FINDING. THEY WOULD LIKE TO SEND A SPEECH THERAPIST TO EVALUATE FURTHER. REQUESTS CALL BACK WITH VERBAL ORDERS

## 2023-09-22 ENCOUNTER — OFFICE VISIT (OUTPATIENT)
Dept: INTERNAL MEDICINE | Facility: CLINIC | Age: 83
End: 2023-09-22
Payer: MEDICARE

## 2023-09-22 VITALS
OXYGEN SATURATION: 96 % | HEART RATE: 82 BPM | HEIGHT: 60 IN | WEIGHT: 197.2 LBS | DIASTOLIC BLOOD PRESSURE: 92 MMHG | BODY MASS INDEX: 38.72 KG/M2 | SYSTOLIC BLOOD PRESSURE: 142 MMHG

## 2023-09-22 DIAGNOSIS — E03.9 ACQUIRED HYPOTHYROIDISM: Chronic | ICD-10-CM

## 2023-09-22 DIAGNOSIS — G89.29 CHRONIC RIGHT-SIDED LOW BACK PAIN WITHOUT SCIATICA: Chronic | ICD-10-CM

## 2023-09-22 DIAGNOSIS — I10 PRIMARY HYPERTENSION: Chronic | ICD-10-CM

## 2023-09-22 DIAGNOSIS — Z00.00 MEDICARE ANNUAL WELLNESS VISIT, SUBSEQUENT: Primary | ICD-10-CM

## 2023-09-22 DIAGNOSIS — M54.50 CHRONIC RIGHT-SIDED LOW BACK PAIN WITHOUT SCIATICA: Chronic | ICD-10-CM

## 2023-09-22 LAB
ALBUMIN SERPL-MCNC: 4.5 G/DL (ref 3.5–5.2)
ALBUMIN/GLOB SERPL: 1.7 G/DL
ALP SERPL-CCNC: 60 U/L (ref 39–117)
ALT SERPL-CCNC: 10 U/L (ref 1–33)
AST SERPL-CCNC: 14 U/L (ref 1–32)
BILIRUB SERPL-MCNC: <0.2 MG/DL (ref 0–1.2)
BUN SERPL-MCNC: 20 MG/DL (ref 8–23)
BUN/CREAT SERPL: 18.7 (ref 7–25)
CALCIUM SERPL-MCNC: 10.9 MG/DL (ref 8.6–10.5)
CHLORIDE SERPL-SCNC: 110 MMOL/L (ref 98–107)
CO2 SERPL-SCNC: 23.8 MMOL/L (ref 22–29)
CREAT SERPL-MCNC: 1.07 MG/DL (ref 0.57–1)
EGFRCR SERPLBLD CKD-EPI 2021: 51 ML/MIN/1.73
ERYTHROCYTE [DISTWIDTH] IN BLOOD BY AUTOMATED COUNT: 13.1 % (ref 12.3–15.4)
GLOBULIN SER CALC-MCNC: 2.7 GM/DL
GLUCOSE SERPL-MCNC: 108 MG/DL (ref 65–99)
HCT VFR BLD AUTO: 42.9 % (ref 34–46.6)
HGB BLD-MCNC: 14.1 G/DL (ref 12–15.9)
MCH RBC QN AUTO: 29.7 PG (ref 26.6–33)
MCHC RBC AUTO-ENTMCNC: 32.9 G/DL (ref 31.5–35.7)
MCV RBC AUTO: 90.5 FL (ref 79–97)
PLATELET # BLD AUTO: 245 10*3/MM3 (ref 140–450)
POTASSIUM SERPL-SCNC: 4.9 MMOL/L (ref 3.5–5.2)
PROT SERPL-MCNC: 7.2 G/DL (ref 6–8.5)
RBC # BLD AUTO: 4.74 10*6/MM3 (ref 3.77–5.28)
SODIUM SERPL-SCNC: 143 MMOL/L (ref 136–145)
T4 FREE SERPL-MCNC: 1.01 NG/DL (ref 0.93–1.7)
TSH SERPL DL<=0.005 MIU/L-ACNC: 3.17 UIU/ML (ref 0.27–4.2)
WBC # BLD AUTO: 10.1 10*3/MM3 (ref 3.4–10.8)

## 2023-09-22 NOTE — PROGRESS NOTES
The ABCs of the Annual Wellness Visit  Subsequent Medicare Wellness Visit    Subjective    Dee Watts is a 83 y.o. female who presents for a Subsequent Medicare Wellness Visit.    The following portions of the patient's history were reviewed and   updated as appropriate: allergies, current medications, past family history, past medical history, past social history, past surgical history, and problem list.    Compared to one year ago, the patient feels her physical   health is worse.    Compared to one year ago, the patient feels her mental   health is the same.    Recent Hospitalizations:  This patient has had a [facility], [place], operated by [facility] admission record on file within the last 365 days.    Current Medical Providers:  Patient Care Team:  Susanne Sharma APRN as PCP - General (Internal Medicine)  Pedro Sy MD as Surgeon (Orthopedic Surgery)  Keith Dorsey MD as Surgeon (Orthopedic Surgery)  Kira Beal MD (Dermatology)  Jadon Jones MD as Consulting Physician (Ophthalmology)  Edgar Tello MD as Consulting Physician (Otolaryngology)    Outpatient Medications Prior to Visit   Medication Sig Dispense Refill    acetaminophen (TYLENOL) 325 MG tablet Take 2 tablets by mouth Every 4 (Four) Hours As Needed for Mild Pain.      albuterol sulfate  (90 Base) MCG/ACT inhaler Inhale 2 puffs Every 4 (Four) Hours As Needed for Wheezing. 18 g 6    baclofen (LIORESAL) 20 MG tablet Take 0.5 tablets by mouth 3 (Three) Times a Day As Needed for Muscle Spasms. 60 tablet 0    bisacodyl (DULCOLAX) 10 MG suppository Insert 1 suppository into the rectum Daily As Needed for Constipation.      escitalopram (LEXAPRO) 20 MG tablet TAKE 1 TABLET BY MOUTH DAILY 90 tablet 1    fexofenadine (ALLEGRA) 180 MG tablet Take 1 tablet by mouth Daily. 90 tablet 1    levothyroxine (SYNTHROID, LEVOTHROID) 100 MCG tablet Take 1 tablet by mouth Take As Directed. Tale by mouth 5 times weekly Monday-Friday 60 tablet 1     levothyroxine (SYNTHROID, LEVOTHROID) 88 MCG tablet Take 1 tablet by mouth 2 (Two) Times a Week. Saturday & Sunday 24 tablet 1    melatonin 5 MG tablet tablet Take 1 tablet by mouth At Night As Needed (Sleep).      nystatin (MYCOSTATIN) 351625 UNIT/GM cream Apply 1 application topically to the appropriate area as directed Every 12 (Twelve) Hours.      omeprazole (priLOSEC) 40 MG capsule Take 1 capsule by mouth Daily. 30 capsule 2    ondansetron ODT (ZOFRAN-ODT) 8 MG disintegrating tablet Place 1 tablet on the tongue Every 12 (Twelve) Hours As Needed for Nausea or Vomiting. 30 tablet 0    polyethylene glycol 17 g packet Take 17 g by mouth Daily.      raloxifene (EVISTA) 60 MG tablet Take 1 tablet by mouth Daily. 90 tablet 1    sennosides-docusate (PERICOLACE) 8.6-50 MG per tablet Take 2 tablets by mouth 2 (Two) Times a Day.      valsartan (DIOVAN) 160 MG tablet Take 1 tablet by mouth Daily. 90 tablet 1    verapamil SR (CALAN-SR) 240 MG CR tablet TAKE 1 TABLET BY MOUTH DAILY 90 tablet 1    benzonatate (TESSALON) 200 MG capsule Take 1 capsule by mouth 3 (Three) Times a Day As Needed for Cough. 30 capsule 0     No facility-administered medications prior to visit.       No opioid medication identified on active medication list. I have reviewed chart for other potential  high risk medication/s and harmful drug interactions in the elderly.        Aspirin is not on active medication list.  Aspirin use is not indicated based on review of current medical condition/s. Risk of harm outweighs potential benefits.  .    Patient Active Problem List   Diagnosis    DDD (degenerative disc disease), lumbar    Osteoarthritis of hip    Lumbosacral neuritis    Lumbar canal stenosis    Hypothyroidism    Hypertension    Mood disorder    Vitamin D deficiency    IBS (irritable bowel syndrome)    Osteoporosis    Migraines    Chronic breast pain    Status post total hip replacement, right    Mixed stress and urge urinary incontinence     "Allergic rhinitis    Episodic paroxysmal hemicrania, not intractable    Vertigo    Obesity, morbid    Hypercalcemia    Chronic right-sided low back pain without sciatica    AMS (altered mental status)    Nausea and vomiting    Weakness    Pain in right eye    Memory loss     Advance Care Planning   Advance Care Planning     Advance Directive is not on file.  ACP discussion was held with the patient during this visit. Patient has an advance directive (not in EMR), copy requested.     Objective    Vitals:    23 0914   BP: 142/92   BP Location: Left arm   Patient Position: Sitting   Cuff Size: Large Adult   Pulse: 82   SpO2: 96%   Weight: 89.4 kg (197 lb 3.2 oz)   Height: 152.4 cm (60\")   PainSc:   4   PainLoc: Back     Estimated body mass index is 38.51 kg/m² as calculated from the following:    Height as of this encounter: 152.4 cm (60\").    Weight as of this encounter: 89.4 kg (197 lb 3.2 oz).           Does the patient have evidence of cognitive impairment? No          HEALTH RISK ASSESSMENT    Smoking Status:  Social History     Tobacco Use   Smoking Status Former    Types: Cigarettes    Quit date: 1990    Years since quittin.7   Smokeless Tobacco Never     Alcohol Consumption:  Social History     Substance and Sexual Activity   Alcohol Use Yes    Comment: 1 DRINK PER WEEK     Fall Risk Screen:    KARLOS Fall Risk Assessment was completed, and patient is at HIGH risk for falls. Assessment completed on:2023    Depression Screenin/22/2023     9:24 AM   PHQ-2/PHQ-9 Depression Screening   Little Interest or Pleasure in Doing Things 1-->several days   Feeling Down, Depressed or Hopeless 1-->several days   Trouble Falling or Staying Asleep, or Sleeping Too Much 0-->not at all   Feeling Tired or Having Little Energy 0-->not at all   Poor Appetite or Overeating 1-->several days   Feeling Bad about Yourself - or that You are a Failure or Have Let Yourself or Your Family Down 0-->not at all "   Trouble Concentrating on Things, Such as Reading the Newspaper or Watching Television 0-->not at all   Moving or Speaking So Slowly that Other People Could Have Noticed? Or the Opposite - Being So Fidgety 0-->not at all   Thoughts that You Would be Better Off Dead or of Hurting Yourself in Some Way 0-->not at all   PHQ-9: Brief Depression Severity Measure Score 3   If You Checked Off Any Problems, How Difficult Have These Problems Made It For You to Do Your Work, Take Care of Things at Home, or Get Along with Other People? not difficult at all       Health Habits and Functional and Cognitive Screenin/22/2023     9:24 AM   Functional & Cognitive Status   Do you have difficulty preparing food and eating? No   Do you have difficulty bathing yourself, getting dressed or grooming yourself? Yes   Do you have difficulty using the toilet? No   Do you have difficulty moving around from place to place? No   Do you have trouble with steps or getting out of a bed or a chair? No   Current Diet Well Balanced Diet   Dental Exam Up to date   Eye Exam Up to date   Exercise (times per week) 3 times per week   Current Exercises Include Walking        Exercise Comment Rehab   Do you need help using the phone?  No   Are you deaf or do you have serious difficulty hearing?  No   Do you need help to go to places out of walking distance? Yes   Do you need help shopping? Yes   Do you need help preparing meals?  Yes   Do you need help with housework?  Yes   Do you need help with laundry? Yes   Do you need help taking your medications? No   Do you need help managing money? No   Do you ever drive or ride in a car without wearing a seat belt? No   Have you felt unusual stress, anger or loneliness in the last month? Yes   Who do you live with? Community   If you need help, do you have trouble finding someone available to you? No   Have you been bothered in the last four weeks by sexual problems? No   Do you have difficulty  concentrating, remembering or making decisions? No       Age-appropriate Screening Schedule:  Refer to the list below for future screening recommendations based on patient's age, sex and/or medical conditions. Orders for these recommended tests are listed in the plan section. The patient has been provided with a written plan.    Health Maintenance   Topic Date Due    ZOSTER VACCINE (2 of 3) 08/22/2013    DXA SCAN  07/20/2023    INFLUENZA VACCINE  08/01/2023    COVID-19 Vaccine (4 - 2023-24 season) 09/01/2023    ANNUAL WELLNESS VISIT  09/14/2023    LIPID PANEL  02/20/2024    BMI FOLLOWUP  02/20/2024    TDAP/TD VACCINES (3 - Td or Tdap) 01/16/2032    Pneumococcal Vaccine 65+  Completed                  CMS Preventative Services Quick Reference  Risk Factors Identified During Encounter  Fall Risk-High or Moderate: Discussed Fall Prevention in the home  Immunizations Discussed/Encouraged: Influenza, Shingrix, and COVID19  The above risks/problems have been discussed with the patient.  Pertinent information has been shared with the patient in the After Visit Summary.  An After Visit Summary and PPPS were made available to the patient.    Follow Up:   Next Medicare Wellness visit to be scheduled in 1 year.       Additional E&M Note during same encounter follows:  Patient has multiple medical problems which are significant and separately identifiable that require additional work above and beyond the Medicare Wellness Visit.      Chief Complaint  Medicare Wellness-subsequent    Subjective        HPI  Dee Mac is also being seen today for f/u regarding recent hospital admission and to f/u on HTN, hypothyroidism and chronic low back pain.    The patient was admitted to [facility] from 08/22/2023 to 08/31/2023 due to acute onset of confusion. She was noted to have a urinary tract infection at the time, sx improved with antibiotic treatment. She confirms that she is currently receiving physical therapy  "services at home. She states that she will also be receiving speech therapy once a week. She is currently living in an assisted living facility.    She mentions that she experienced inflammation and pain on the right side of her neck upon arriving home from the hospital. Her symptoms have since resolved.    The patient is experiencing constant lower back pain. She notes that her pain worsens with increased ambulation. She is using a portable rollator walker. She states that the pain does not radiate into her bilateral lower extremities. She denies any recent falls.    She states that she is currently experiencing a tremor. She experiences vertigo daily. She reports experiencing increased forgetfulness and anxiety. She has recently experienced headaches but denies having one at this time. She is experiencing episodes of nausea. She denies experiencing any issues with allergies.    The patient has persistent pain in her right eye. She has been seen by ophthalmologist Dr. Jadon Jones.    She confirms that she is taking Synthroid 100 mcg. She takes Synthroid 88 mcg 2 days a week.  Review of Systems   Constitutional:  Positive for fatigue.   HENT:  Positive for congestion, postnasal drip and rhinorrhea.    Respiratory:  Negative for cough, chest tightness and shortness of breath.    Cardiovascular:  Negative for chest pain and leg swelling.   Gastrointestinal:  Negative for abdominal pain.   Musculoskeletal:  Positive for arthralgias.   Neurological:  Positive for tremors and weakness.   Psychiatric/Behavioral:          Decreased memory     Objective   Vital Signs:  /92 (BP Location: Left arm, Patient Position: Sitting, Cuff Size: Large Adult)   Pulse 82   Ht 152.4 cm (60\")   Wt 89.4 kg (197 lb 3.2 oz)   SpO2 96%   BMI 38.51 kg/m²     Physical Exam  Constitutional:       Appearance: She is well-developed. She is not ill-appearing.   HENT:      Head: Normocephalic.      Right Ear: Hearing, tympanic membrane and " external ear normal.      Left Ear: Hearing, tympanic membrane and external ear normal.      Nose: Nose normal. No nasal deformity, mucosal edema or rhinorrhea.      Right Sinus: No maxillary sinus tenderness or frontal sinus tenderness.      Left Sinus: No maxillary sinus tenderness or frontal sinus tenderness.      Mouth/Throat:      Dentition: Normal dentition.   Eyes:      General: Lids are normal.         Right eye: No discharge.         Left eye: No discharge.      Conjunctiva/sclera: Conjunctivae normal.      Right eye: No exudate.     Left eye: No exudate.  Neck:      Thyroid: No thyroid mass or thyromegaly.      Vascular: No carotid bruit.      Trachea: Trachea normal.   Cardiovascular:      Rate and Rhythm: Regular rhythm.      Pulses: Normal pulses.      Heart sounds: Normal heart sounds. No murmur heard.  Pulmonary:      Effort: No respiratory distress.      Breath sounds: Normal breath sounds. No decreased breath sounds, wheezing, rhonchi or rales.   Abdominal:      General: Bowel sounds are normal.      Palpations: Abdomen is soft.      Tenderness: There is no abdominal tenderness.   Musculoskeletal:      Cervical back: Normal range of motion. No edema.      Comments: Using rollator for assistance   Lymphadenopathy:      Head:      Right side of head: No submental, submandibular, tonsillar, preauricular, posterior auricular or occipital adenopathy.      Left side of head: No submental, submandibular, tonsillar, preauricular, posterior auricular or occipital adenopathy.   Skin:     General: Skin is warm and dry.      Nails: There is no clubbing.   Neurological:      Mental Status: She is alert.   Psychiatric:         Behavior: Behavior is cooperative.        The following data was reviewed by: MARTA Brink on 09/22/2023:  Common labs          8/23/2023    03:31 8/24/2023    03:07 9/22/2023    00:00   Common Labs   Glucose   108    BUN   20    Creatinine   1.07    Sodium   143    Potassium    4.9    Chloride   110    Calcium   10.9    Total Protein   7.2    Albumin   4.5    Total Bilirubin   <0.2    Alkaline Phosphatase   60    AST (SGOT)   14    ALT (SGPT)   10    WBC 11.77     8.73     10.10    Hemoglobin 13.6     13.3     14.1    Hematocrit 42.4     42.4     42.9    Platelets 208     181     245       Details          This result is from an external source.             Data reviewed : Recent hospitalization notes 8/22/23           Assessment and Plan   Diagnoses and all orders for this visit:    1. Medicare annual wellness visit, subsequent (Primary)    2. Chronic right-sided low back pain without sciatica  Assessment & Plan:  She c/o low back pain and stiffness without radicular sx, worse with prolonged standing; using rollator for assistance      3. Acquired hypothyroidism  Assessment & Plan:  She is alternating Synthroid 100 mcg with 88 mcg daily, recheck TSH.    Orders:  -     T4, Free  -     TSH    4. Primary hypertension  Assessment & Plan:  HCTZ previously discontinued due to elevated calcium; currently managed on valsartan and verapamil (BP mildly elevated today) which she will continue along with a low sodium diet.    Orders:  -     CBC (No Diff)  -     Comprehensive Metabolic Panel           Follow Up   Return in about 4 months (around 1/22/2024).    Patient was given instructions and counseling regarding her condition or for health maintenance advice. Please see specific information pulled into the AVS if appropriate.     Transcribed from ambient dictation for MARTA Brink by Aundrea Begum.  09/22/23   12:14 EDT    Patient or patient representative verbalized consent to the visit recording.  I have personally performed the services described in this document as transcribed by the above individual, and it is both accurate and complete.

## 2023-09-29 ENCOUNTER — TELEPHONE (OUTPATIENT)
Dept: INTERNAL MEDICINE | Facility: CLINIC | Age: 83
End: 2023-09-29

## 2023-09-29 NOTE — TELEPHONE ENCOUNTER
Caller: NICOLE    Relationship: [facility]     Best call back number: [phone]    What was the call regarding: CALLER STATED THEY WERE LOOKING TO GET ORDERS TO EXTEND THERAPY, ONCE A WEEK FOR 3 MORE WEEKS. PLEASE CALL AND ADVISE WHEN AVAILABLE

## 2023-09-30 NOTE — ASSESSMENT & PLAN NOTE
She c/o low back pain and stiffness without radicular sx, worse with prolonged standing; using rollator for assistance

## 2023-09-30 NOTE — ASSESSMENT & PLAN NOTE
HCTZ previously discontinued due to elevated calcium; currently managed on valsartan and verapamil (BP mildly elevated today) which she will continue along with a low sodium diet.

## 2023-09-30 NOTE — PATIENT INSTRUCTIONS
Medicare Wellness  Personal Prevention Plan of Service     Date of Office Visit:    Encounter Provider:  MARTA Brink  Place of Service:  [facility]  Patient Name: Dee Watts  :  1940    As part of the Medicare Wellness portion of your visit today, we are providing you with this personalized preventive plan of services (PPPS). This plan is based upon recommendations of the [place] Preventive Services Task Force (USPSTF) and the Advisory Committee on Immunization Practices (ACIP).    This lists the preventive care services that should be considered, and provides dates of when you are due. Items listed as completed are up-to-date and do not require any further intervention.    Health Maintenance   Topic Date Due    ZOSTER VACCINE (2 of 3) 2013    DXA SCAN  2023    INFLUENZA VACCINE  2023    COVID-19 Vaccine (4 - - season) 2023    ANNUAL WELLNESS VISIT  2023    LIPID PANEL  2024    BMI FOLLOWUP  2024    TDAP/TD VACCINES (3 - Td or Tdap) 2032    Pneumococcal Vaccine 65+  Completed       Orders Placed This Encounter   Procedures    T4, Free     Order Specific Question:   Release to patient     Answer:   Routine Release [[id number]]    TSH     Order Specific Question:   Release to patient     Answer:   Routine Release [[id number]]    CBC (No Diff)     Order Specific Question:   Release to patient     Answer:   Routine Release [[id number]]    Comprehensive Metabolic Panel     Order Specific Question:   Release to patient     Answer:   Routine Release [[id number]]       Return in about 4 months (around 2024).

## 2023-10-10 ENCOUNTER — TELEPHONE (OUTPATIENT)
Dept: INTERNAL MEDICINE | Facility: CLINIC | Age: 83
End: 2023-10-10

## 2023-10-10 NOTE — TELEPHONE ENCOUNTER
Caller: Dee Watts    Relationship: Self    Best call back number: [phone]    What was the call regarding: PATIENT STATED THAT THEY ARE WANTING TO KNOW WHAT SHOT THEY RECEIVED EARLIER THIS YEAR. STATED THEY ARE WANTING TO GET THE FLU SHOT DONE BUT CAN'T REMEMBER WHEN THEY GOT IT LAST. PLEASE CALL AND ADVISE

## 2023-10-11 ENCOUNTER — TELEPHONE (OUTPATIENT)
Dept: INTERNAL MEDICINE | Facility: CLINIC | Age: 83
End: 2023-10-11
Payer: MEDICARE

## 2023-11-06 DIAGNOSIS — M54.50 CHRONIC RIGHT-SIDED LOW BACK PAIN WITHOUT SCIATICA: ICD-10-CM

## 2023-11-06 DIAGNOSIS — G89.29 CHRONIC RIGHT-SIDED LOW BACK PAIN WITHOUT SCIATICA: ICD-10-CM

## 2023-11-06 RX ORDER — GABAPENTIN 800 MG/1
800 TABLET ORAL DAILY
Qty: 90 TABLET | OUTPATIENT
Start: 2023-11-06

## 2023-11-21 DIAGNOSIS — E03.9 ACQUIRED HYPOTHYROIDISM: ICD-10-CM

## 2023-11-21 DIAGNOSIS — F41.9 ANXIETY: ICD-10-CM

## 2023-11-21 DIAGNOSIS — M81.0 OSTEOPOROSIS, UNSPECIFIED OSTEOPOROSIS TYPE, UNSPECIFIED PATHOLOGICAL FRACTURE PRESENCE: ICD-10-CM

## 2023-11-21 DIAGNOSIS — I10 ESSENTIAL HYPERTENSION: ICD-10-CM

## 2023-11-22 RX ORDER — FEXOFENADINE HCL 180 MG/1
180 TABLET ORAL DAILY
Qty: 90 TABLET | Refills: 1 | Status: SHIPPED | OUTPATIENT
Start: 2023-11-22

## 2023-11-22 RX ORDER — ESCITALOPRAM OXALATE 20 MG/1
TABLET ORAL
Qty: 90 TABLET | Refills: 1 | Status: SHIPPED | OUTPATIENT
Start: 2023-11-22

## 2023-11-22 RX ORDER — VERAPAMIL HYDROCHLORIDE 240 MG/1
240 TABLET, FILM COATED, EXTENDED RELEASE ORAL DAILY
Qty: 90 TABLET | Refills: 1 | Status: SHIPPED | OUTPATIENT
Start: 2023-11-22

## 2023-11-22 RX ORDER — RALOXIFENE HYDROCHLORIDE 60 MG/1
60 TABLET, FILM COATED ORAL DAILY
Qty: 90 TABLET | Refills: 1 | Status: SHIPPED | OUTPATIENT
Start: 2023-11-22

## 2023-11-22 RX ORDER — VALSARTAN 160 MG/1
160 TABLET ORAL
Qty: 90 TABLET | Refills: 1 | Status: SHIPPED | OUTPATIENT
Start: 2023-11-22

## 2023-11-22 RX ORDER — LEVOTHYROXINE SODIUM 0.1 MG/1
TABLET ORAL
Qty: 90 TABLET | Refills: 1 | Status: SHIPPED | OUTPATIENT
Start: 2023-11-22

## 2023-11-28 DIAGNOSIS — E03.9 ACQUIRED HYPOTHYROIDISM: Chronic | ICD-10-CM

## 2023-11-28 RX ORDER — LEVOTHYROXINE SODIUM 88 UG/1
88 TABLET ORAL 2 TIMES WEEKLY
Qty: 24 TABLET | Refills: 0 | Status: SHIPPED | OUTPATIENT
Start: 2023-11-30

## 2023-11-29 DIAGNOSIS — E03.9 ACQUIRED HYPOTHYROIDISM: ICD-10-CM

## 2023-11-29 RX ORDER — LEVOTHYROXINE SODIUM 0.1 MG/1
TABLET ORAL
Qty: 90 TABLET | Refills: 1 | Status: SHIPPED | OUTPATIENT
Start: 2023-11-29

## 2023-12-26 ENCOUNTER — TELEPHONE (OUTPATIENT)
Dept: INTERNAL MEDICINE | Facility: CLINIC | Age: 83
End: 2023-12-26
Payer: MEDICARE

## 2023-12-26 NOTE — TELEPHONE ENCOUNTER
Patient was recently hospitalized for a UTI. She is still experiencing symptoms and exhaustion. Would like something called in to the pharmacy.

## 2024-01-09 ENCOUNTER — TELEPHONE (OUTPATIENT)
Dept: INTERNAL MEDICINE | Facility: CLINIC | Age: 84
End: 2024-01-09
Payer: MEDICARE

## 2024-01-09 NOTE — TELEPHONE ENCOUNTER
JIN LEDESMA CALLED FOR VERBAL ORDERS PHYSICAL THERAPY FOR FALL PREVENTION    PLEASE CALL [phone]

## 2024-01-12 ENCOUNTER — TELEPHONE (OUTPATIENT)
Dept: INTERNAL MEDICINE | Facility: CLINIC | Age: 84
End: 2024-01-12
Payer: MEDICARE

## 2024-01-12 NOTE — TELEPHONE ENCOUNTER
Caller: NICOLE DEXTER [employer]    Relationship:     Best call back number: [phone]    What orders are you requesting (i.e. lab or imaging): PHYSICAL THERAPY TWICE A WEEK FOR 3 WEEKS, ONCE PER WEEK FOR 2 WEEKS

## 2024-01-19 ENCOUNTER — TELEPHONE (OUTPATIENT)
Dept: INTERNAL MEDICINE | Facility: CLINIC | Age: 84
End: 2024-01-19
Payer: MEDICARE

## 2024-01-19 NOTE — TELEPHONE ENCOUNTER
PT wants twice a week for 3 weeks one a week for 3 weeks and she wants to a  nursing eval due to medication being a mess (VO given)     She Went to see pt yesterday she has increased pain in wrist its has redness on the outside it looks like gout to her please advise on what to do she sees you next week

## 2024-01-22 NOTE — TELEPHONE ENCOUNTER
Attempted to call patient, LVM advising call back    HUB TO READ  Susanne states: She may apply heat to wrist and take Tylenol for her wrist pain. She may also apply Voltaren gel up to four times a day as needed. We will evaluate at her appointment this week, thanks.

## 2024-01-22 NOTE — TELEPHONE ENCOUNTER
Okay to authorize therapy as requested. She may apply heat to wrist and take Tylenol for her wrist pain. She may also apply Voltaren gel up to four times a day as needed. We will evaluate at her appointment this week, thanks.

## 2024-01-25 ENCOUNTER — OFFICE VISIT (OUTPATIENT)
Dept: INTERNAL MEDICINE | Facility: CLINIC | Age: 84
End: 2024-01-25
Payer: MEDICARE

## 2024-01-25 VITALS
HEART RATE: 84 BPM | OXYGEN SATURATION: 92 % | SYSTOLIC BLOOD PRESSURE: 136 MMHG | BODY MASS INDEX: 39.58 KG/M2 | DIASTOLIC BLOOD PRESSURE: 86 MMHG | HEIGHT: 60 IN | WEIGHT: 201.6 LBS

## 2024-01-25 DIAGNOSIS — I10 PRIMARY HYPERTENSION: Primary | Chronic | ICD-10-CM

## 2024-01-25 DIAGNOSIS — Z23 NEED FOR INFLUENZA VACCINATION: ICD-10-CM

## 2024-01-25 DIAGNOSIS — R11.2 NAUSEA AND VOMITING, UNSPECIFIED VOMITING TYPE: ICD-10-CM

## 2024-01-25 DIAGNOSIS — J30.89 NON-SEASONAL ALLERGIC RHINITIS, UNSPECIFIED TRIGGER: Chronic | ICD-10-CM

## 2024-01-25 DIAGNOSIS — F39 MOOD DISORDER: ICD-10-CM

## 2024-01-25 DIAGNOSIS — E55.9 VITAMIN D DEFICIENCY: Chronic | ICD-10-CM

## 2024-01-25 DIAGNOSIS — E03.9 ACQUIRED HYPOTHYROIDISM: Chronic | ICD-10-CM

## 2024-01-25 DIAGNOSIS — M25.532 LEFT WRIST PAIN: ICD-10-CM

## 2024-01-25 DIAGNOSIS — L30.4 INTERTRIGO: ICD-10-CM

## 2024-01-25 LAB
25(OH)D3+25(OH)D2 SERPL-MCNC: 21.8 NG/ML (ref 30–100)
ALBUMIN SERPL-MCNC: 4.4 G/DL (ref 3.5–5.2)
ALBUMIN/GLOB SERPL: 1.7 G/DL
ALP SERPL-CCNC: 64 U/L (ref 39–117)
ALT SERPL-CCNC: 14 U/L (ref 1–33)
AST SERPL-CCNC: 13 U/L (ref 1–32)
BILIRUB SERPL-MCNC: 0.3 MG/DL (ref 0–1.2)
BUN SERPL-MCNC: 25 MG/DL (ref 8–23)
BUN/CREAT SERPL: 24.3 (ref 7–25)
CALCIUM SERPL-MCNC: 10.5 MG/DL (ref 8.6–10.5)
CHLORIDE SERPL-SCNC: 107 MMOL/L (ref 98–107)
CHOLEST SERPL-MCNC: 201 MG/DL (ref 0–200)
CO2 SERPL-SCNC: 23.4 MMOL/L (ref 22–29)
CREAT SERPL-MCNC: 1.03 MG/DL (ref 0.57–1)
EGFRCR SERPLBLD CKD-EPI 2021: 53.4 ML/MIN/1.73
ERYTHROCYTE [DISTWIDTH] IN BLOOD BY AUTOMATED COUNT: 13.6 % (ref 12.3–15.4)
GLOBULIN SER CALC-MCNC: 2.6 GM/DL
GLUCOSE SERPL-MCNC: 99 MG/DL (ref 65–99)
HCT VFR BLD AUTO: 44.5 % (ref 34–46.6)
HDLC SERPL-MCNC: 47 MG/DL (ref 40–60)
HGB BLD-MCNC: 14.2 G/DL (ref 12–15.9)
LDLC SERPL CALC-MCNC: 134 MG/DL (ref 0–100)
MCH RBC QN AUTO: 28.9 PG (ref 26.6–33)
MCHC RBC AUTO-ENTMCNC: 31.9 G/DL (ref 31.5–35.7)
MCV RBC AUTO: 90.4 FL (ref 79–97)
PLATELET # BLD AUTO: 245 10*3/MM3 (ref 140–450)
POTASSIUM SERPL-SCNC: 4.6 MMOL/L (ref 3.5–5.2)
PROT SERPL-MCNC: 7 G/DL (ref 6–8.5)
RBC # BLD AUTO: 4.92 10*6/MM3 (ref 3.77–5.28)
SODIUM SERPL-SCNC: 141 MMOL/L (ref 136–145)
TRIGL SERPL-MCNC: 111 MG/DL (ref 0–150)
TSH SERPL DL<=0.005 MIU/L-ACNC: 4.24 UIU/ML (ref 0.27–4.2)
URATE SERPL-MCNC: 6.3 MG/DL (ref 2.4–5.7)
VLDLC SERPL CALC-MCNC: 20 MG/DL (ref 5–40)
WBC # BLD AUTO: 8.91 10*3/MM3 (ref 3.4–10.8)

## 2024-01-25 PROCEDURE — 1160F RVW MEDS BY RX/DR IN RCRD: CPT | Performed by: NURSE PRACTITIONER

## 2024-01-25 PROCEDURE — G0008 ADMIN INFLUENZA VIRUS VAC: HCPCS | Performed by: NURSE PRACTITIONER

## 2024-01-25 PROCEDURE — 3079F DIAST BP 80-89 MM HG: CPT | Performed by: NURSE PRACTITIONER

## 2024-01-25 PROCEDURE — 1159F MED LIST DOCD IN RCRD: CPT | Performed by: NURSE PRACTITIONER

## 2024-01-25 PROCEDURE — 99214 OFFICE O/P EST MOD 30 MIN: CPT | Performed by: NURSE PRACTITIONER

## 2024-01-25 PROCEDURE — 3075F SYST BP GE 130 - 139MM HG: CPT | Performed by: NURSE PRACTITIONER

## 2024-01-25 PROCEDURE — 90662 IIV NO PRSV INCREASED AG IM: CPT | Performed by: NURSE PRACTITIONER

## 2024-01-25 RX ORDER — AMOXICILLIN 250 MG
2 CAPSULE ORAL 2 TIMES DAILY
Status: CANCELLED
Start: 2024-01-25

## 2024-01-25 RX ORDER — ALBUTEROL SULFATE 90 UG/1
2 AEROSOL, METERED RESPIRATORY (INHALATION) EVERY 4 HOURS PRN
Qty: 18 G | Refills: 6 | Status: SHIPPED | OUTPATIENT
Start: 2024-01-25

## 2024-01-25 RX ORDER — VALSARTAN AND HYDROCHLOROTHIAZIDE 160; 25 MG/1; MG/1
TABLET ORAL
COMMUNITY
Start: 2023-10-05

## 2024-01-25 RX ORDER — NYSTATIN 100000 U/G
1 CREAM TOPICAL EVERY 12 HOURS SCHEDULED
Qty: 30 G | Refills: 0 | Status: SHIPPED | OUTPATIENT
Start: 2024-01-25

## 2024-01-25 RX ORDER — ONDANSETRON 8 MG/1
8 TABLET, ORALLY DISINTEGRATING ORAL EVERY 12 HOURS PRN
Qty: 30 TABLET | Refills: 0 | Status: SHIPPED | OUTPATIENT
Start: 2024-01-25

## 2024-01-25 RX ORDER — BISACODYL 10 MG
10 SUPPOSITORY, RECTAL RECTAL DAILY PRN
Status: CANCELLED
Start: 2024-01-25

## 2024-01-25 NOTE — PROGRESS NOTES
"  Division of Infectious Diseases - Progress Note    Patient Name: Harvey Lucero Jr. MR#: [id number] : 1964 Admitted 2023  Date: 23 Time: 10:10 AM Author: Andriy Fonseca MD    Subjective: feels fine    Physical Examination:  Vital signs reviewed  Temp (24hrs), Av.6 °C (97.9 °F), Min:36.4 °C (97.6 °F), Max:37 °C (98.6 °F)    Visit Vitals  BP (!) 145/65 (BP Location: Right upper arm, Patient Position: Lying)   Pulse 83   Temp 36.4 °C (97.6 °F) (Oral)   Resp 18   Ht 1.88 m (6' 2\")   Wt 99.3 kg (219 lb)   SpO2 98%   BMI 28.12 kg/m²     General: no respiratory distress   Neurologic: alert  Oropharynx: moist mucous membranes  Neck: trachea midline  Heart: regular rate, regular rhythm, normal S1, normal S2  Lungs: clear to auscultation bilaterally  Abdomen: soft, normal bowel sounds, no distension, no mass, no tenderness  Musculoskeletal: left foot dressed  Skin: no rash    Recent Results (from the past 24 hour(s))   POCT Glucose    Collection Time: 23 12:25 PM   Result Value Ref Range    POCT Bedside Glucose 121 (H) 70 - 99 mg/dL    POC Test POC    POCT Glucose    Collection Time: 23  5:20 PM   Result Value Ref Range    POCT Bedside Glucose 102 (H) 70 - 99 mg/dL    POC Test POC    POCT Glucose    Collection Time: 23  9:25 PM   Result Value Ref Range    POCT Bedside Glucose 110 (H) 70 - 99 mg/dL    POC Test POC    CBC    Collection Time: 23  4:57 AM   Result Value Ref Range    WBC 8.77 3.80 - 10.50 K/uL    RBC 3.59 (L) 4.50 - 5.80 M/uL    Hemoglobin 8.9 (L) 13.7 - 17.5 g/dL    Hematocrit 28.5 (L) 40.1 - 51.0 %    MCV 79.4 (L) 83.0 - 98.0 fL    MCH 24.8 (L) 28.0 - 33.2 pg    MCHC 31.2 (L) 32.2 - 36.5 g/dL    RDW 16.6 (H) 11.6 - 14.4 %    Platelets 292 150 - 350 K/uL    MPV 10.7 9.4 - 12.4 fL   Basic metabolic panel    Collection Time: 23  4:57 AM   Result Value Ref Range    Sodium 138 136 - 145 mEQ/L    Potassium 3.8 3.5 - 5.1 mEQ/L    Chloride 105 98 - 107 " "Chief Complaint  Wrist Pain, Hypothyroidism, and Hypertension  Subjective        Dee Mac presents to [facility] for f/u regarding hypothyroidism, left wrist pain and HTN.    History of Present Illness    She c/o left wrist pain with intermittent swelling, pain began several days ago without an acute injury or trauma. She has applied heat and taken Tylenol with mild relief, denies paresthesias. No hx of gout.  She was sleeping in a chair and wonders if she hit left hand on the table. She uses [employer] which helps.    Her blood pressure went up to 166 systolic last week when she was in pain. She has intermittent episodes of feeling lightheaded which is present today.     She is getting therapy from [facility]. They are working on her walking, standing, and balance. She fell a few weeks ago. She could not find her phone and it rang. She got up and laid down to get her phone and the chair started dropping. She bruised her toes trying to get herself up in the chair. EMS was called and they got her up without any acute injuries.     Supplemental Information  She uses Zofran for nausea which is intermittent, denies abdominal pain. She uses nystatin cream as needed in skin folds.     Objective   Vital Signs:  /86 (BP Location: Left arm, Patient Position: Sitting, Cuff Size: Large Adult)   Pulse 84   Ht 152.4 cm (60\")   Wt 91.4 kg (201 lb 9.6 oz)   SpO2 92%   BMI 39.37 kg/m²   Estimated body mass index is 39.37 kg/m² as calculated from the following:    Height as of this encounter: 152.4 cm (60\").    Weight as of this encounter: 91.4 kg (201 lb 9.6 oz).            Physical Exam  Constitutional:       Appearance: She is well-developed. She is not ill-appearing.   HENT:      Head: Normocephalic.      Right Ear: Hearing, tympanic membrane and external ear normal.      Left Ear: Hearing, tympanic membrane and external ear normal.      Nose: Nose normal. No nasal deformity, mucosal " edema or rhinorrhea.      Right Sinus: No maxillary sinus tenderness or frontal sinus tenderness.      Left Sinus: No maxillary sinus tenderness or frontal sinus tenderness.      Mouth/Throat:      Dentition: Normal dentition.   Eyes:      General: Lids are normal.         Right eye: No discharge.         Left eye: No discharge.      Conjunctiva/sclera: Conjunctivae normal.      Right eye: No exudate.     Left eye: No exudate.  Neck:      Thyroid: No thyroid mass or thyromegaly.      Vascular: No carotid bruit.      Trachea: Trachea normal.   Cardiovascular:      Rate and Rhythm: Regular rhythm.      Pulses: Normal pulses.      Heart sounds: Normal heart sounds. No murmur heard.  Pulmonary:      Effort: No respiratory distress.      Breath sounds: Normal breath sounds. No decreased breath sounds, wheezing, rhonchi or rales.   Abdominal:      General: Bowel sounds are normal.      Palpations: Abdomen is soft.      Tenderness: There is no abdominal tenderness.   Musculoskeletal:      Left wrist: Tenderness present. No swelling (no erythema or acute swelling).      Cervical back: Normal range of motion. No edema.      Comments: Pt is using a rollator for assistance   Lymphadenopathy:      Head:      Right side of head: No submental, submandibular, tonsillar, preauricular, posterior auricular or occipital adenopathy.      Left side of head: No submental, submandibular, tonsillar, preauricular, posterior auricular or occipital adenopathy.   Skin:     General: Skin is warm and dry.      Nails: There is no clubbing.   Neurological:      Mental Status: She is alert.   Psychiatric:         Behavior: Behavior is cooperative.        Result Review :  The following data was reviewed by: MARTA Brink on 01/25/2024:  Common labs          8/24/2023    03:07 9/22/2023    00:00 1/25/2024    11:27   Common Labs   Glucose  108  99    BUN  20  25    Creatinine  1.07  1.03    Sodium  143  141    Potassium  4.9  4.6    Chloride   mEQ/L    CO2 23 21 - 31 mEQ/L    BUN 24 7 - 25 mg/dL    Creatinine 3.8 (H) 0.7 - 1.3 mg/dL    Glucose 80 70 - 99 mg/dL    Calcium 8.3 (L) 8.6 - 10.3 mg/dL    eGFR 16.3 (L) >=60.0 mL/min/1.73m*2    Anion Gap 10 3 - 15 mEQ/L   POCT Glucose    Collection Time: 09/25/23  7:30 AM   Result Value Ref Range    POCT Bedside Glucose 82 70 - 99 mg/dL    POC Test POC      Microbiology Results     Procedure Component Value Units Date/Time    Anaerobic Culture / Smear (includes Aerobic Culture) Foot, Left [[id number]]  (Abnormal)  (Susceptibility) Collected: 09/21/23 1406    Specimen: Bone from Foot, Left Updated: 09/25/23 0807     Culture **Positive Culture**      1+ Proteus vulgaris      1+ Streptococcus constellatus      1+ Enterococcus faecalis     Gram Stain Result No WBC Seen      No organisms seen    Anaerobic Culture / Smear (includes Aerobic Culture) Foot, Left [[id number]]  (Abnormal)  (Susceptibility) Collected: 09/19/23 2118    Specimen: Wound Swab from Foot, Left Updated: 09/24/23 1601     Culture **Positive Culture**      1+ Proteus vulgaris      1+ Enterococcus faecalis      2+ Streptococcus constellatus      4+ Mixed Anaerobic Growth     Gram Stain Result 3+ WBC      4+ Gram positive cocci in pairs      2+ Gram negative bacilli    Blood Culture Blood, Venous [[id number]]  (Normal) Collected: 09/19/23 2107    Specimen: Blood, Venous Updated: 09/25/23 0200     Culture No growth at 120 hours        Anti-infectives (From admission, onward)    Start     Dose/Rate Route Frequency Ordered Stop    09/22/23 1215  ampicillin-sulbactam (UNASYN) IVPB 3 g/100 mL NSS         3 g  200 mL/hr over 30 Minutes intravenous Every 8 hours interval 09/22/23 1118            Assessment:   1. Left foot osteomyelitis. Culture of bone (+) Proteus vulgaris, Streptococcus constellatus, Enterococcus faecalis. Swab culture also isolated 4+ anaerobes.  2. Acute kidney injury. Case discussed with Dr Joyner today.    Plan:    discontinue  110  107    Calcium  10.9  10.5    Total Protein  7.2  7.0    Albumin  4.5  4.4    Total Bilirubin  <0.2  0.3    Alkaline Phosphatase  60  64    AST (SGOT)  14  13    ALT (SGPT)  10  14    WBC 8.73     10.10  8.91    Hemoglobin 13.3     14.1  14.2    Hematocrit 42.4     42.9  44.5    Platelets 181     245  245    Total Cholesterol   201    Triglycerides   111    HDL Cholesterol   47    LDL Cholesterol    134    Uric Acid   6.3       Details          This result is from an external source.                           Assessment and Plan   Diagnoses and all orders for this visit:    1. Primary hypertension (Primary)  Assessment & Plan:  BP is stable today (increases when pain increases), continue valsartan-HCTZ along with a low sodium diet.    Orders:  -     CBC (No Diff)  -     Comprehensive Metabolic Panel  -     Lipid Panel    2. Nausea and vomiting, unspecified vomiting type  Comments:  sx improved, continue to monitor (no acute abnl on CT abd/pelvis)  Assessment & Plan:  She notes intermittent episodes of nausea, managed with Zofran which she uses rarely-continue to monitor    Orders:  -     ondansetron ODT (ZOFRAN-ODT) 8 MG disintegrating tablet; Place 1 tablet on the tongue Every 12 (Twelve) Hours As Needed for Nausea or Vomiting.  Dispense: 30 tablet; Refill: 0    3. Vitamin D deficiency  Assessment & Plan:  Recheck level today    Orders:  -     Vitamin D,25-Hydroxy    4. Acquired hypothyroidism  Assessment & Plan:  She is currently alternating Synthroid 100 mcg with 88 mcg daily, recheck TSH.    Orders:  -     TSH    5. Left wrist pain  Comments:  Sx suggestive of OA, continue Tylenol-check uric acid  Orders:  -     Uric Acid    6. Non-seasonal allergic rhinitis, unspecified trigger  Assessment & Plan:  Managed on Allegra daily, uses Albuterol inh for breakthrough symptoms    Orders:  -     albuterol sulfate  (90 Base) MCG/ACT inhaler; Inhale 2 puffs Every 4 (Four) Hours As Needed for Wheezing.   ampicillin/sulbactam   for now, Rx linezolid + ciprofloxacin + metronidazole    Dr Dias will return tomorrow    Andriy Fonseca MD  [phone]    9/25/2023 10:10 AM     Dispense: 18 g; Refill: 6    7. Mood disorder  Assessment & Plan:  She is doing well with Lexapro daily, continue to monitor.      8. Intertrigo  Assessment & Plan:  Sx managed with Nystatin cr as needed, continue to monitor.    Orders:  -     nystatin (MYCOSTATIN) 073133 UNIT/GM cream; Apply 1 Application topically to the appropriate area as directed Every 12 (Twelve) Hours.  Dispense: 30 g; Refill: 0    9. Need for influenza vaccination  -     Fluzone High-Dose 65+yrs           Follow Up   Return in about 4 months (around 5/25/2024).  Patient was given instructions and counseling regarding her condition or for health maintenance advice. Please see specific information pulled into the AVS if appropriate.      Transcribed from ambient dictation for MARTA Brink by Rita Cavanaugh.  01/25/24   13:09 EST    Patient or patient representative verbalized consent to the visit recording.  I have personally performed the services described in this document as transcribed by the above individual, and it is both accurate and complete.

## 2024-01-28 PROBLEM — L30.4 INTERTRIGO: Chronic | Status: ACTIVE | Noted: 2024-01-28

## 2024-01-28 PROBLEM — L30.4 INTERTRIGO: Status: ACTIVE | Noted: 2024-01-28

## 2024-01-28 NOTE — ASSESSMENT & PLAN NOTE
She notes intermittent episodes of nausea, managed with Zofran which she uses rarely-continue to monitor

## 2024-01-28 NOTE — ASSESSMENT & PLAN NOTE
BP is stable today (increases when pain increases), continue valsartan-HCTZ along with a low sodium diet.

## 2024-02-15 DIAGNOSIS — E03.9 ACQUIRED HYPOTHYROIDISM: Chronic | ICD-10-CM

## 2024-02-15 RX ORDER — LEVOTHYROXINE SODIUM 88 UG/1
88 TABLET ORAL 2 TIMES WEEKLY
Qty: 90 TABLET | Refills: 1 | Status: SHIPPED | OUTPATIENT
Start: 2024-02-15

## 2024-02-16 ENCOUNTER — TELEPHONE (OUTPATIENT)
Dept: INTERNAL MEDICINE | Facility: CLINIC | Age: 84
End: 2024-02-16

## 2024-02-16 DIAGNOSIS — N39.0 ACUTE UTI: Primary | ICD-10-CM

## 2024-02-16 DIAGNOSIS — R30.0 DYSURIA: Primary | ICD-10-CM

## 2024-02-16 DIAGNOSIS — N39.0 ACUTE UTI: ICD-10-CM

## 2024-02-16 LAB
BACTERIA UR QL AUTO: ABNORMAL /HPF
BILIRUB BLD-MCNC: NEGATIVE MG/DL
CLARITY, POC: ABNORMAL
COLOR UR: YELLOW
EXPIRATION DATE: ABNORMAL
GLUCOSE UR STRIP-MCNC: NEGATIVE MG/DL
KETONES UR QL: NEGATIVE
LEUKOCYTE EST, POC: ABNORMAL
Lab: ABNORMAL
NITRITE UR-MCNC: POSITIVE MG/ML
PH UR: 6 [PH] (ref 5–8)
PROT UR STRIP-MCNC: ABNORMAL MG/DL
RBC # UR STRIP: ABNORMAL /HPF
RBC # UR STRIP: NEGATIVE /UL
SP GR UR: 1.01 (ref 1–1.03)
SQUAMOUS EPITHELIAL, POC: ABNORMAL
UROBILINOGEN UR QL: ABNORMAL
WBC # UR STRIP: ABNORMAL /HPF

## 2024-02-16 PROCEDURE — 81001 URINALYSIS AUTO W/SCOPE: CPT | Performed by: NURSE PRACTITIONER

## 2024-02-16 PROCEDURE — 81003 URINALYSIS AUTO W/O SCOPE: CPT | Performed by: NURSE PRACTITIONER

## 2024-02-16 RX ORDER — NITROFURANTOIN 25; 75 MG/1; MG/1
100 CAPSULE ORAL EVERY 12 HOURS SCHEDULED
Qty: 14 CAPSULE | Refills: 0 | Status: SHIPPED | OUTPATIENT
Start: 2024-02-16 | End: 2024-02-16 | Stop reason: SDUPTHER

## 2024-02-16 RX ORDER — NITROFURANTOIN 25; 75 MG/1; MG/1
100 CAPSULE ORAL EVERY 12 HOURS SCHEDULED
Qty: 14 CAPSULE | Refills: 0 | Status: SHIPPED | OUTPATIENT
Start: 2024-02-16 | End: 2024-02-23

## 2024-02-19 ENCOUNTER — TELEPHONE (OUTPATIENT)
Dept: INTERNAL MEDICINE | Facility: CLINIC | Age: 84
End: 2024-02-19
Payer: MEDICARE

## 2024-02-19 LAB
BACTERIA UR CULT: ABNORMAL
BACTERIA UR CULT: ABNORMAL
OTHER ANTIBIOTIC SUSC ISLT: ABNORMAL

## 2024-02-19 NOTE — TELEPHONE ENCOUNTER
Pharmacy Name:  [facility] [place] [facility] [address] - [phone] [facility] [phone]      Pharmacy representative name: FREDI    Pharmacy representative phone number: [phone]    What medication are you calling in regards to: nitrofurantoin, macrocrystal-monohydrate, (MACROBID) 100 MG capsule     What question does the pharmacy have: NEEDING THIS PRESCRIPTION RESENT TO THIS PHARMACY.     FREDI STATED THAT THIS PRESCRIPTION CAN NOT BE TRANSFERRED TO THEIR SYSTEM FROM THE OTHER PHARMACY    Who is the provider that prescribed the medication: ISAIAS VAZQUEZ     Additional notes: PLEASE CALL TO DISCUSS IF NEEDED.

## 2024-02-19 NOTE — TELEPHONE ENCOUNTER
Spoke with patient, she was able to  Rx from [facility]. No further actions needed at this time.

## 2024-03-07 ENCOUNTER — TELEPHONE (OUTPATIENT)
Dept: INTERNAL MEDICINE | Facility: CLINIC | Age: 84
End: 2024-03-07
Payer: MEDICARE

## 2024-03-07 NOTE — TELEPHONE ENCOUNTER
Caller: YANN AT [facility]    Relationship:     Best call back number: [phone]    What was the call regarding:   YANN STATES THAT PATIENT IS BEING DISCHARGED FROM [facility] SERVICES DUE TO NON COMPLIANCE, NOT FOLLOWING PLANS AND PATIENT IS ALSO NOT FOLLOWING MEDICATION DIRECTIONS.

## 2024-03-10 ENCOUNTER — READMISSION MANAGEMENT (OUTPATIENT)
Dept: CALL CENTER | Facility: HOSPITAL | Age: 84
End: 2024-03-10
Payer: MEDICARE

## 2024-03-10 NOTE — OUTREACH NOTE
Prep Survey      Flowsheet Row Responses   [facility] facility patient discharged from? Non-BH   Is LACE score < 7 ? Non-BH Discharge   Eligibility Not Eligible   What are the reasons patient is not eligible? Other  [No admit]   Does the patient have one of the following disease processes/diagnoses(primary or secondary)? Other   Prep survey completed? Yes            Caity CLARK - Registered Nurse

## 2024-04-11 DIAGNOSIS — N39.0 ACUTE UTI: ICD-10-CM

## 2024-04-12 RX ORDER — NITROFURANTOIN 25; 75 MG/1; MG/1
CAPSULE ORAL
Qty: 14 CAPSULE | Refills: 0 | OUTPATIENT
Start: 2024-04-12

## 2024-05-17 DIAGNOSIS — I10 ESSENTIAL HYPERTENSION: ICD-10-CM

## 2024-05-17 DIAGNOSIS — F41.9 ANXIETY: ICD-10-CM

## 2024-05-17 DIAGNOSIS — M81.0 OSTEOPOROSIS, UNSPECIFIED OSTEOPOROSIS TYPE, UNSPECIFIED PATHOLOGICAL FRACTURE PRESENCE: ICD-10-CM

## 2024-05-21 RX ORDER — ESCITALOPRAM OXALATE 20 MG/1
TABLET ORAL
Qty: 90 TABLET | Refills: 1 | Status: SHIPPED | OUTPATIENT
Start: 2024-05-21

## 2024-05-21 RX ORDER — FEXOFENADINE HCL 180 MG/1
180 TABLET ORAL DAILY
Qty: 90 TABLET | Refills: 1 | Status: SHIPPED | OUTPATIENT
Start: 2024-05-21

## 2024-05-21 RX ORDER — RALOXIFENE HYDROCHLORIDE 60 MG/1
60 TABLET, FILM COATED ORAL DAILY
Qty: 90 TABLET | Refills: 1 | Status: SHIPPED | OUTPATIENT
Start: 2024-05-21

## 2024-05-21 RX ORDER — VERAPAMIL HYDROCHLORIDE 240 MG/1
240 TABLET, FILM COATED, EXTENDED RELEASE ORAL DAILY
Qty: 90 TABLET | Refills: 1 | Status: SHIPPED | OUTPATIENT
Start: 2024-05-21

## 2024-08-02 ENCOUNTER — TELEPHONE (OUTPATIENT)
Dept: INTERNAL MEDICINE | Facility: CLINIC | Age: 84
End: 2024-08-02

## 2024-08-02 ENCOUNTER — READMISSION MANAGEMENT (OUTPATIENT)
Dept: CALL CENTER | Facility: HOSPITAL | Age: 84
End: 2024-08-02
Payer: MEDICARE

## 2024-08-02 ENCOUNTER — TELEPHONE (OUTPATIENT)
Dept: INTERNAL MEDICINE | Facility: CLINIC | Age: 84
End: 2024-08-02
Payer: MEDICARE

## 2024-08-02 NOTE — TELEPHONE ENCOUNTER
Hub staff attempted to follow warm transfer process and was unsuccessful     Caller: Myron Watts    Relationship to patient: Emergency Contact    Best call back number: [phone]    Patient is needing: PATIENT WAS DISCHARGED FROM THE [facility] TODAY.  PATIENT'S SON IS CALLING TO SCHEDULE A HOSPITAL FOLLOW UP.    UNABLE TO WARM TRANSFER.    PLEASE ADVISE.

## 2024-08-02 NOTE — TELEPHONE ENCOUNTER
Attempted to call patient to set up follow up appt, PAO requesting call back    Susanne - armin to give verbal orders? Last OV 01/2024

## 2024-08-02 NOTE — TELEPHONE ENCOUNTER
Caller: JIN    Relationship: Home Health    Best call back number: [phone]     What orders are you requesting (i.e. lab or imaging): VERBAL ORDERS    Additional notes: SKILLED NURSING & PHYSICAL THERAPY EVALUATION.

## 2024-08-03 NOTE — OUTREACH NOTE
Prep Survey      Flowsheet Row Responses   [facility] facility patient discharged from? Non-BH   Is LACE score < 7 ? Non-BH Discharge   Eligibility [facility]   Date of Discharge 08/02/24   Discharge Disposition Home or Self Care   Discharge diagnosis Metabolic encephalopathy   Does the patient have one of the following disease processes/diagnoses(primary or secondary)? Other   Does the patient have Home health ordered? No   Is there a DME ordered? No   Prep survey completed? Yes            RACHELL HERNANDEZ - Registered Nurse

## 2024-08-05 ENCOUNTER — TRANSITIONAL CARE MANAGEMENT TELEPHONE ENCOUNTER (OUTPATIENT)
Dept: CALL CENTER | Facility: HOSPITAL | Age: 84
End: 2024-08-05
Payer: MEDICARE

## 2024-08-05 NOTE — OUTREACH NOTE
Call Center TCM Note      Flowsheet Row Responses   [facility] patient discharged from? Non-  [[facility]]   Does the patient have one of the following disease processes/diagnoses(primary or secondary)? Other   TCM attempt successful? No   Unsuccessful attempts Attempt 2            Nena Yang RN    8/5/2024, 16:03 EDT

## 2024-08-05 NOTE — OUTREACH NOTE
Call Center TCM Note      Flowsheet Row Responses   [facility] patient discharged from? Non-   Does the patient have one of the following disease processes/diagnoses(primary or secondary)? Other   TCM attempt successful? No   Unsuccessful attempts Attempt 1  [No one listed on BH PCP verbal release.]            Nena Yang RN    8/5/2024, 09:39 EDT

## 2024-08-06 ENCOUNTER — TRANSITIONAL CARE MANAGEMENT TELEPHONE ENCOUNTER (OUTPATIENT)
Dept: CALL CENTER | Facility: HOSPITAL | Age: 84
End: 2024-08-06
Payer: MEDICARE

## 2024-08-06 NOTE — OUTREACH NOTE
Call Center TCM Note      Flowsheet Row Responses   [facility] patient discharged from? Non-  [[facility]]   Does the patient have one of the following disease processes/diagnoses(primary or secondary)? Other   TCM attempt successful? No   Unsuccessful attempts Attempt 3   Wrap up additional comments D/C DX: Metabolic encephalopathy - Unable to reach pt x 3 attempts for TCM call. Pt is scheduled for TCM APPT with PCP MARTA Sharma on 08/09/2024.            Lis Sutherland MA    8/6/2024, 13:25 EDT

## 2024-08-08 ENCOUNTER — TELEPHONE (OUTPATIENT)
Dept: INTERNAL MEDICINE | Facility: CLINIC | Age: 84
End: 2024-08-08
Payer: MEDICARE

## 2024-08-08 NOTE — TELEPHONE ENCOUNTER
Caller: NICOLE    Relationship: Home Health    Best call back number: [phone]     What orders are you requesting (i.e. lab or imaging): VERBAL ORDERS     In what timeframe would the patient need to come in: ASAP    Where will you receive your lab/imaging services: PATIENT HOME    Additional notes: NICOLE IS REQUESTING VERBAL ORDERS FOR CONTINUED PHYSICAL THERAPY WITH THE PATIENT AT A     2 TIMES A WEEK FOR 3 WEEKS  1 TIME A WEEK FOR 2 WEEK    PATIENT FELL ON 08/07/24 EMS WAS CALLED BUT PATIENT HAD NO INJURY AND WAS NOT TRANSPORTED TO HOSPITAL.     PLEASE CALL TO ADVISE.

## 2024-08-09 ENCOUNTER — OFFICE VISIT (OUTPATIENT)
Dept: INTERNAL MEDICINE | Facility: CLINIC | Age: 84
End: 2024-08-09
Payer: MEDICARE

## 2024-08-09 VITALS
OXYGEN SATURATION: 96 % | SYSTOLIC BLOOD PRESSURE: 132 MMHG | TEMPERATURE: 98 F | HEIGHT: 60 IN | HEART RATE: 96 BPM | DIASTOLIC BLOOD PRESSURE: 76 MMHG | WEIGHT: 194.2 LBS | BODY MASS INDEX: 38.13 KG/M2

## 2024-08-09 DIAGNOSIS — E03.9 ACQUIRED HYPOTHYROIDISM: Chronic | ICD-10-CM

## 2024-08-09 DIAGNOSIS — I10 PRIMARY HYPERTENSION: Primary | Chronic | ICD-10-CM

## 2024-08-09 DIAGNOSIS — R29.6 FREQUENT FALLS: ICD-10-CM

## 2024-08-09 DIAGNOSIS — N39.46 MIXED STRESS AND URGE URINARY INCONTINENCE: Chronic | ICD-10-CM

## 2024-08-09 DIAGNOSIS — N18.31 STAGE 3A CHRONIC KIDNEY DISEASE: ICD-10-CM

## 2024-08-09 DIAGNOSIS — R41.3 MEMORY LOSS: ICD-10-CM

## 2024-08-09 DIAGNOSIS — F51.01 PRIMARY INSOMNIA: Chronic | ICD-10-CM

## 2024-08-09 PROCEDURE — 1160F RVW MEDS BY RX/DR IN RCRD: CPT | Performed by: NURSE PRACTITIONER

## 2024-08-09 PROCEDURE — 1125F AMNT PAIN NOTED PAIN PRSNT: CPT | Performed by: NURSE PRACTITIONER

## 2024-08-09 PROCEDURE — G2211 COMPLEX E/M VISIT ADD ON: HCPCS | Performed by: NURSE PRACTITIONER

## 2024-08-09 PROCEDURE — 99214 OFFICE O/P EST MOD 30 MIN: CPT | Performed by: NURSE PRACTITIONER

## 2024-08-09 PROCEDURE — 1159F MED LIST DOCD IN RCRD: CPT | Performed by: NURSE PRACTITIONER

## 2024-08-09 RX ORDER — VALSARTAN 160 MG/1
TABLET ORAL
COMMUNITY
Start: 2024-08-03

## 2024-08-09 RX ORDER — PANTOPRAZOLE SODIUM 40 MG/1
40 TABLET, DELAYED RELEASE ORAL 2 TIMES DAILY
COMMUNITY

## 2024-08-09 RX ORDER — SENNOSIDES A AND B 8.6 MG/1
1 TABLET, FILM COATED ORAL DAILY
COMMUNITY
Start: 2024-08-03

## 2024-08-09 RX ORDER — UBIDECARENONE 75 MG
100 CAPSULE ORAL DAILY
COMMUNITY
Start: 2024-07-03 | End: 2024-10-01

## 2024-08-10 LAB
ALBUMIN SERPL-MCNC: 3.9 G/DL (ref 3.5–5.2)
ALBUMIN/GLOB SERPL: 1.4 G/DL
ALP SERPL-CCNC: 68 U/L (ref 39–117)
ALT SERPL-CCNC: 12 U/L (ref 1–33)
AST SERPL-CCNC: 13 U/L (ref 1–32)
BILIRUB SERPL-MCNC: 0.2 MG/DL (ref 0–1.2)
BUN SERPL-MCNC: 19 MG/DL (ref 8–23)
BUN/CREAT SERPL: 19.6 (ref 7–25)
CALCIUM SERPL-MCNC: 10.5 MG/DL (ref 8.6–10.5)
CHLORIDE SERPL-SCNC: 106 MMOL/L (ref 98–107)
CO2 SERPL-SCNC: 23.2 MMOL/L (ref 22–29)
CREAT SERPL-MCNC: 0.97 MG/DL (ref 0.57–1)
EGFRCR SERPLBLD CKD-EPI 2021: 57 ML/MIN/1.73
ERYTHROCYTE [DISTWIDTH] IN BLOOD BY AUTOMATED COUNT: 13 % (ref 12.3–15.4)
GLOBULIN SER CALC-MCNC: 2.8 GM/DL
GLUCOSE SERPL-MCNC: 103 MG/DL (ref 65–99)
HCT VFR BLD AUTO: 42.8 % (ref 34–46.6)
HGB BLD-MCNC: 13.9 G/DL (ref 12–15.9)
MCH RBC QN AUTO: 29.5 PG (ref 26.6–33)
MCHC RBC AUTO-ENTMCNC: 32.5 G/DL (ref 31.5–35.7)
MCV RBC AUTO: 90.9 FL (ref 79–97)
PLATELET # BLD AUTO: 267 10*3/MM3 (ref 140–450)
POTASSIUM SERPL-SCNC: 4.5 MMOL/L (ref 3.5–5.2)
PROT SERPL-MCNC: 6.7 G/DL (ref 6–8.5)
RBC # BLD AUTO: 4.71 10*6/MM3 (ref 3.77–5.28)
SODIUM SERPL-SCNC: 139 MMOL/L (ref 136–145)
TSH SERPL DL<=0.005 MIU/L-ACNC: 1.98 UIU/ML (ref 0.27–4.2)
WBC # BLD AUTO: 10.88 10*3/MM3 (ref 3.4–10.8)

## 2024-08-13 ENCOUNTER — TELEPHONE (OUTPATIENT)
Dept: INTERNAL MEDICINE | Facility: CLINIC | Age: 84
End: 2024-08-13
Payer: MEDICARE

## 2024-08-13 NOTE — TELEPHONE ENCOUNTER
Guy with [facility] called reporting increased confusion and requested verbal order for UA.     Verbal order given per Susanne

## 2024-08-18 ENCOUNTER — DOCUMENTATION (OUTPATIENT)
Dept: INTERNAL MEDICINE | Facility: CLINIC | Age: 84
End: 2024-08-18
Payer: MEDICARE

## 2024-08-18 DIAGNOSIS — N39.0 ACUTE UTI: Primary | ICD-10-CM

## 2024-08-18 RX ORDER — NITROFURANTOIN 25; 75 MG/1; MG/1
100 CAPSULE ORAL EVERY 12 HOURS SCHEDULED
Qty: 14 CAPSULE | Refills: 0 | Status: SHIPPED | OUTPATIENT
Start: 2024-08-18 | End: 2024-08-18 | Stop reason: SDUPTHER

## 2024-08-18 RX ORDER — NITROFURANTOIN 25; 75 MG/1; MG/1
100 CAPSULE ORAL EVERY 12 HOURS SCHEDULED
Qty: 14 CAPSULE | Refills: 0 | Status: SHIPPED | OUTPATIENT
Start: 2024-08-18 | End: 2024-08-25

## 2024-08-18 NOTE — PROGRESS NOTES
"           Name: Dee Watts  :  1940    Call Complaint/Concern:          Dee Watts is a 84 y.o. female patient of Susanne Sharma, APRBASILIO who has called for:     Son, POA - called because abx was sent to mail order.   Requested to be sent to [facility].     Abx for macrobid resent.     WCN    Urine culture:     Patient instructed to follow up with PCP if they have further issues or concerns.  If problem worsens over the night or weekend, they should seek urgent care. Kleb pneumoniae     Chidi \"Que\" Chi, APRN   24    Objective:          Current Outpatient Medications:     acetaminophen (TYLENOL) 325 MG tablet, Take 2 tablets by mouth Every 4 (Four) Hours As Needed for Mild Pain. (Patient not taking: Reported on 2024), Disp: , Rfl:     Cranberry-Vitamin C-Inulin (UTI-STAT PO), Take  by mouth., Disp: , Rfl:     escitalopram (LEXAPRO) 20 MG tablet, TAKE 1 TABLET BY MOUTH ONCE DAILY, Disp: 90 tablet, Rfl: 1    fexofenadine (ALLEGRA) 180 MG tablet, TAKE 1 TABLET BY MOUTH ONCE DAILY, Disp: 90 tablet, Rfl: 1    Lactobacillus (FLORANEX PO), Take  by mouth., Disp: , Rfl:     levothyroxine (SYNTHROID, LEVOTHROID) 100 MCG tablet, Take 1 tablet daily Monday through Friday, Disp: 90 tablet, Rfl: 1    levothyroxine (SYNTHROID, LEVOTHROID) 88 MCG tablet, TAKE 1 TABLET BY MOUTH 2 (TWO) TIMES A WEEK. SATURDAY &  (Patient not taking: Reported on 2024), Disp: 90 tablet, Rfl: 1    nitrofurantoin, macrocrystal-monohydrate, (MACROBID) 100 MG capsule, Take 1 capsule by mouth Every 12 (Twelve) Hours for 7 days., Disp: 14 capsule, Rfl: 0    ondansetron ODT (ZOFRAN-ODT) 8 MG disintegrating tablet, Place 1 tablet on the tongue Every 12 (Twelve) Hours As Needed for Nausea or Vomiting., Disp: 30 tablet, Rfl: 0    pantoprazole (PROTONIX) 40 MG EC tablet, Take 1 tablet by mouth 2 (Two) Times a Day., Disp: , Rfl:     raloxifene (EVISTA) 60 MG tablet, TAKE 1 TABLET BY MOUTH ONCE DAILY, Disp: 90 tablet, " Rfl: 1    senna (SENOKOT) 8.6 MG tablet, Take 1 tablet by mouth Daily., Disp: , Rfl:     valsartan (DIOVAN) 160 MG tablet, , Disp: , Rfl:     valsartan-hydrochlorothiazide (DIOVAN-HCT) 160-25 MG per tablet, , Disp: , Rfl:     verapamil SR (CALAN-SR) 240 MG CR tablet, TAKE 1 TABLET BY MOUTH ONCE DAILY, Disp: 90 tablet, Rfl: 1    vitamin B-12 (cyanocobalamin) 100 MCG tablet, Take 1 tablet by mouth Daily., Disp: , Rfl:     Office Visit on 08/09/2024   Component Date Value Ref Range Status    WBC 08/09/2024 10.88 (H)  3.40 - 10.80 10*3/mm3 Final    RBC 08/09/2024 4.71  3.77 - 5.28 10*6/mm3 Final    Hemoglobin 08/09/2024 13.9  12.0 - 15.9 g/dL Final    Hematocrit 08/09/2024 42.8  34.0 - 46.6 % Final    MCV 08/09/2024 90.9  79.0 - 97.0 fL Final    MCH 08/09/2024 29.5  26.6 - 33.0 pg Final    MCHC 08/09/2024 32.5  31.5 - 35.7 g/dL Final    RDW 08/09/2024 13.0  12.3 - 15.4 % Final    Platelets 08/09/2024 267  140 - 450 10*3/mm3 Final    Glucose 08/09/2024 103 (H)  65 - 99 mg/dL Final    BUN 08/09/2024 19  8 - 23 mg/dL Final    Creatinine 08/09/2024 0.97  0.57 - 1.00 mg/dL Final    EGFR Result 08/09/2024 57.0 (L)  >60.0 mL/min/1.73 Final    Comment: GFR Normal >60  Chronic Kidney Disease <60  Kidney Failure <15  The GFR formula is only valid for adults with stable renal  function between ages 18 and 70.      BUN/Creatinine Ratio 08/09/2024 19.6  7.0 - 25.0 Final    Sodium 08/09/2024 139  136 - 145 mmol/L Final    Potassium 08/09/2024 4.5  3.5 - 5.2 mmol/L Final    Chloride 08/09/2024 106  98 - 107 mmol/L Final    Total CO2 08/09/2024 23.2  22.0 - 29.0 mmol/L Final    Calcium 08/09/2024 10.5  8.6 - 10.5 mg/dL Final    Total Protein 08/09/2024 6.7  6.0 - 8.5 g/dL Final    Albumin 08/09/2024 3.9  3.5 - 5.2 g/dL Final    Globulin 08/09/2024 2.8  gm/dL Final    A/G Ratio 08/09/2024 1.4  g/dL Final    Total Bilirubin 08/09/2024 0.2  0.0 - 1.2 mg/dL Final    Alkaline Phosphatase 08/09/2024 68  39 - 117 U/L Final    AST (SGOT)  08/09/2024 13  1 - 32 U/L Final    ALT (SGPT) 08/09/2024 12  1 - 33 U/L Final    TSH 08/09/2024 1.980  0.270 - 4.200 uIU/mL Final

## 2024-08-30 ENCOUNTER — TELEPHONE (OUTPATIENT)
Dept: INTERNAL MEDICINE | Facility: CLINIC | Age: 84
End: 2024-08-30
Payer: MEDICARE

## 2024-08-30 DIAGNOSIS — N30.00 ACUTE CYSTITIS WITHOUT HEMATURIA: Primary | ICD-10-CM

## 2024-08-30 RX ORDER — CIPROFLOXACIN 250 MG/1
250 TABLET, FILM COATED ORAL EVERY 12 HOURS SCHEDULED
Qty: 10 TABLET | Refills: 0 | Status: SHIPPED | OUTPATIENT
Start: 2024-08-30 | End: 2024-09-04

## 2024-08-30 NOTE — TELEPHONE ENCOUNTER
Caller: Mercedes Rizzo    Relationship: Emergency Contact    Best call back number: [phone]    What medication are you requesting:  A MEDICATION FOR ECOLI    What are your current symptoms: PATIENT HAS BEEN DIAGNOSED WITH ECOLI, SHE WAS EVALUATED BY [facility] YESTERDAY. PATIENT HAD A UTI A FEW WEEKS AGO AND WENT TO THE HOSPITAL AND WENT TO THE REHAB. LAST WEEK, SHE HAD ANOTHER UTI. THIS WEEK, SHE HAS ECOLI.       Have you had these symptoms before:    [x] Yes  [] No    Have you been treated for these symptoms before:   [x] Yes  [] No    If a prescription is needed, what is your preferred pharmacy and phone number:  [id number] [place] [address] AT [address] - [phone] [facility] [phone]  [phone]     Additional notes: PLEASE FOLLOW UP WITH MERCEDES RIZZO WHEN THIS HAS BEEN CALLED IN. SHE IS ON VERBAL.

## 2024-08-30 NOTE — TELEPHONE ENCOUNTER
Pt is allergic to Bactrim, PCN and Cephalosporins. Rx for Cipro sent to pharmacy. Please notify family, thanks.

## 2024-08-30 NOTE — TELEPHONE ENCOUNTER
Pt had a urine culture and UA done-- came back pos for e coli and daughter would like Rx put in before long weekend. Please call Mercedes for Rx updates so she can pick it up for pt    Results were faxed to office.      BALTAZAR Long:  Ph: [phone]

## 2024-08-30 NOTE — TELEPHONE ENCOUNTER
She stated that she was called by [facility] stating that patient has another UTI I assume E-Coli.    She was called by Kiet from [facility]- ph [phone].    C/S per Kiet verbal-     Recommended:    Bactrim 800/600 one tab bid x 3 days.    Resistant to tetracycline and clindamycin.     Repeated back verbal results to Kiet, he voiced this was correct. Fax to be sent again with results.

## 2024-09-03 PROBLEM — N18.31 STAGE 3A CHRONIC KIDNEY DISEASE: Status: ACTIVE | Noted: 2024-09-03

## 2024-09-03 PROBLEM — N18.31 STAGE 3A CHRONIC KIDNEY DISEASE: Chronic | Status: ACTIVE | Noted: 2024-09-03

## 2024-09-03 NOTE — PROGRESS NOTES
Chief Complaint  Hospital Follow Up Visit (DC from [facility] 8/2), Med Management (Discuss Tylenol dose, Levothyroxine dose, turmeric, Vit D), Insomnia, and Hypertension (Should HCTZ be included it patient's BP med?)  Subjective        Dee Mac presents to [facility]  History of Present Illness  History of Present Illness  The patient is an 84-year-old female who presents for hospital follow-up. She is accompanied by her son.    She was recently (6/29/24) hospitalized at [facility] and was subsequently transferred to [facility] for rehabilitation from 07/03/2024 until last Friday. She is now receiving home health. She was initially taken to the ER from [facility] due to increased confusion with vomiting, admitted for acute UTI and metabolic encephalopathy.    During her hospital stay, her verapamil dosage was increased to 240 mg due to elevated blood pressure readings, which were around 170/80.    She reports experiencing memory issues, including difficulty recalling words, and forgetting people's visits. She also struggles with recalling dates and appointments. She had previously encountered difficulties in obtaining her medications from the facility but her family has since organized her pill boxes. 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    Her sleep has been disrupted for several years, often staying awake until 5:00 a.m. and feeling tired during the day. Her son notes she was adhering to a normal schedule during her hospital stay and rehabilitation. She is unsure if she has previously tried melatonin.     She has been experiencing weekly falls, which she attributes to loss of balance. She is not participating in physical therapy.    She has been experiencing urinary incontinence which necessitates wearing pads and diapers. Her son found her unresponsive in a  "chair and unable to control her bladder. An alarm was set during rehab to use the bathroom every 2 hours during her rehabilitation stay which was beneficial. She has been leaning forward after using the bathroom in order to better empty her bladder.    She has been experiencing belching, which sometimes prevents her from speaking. She has been taking pantoprazole. She has been avoiding fried foods.    She has been diagnosed with stage 2 kidney failure. She has been taking Tylenol 325 mg 4 times a day, which has been effective. She rates her pain as 4 out of 10.    Supplemental Information  Her total cholesterol is 80, triglycerides are under 25.     She still has eye pain and it is also moving to the other eye, but not as bad. She feels like her left eye is twice as big as her right eye. She has seen her eye doctor and he could not refer her to a specialist, but he told her to see a neurologist. She had an MRI of her brain.      Objective   Vital Signs:  /76 (BP Location: Left arm, Patient Position: Sitting, Cuff Size: Large Adult)   Pulse 96   Temp 98 °F (36.7 °C)   Ht 152.4 cm (60\")   Wt 88.1 kg (194 lb 3.2 oz)   SpO2 96%   BMI 37.93 kg/m²   Estimated body mass index is 37.93 kg/m² as calculated from the following:    Height as of this encounter: 152.4 cm (60\").    Weight as of this encounter: 88.1 kg (194 lb 3.2 oz).    Class 2 Severe Obesity (BMI >=35 and <=39.9). Obesity-related health conditions include the following: hypertension, dyslipidemias, and osteoarthritis. Obesity is unchanged. BMI is is above average; BMI management plan is completed. We discussed portion control and increasing exercise.      Physical Exam  Constitutional:       Appearance: She is well-developed. She is not ill-appearing.   HENT:      Head: Normocephalic.      Right Ear: Hearing, tympanic membrane and external ear normal.      Left Ear: Hearing, tympanic membrane and external ear normal.      Nose: Nose normal. No nasal " deformity, mucosal edema or rhinorrhea.      Right Sinus: No maxillary sinus tenderness or frontal sinus tenderness.      Left Sinus: No maxillary sinus tenderness or frontal sinus tenderness.      Mouth/Throat:      Dentition: Normal dentition.   Eyes:      General: Lids are normal.         Right eye: No discharge.         Left eye: No discharge.      Conjunctiva/sclera: Conjunctivae normal.      Right eye: No exudate.     Left eye: No exudate.  Neck:      Thyroid: No thyroid mass or thyromegaly.      Vascular: No carotid bruit.      Trachea: Trachea normal.   Cardiovascular:      Rate and Rhythm: Regular rhythm.      Pulses: Normal pulses.      Heart sounds: Normal heart sounds. No murmur heard.  Pulmonary:      Effort: No respiratory distress.      Breath sounds: Normal breath sounds. No decreased breath sounds, wheezing, rhonchi or rales.   Abdominal:      General: Bowel sounds are normal.      Palpations: Abdomen is soft.      Tenderness: There is no abdominal tenderness.   Musculoskeletal:      Cervical back: Normal range of motion. No edema.      Comments: Ambulating with the assistance of a rollator   Lymphadenopathy:      Head:      Right side of head: No submental, submandibular, tonsillar, preauricular, posterior auricular or occipital adenopathy.      Left side of head: No submental, submandibular, tonsillar, preauricular, posterior auricular or occipital adenopathy.   Skin:     General: Skin is warm and dry.      Nails: There is no clubbing.   Neurological:      Mental Status: She is alert.   Psychiatric:         Behavior: Behavior is cooperative.        Physical Exam      Result Review :  The following data was reviewed by: MARTA Brink on 08/09/2024:  Common labs          7/3/2024    04:15 7/12/2024    23:00 8/9/2024    14:52   Common Labs   Glucose  107  103    BUN  15  19    Creatinine  1.16  0.97    Sodium  140  139    Potassium  4.5  4.5    Chloride  108  106    Calcium  9.1  10.5     Total Protein   6.7    Albumin  3.3  3.9    Total Bilirubin  <0.2  0.2    Alkaline Phosphatase  50  68    AST (SGOT)  12  13    ALT (SGPT)  9  12    WBC 10.52      10.88    Hemoglobin 12.6      13.9    Hematocrit 40.0      42.8    Platelets 189      267       Details          This result is from an external source.             Data reviewed : Recent hospitalization notes 6/29/24      Results  Laboratory Studies  Total cholesterol is 80, triglycerides are under 25.             Assessment and Plan   Diagnoses and all orders for this visit:    1. Primary hypertension (Primary)  Assessment & Plan:  During her hospital stay, her verapamil dosage was increased to 240 mg due to elevated blood pressure readings, which were around 170/80.    Orders:  -     CBC (No Diff)  -     Comprehensive Metabolic Panel    2. Frequent falls  Comments:  She would benefit from PT through Home Health    3. Acquired hypothyroidism  Assessment & Plan:  She is currently alternating Synthroid 100 mcg with 88 mcg daily, recheck TSH.    Orders:  -     TSH    4. Stage 3a chronic kidney disease  Assessment & Plan:  She was advised to maintain hydration, avoid Advil or Aleve, and use Tylenol for headaches or fever as needed. Blood work was ordered.      5. Mixed stress and urge urinary incontinence  Assessment & Plan:  Gemtesa was suggested, along with bladder retraining and exercises to strengthen the muscles. She was advised to set an alarm to use the bathroom every 2 hours, lean forward after using the bathroom, and limit her caffeine intake.      6. Primary insomnia  Assessment & Plan:  Melatonin 10 mg was suggested, with the option to halve or capsule as needed.      7. Memory loss  -     Ambulatory Referral to Neurology      Assessment & Plan           Follow Up   Return in about 4 months (around 12/9/2024).  Patient was given instructions and counseling regarding her condition or for health maintenance advice. Please see specific information  pulled into the AVS if appropriate.     Patient or patient representative verbalized consent for the use of Ambient Listening during the visit with  MARTA Brink for chart documentation. 9/3/2024  08:03 EDT

## 2024-09-03 NOTE — ASSESSMENT & PLAN NOTE
Gemtesa was suggested, along with bladder retraining and exercises to strengthen the muscles. She was advised to set an alarm to use the bathroom every 2 hours, lean forward after using the bathroom, and limit her caffeine intake.

## 2024-09-03 NOTE — ASSESSMENT & PLAN NOTE
She was advised to maintain hydration, avoid Advil or Aleve, and use Tylenol for headaches or fever as needed. Blood work was ordered.

## 2024-09-08 ENCOUNTER — PATIENT MESSAGE (OUTPATIENT)
Dept: INTERNAL MEDICINE | Facility: CLINIC | Age: 84
End: 2024-09-08
Payer: MEDICARE

## 2024-09-23 ENCOUNTER — PATIENT MESSAGE (OUTPATIENT)
Dept: INTERNAL MEDICINE | Facility: CLINIC | Age: 84
End: 2024-09-23
Payer: MEDICARE

## 2024-09-23 DIAGNOSIS — E03.9 ACQUIRED HYPOTHYROIDISM: ICD-10-CM

## 2024-09-23 DIAGNOSIS — E03.9 ACQUIRED HYPOTHYROIDISM: Chronic | ICD-10-CM

## 2024-09-24 ENCOUNTER — TELEPHONE (OUTPATIENT)
Dept: INTERNAL MEDICINE | Facility: CLINIC | Age: 84
End: 2024-09-24
Payer: MEDICARE

## 2024-09-24 RX ORDER — SENNOSIDES A AND B 8.6 MG/1
1 TABLET, FILM COATED ORAL DAILY
Qty: 90 TABLET | Refills: 1 | Status: SHIPPED | OUTPATIENT
Start: 2024-09-24

## 2024-09-24 RX ORDER — PANTOPRAZOLE SODIUM 40 MG/1
40 TABLET, DELAYED RELEASE ORAL 2 TIMES DAILY
Qty: 180 TABLET | Refills: 1 | Status: SHIPPED | OUTPATIENT
Start: 2024-09-24

## 2024-09-24 RX ORDER — VALSARTAN 160 MG/1
160 TABLET ORAL DAILY
Qty: 90 TABLET | Refills: 1 | Status: SHIPPED | OUTPATIENT
Start: 2024-09-24

## 2024-09-24 RX ORDER — LEVOTHYROXINE SODIUM 100 UG/1
TABLET ORAL
Qty: 90 TABLET | Refills: 1 | Status: SHIPPED | OUTPATIENT
Start: 2024-09-24

## 2024-09-24 RX ORDER — LEVOTHYROXINE SODIUM 88 UG/1
88 TABLET ORAL 2 TIMES WEEKLY
Qty: 90 TABLET | Refills: 1 | Status: SHIPPED | OUTPATIENT
Start: 2024-09-26

## 2024-10-01 ENCOUNTER — TELEPHONE (OUTPATIENT)
Dept: INTERNAL MEDICINE | Facility: CLINIC | Age: 84
End: 2024-10-01
Payer: MEDICARE

## 2024-10-01 NOTE — TELEPHONE ENCOUNTER
Caller: Dee Watts    Relationship: Self    Best call back number:     [phone]        What medication are you requesting: COUGH MEDICATION     What are your current symptoms: COUGH FOR TWO DAYS    Have you had these symptoms before:    [x] Yes  [] No    Have you been treated for these symptoms before:   [x] Yes  [] No    If a prescription is needed, what is your preferred pharmacy and phone number: [id number] [address] AT [facility] [phone] [facility] [phone]      Additional notes:  PATIENT THINKS IT IS DUE TO ALLERGIES AND WOULD LIKE A PRESCRIPTION

## 2024-10-02 ENCOUNTER — TELEMEDICINE (OUTPATIENT)
Dept: INTERNAL MEDICINE | Facility: CLINIC | Age: 84
End: 2024-10-02
Payer: MEDICARE

## 2024-10-02 DIAGNOSIS — J06.9 VIRAL URI WITH COUGH: ICD-10-CM

## 2024-10-02 DIAGNOSIS — J30.89 NON-SEASONAL ALLERGIC RHINITIS, UNSPECIFIED TRIGGER: Primary | Chronic | ICD-10-CM

## 2024-10-02 PROCEDURE — 99213 OFFICE O/P EST LOW 20 MIN: CPT | Performed by: NURSE PRACTITIONER

## 2024-10-02 PROCEDURE — 1160F RVW MEDS BY RX/DR IN RCRD: CPT | Performed by: NURSE PRACTITIONER

## 2024-10-02 PROCEDURE — 1125F AMNT PAIN NOTED PAIN PRSNT: CPT | Performed by: NURSE PRACTITIONER

## 2024-10-02 PROCEDURE — 1159F MED LIST DOCD IN RCRD: CPT | Performed by: NURSE PRACTITIONER

## 2024-10-02 RX ORDER — FEXOFENADINE HCL 180 MG/1
180 TABLET ORAL DAILY
Qty: 90 TABLET | Refills: 1 | Status: SHIPPED | OUTPATIENT
Start: 2024-10-02

## 2024-10-02 RX ORDER — BENZONATATE 200 MG/1
200 CAPSULE ORAL 3 TIMES DAILY PRN
Qty: 30 CAPSULE | Refills: 0 | Status: SHIPPED | OUTPATIENT
Start: 2024-10-02

## 2024-10-02 NOTE — TELEPHONE ENCOUNTER
Spoke with patient, worked in for video visit at 12:45. Sent to yvette to schedule. ANIKA advised via Diana

## 2024-10-21 NOTE — PROGRESS NOTES
"Chief Complaint  URI    Kera Watts presents to [facility] due to respiratory symptoms.    You have chosen to receive care through a telehealth visit.  Do you consent to use a video/audio connection for your medical care today? Yes  Mode of Visit: Video  Location of patient: home  Location of provider: [facility] clinic  You have chosen to receive care through a telehealth visit.  Does the patient consent to use a video/audio connection for your medical care today? Yes  The visit included audio and video interaction. No technical issues occurred during this visit.     History of Present Illness    She presents via video visit with her son due to a cough over the past several days. She c/o increased congestion with postnasal drainage over the past week without fever and/or chills. She c/o a dry, nonproductive cough without dyspnea. Denies chest pain and/or palpitations. No fever and/or chills.    She has not had a home COVID-19 test but denies myalgias. No GI symptoms.    Objective   Vital Signs:  There were no vitals taken for this visit.  Estimated body mass index is 37.93 kg/m² as calculated from the following:    Height as of 8/9/24: 152.4 cm (60\").    Weight as of 8/9/24: 88.1 kg (194 lb 3.2 oz).       BMI is within normal parameters. No other follow-up for BMI required.      Physical Exam  Constitutional:       Appearance: She is well-developed.   HENT:      Head: Normocephalic and atraumatic.   Eyes:      General:         Right eye: No discharge.         Left eye: No discharge.      Conjunctiva/sclera: Conjunctivae normal.   Pulmonary:      Effort: Pulmonary effort is normal.   Neurological:      Mental Status: She is alert and oriented to person, place, and time.   Psychiatric:         Behavior: Behavior normal.         Thought Content: Thought content normal.         Judgment: Judgment normal.        Result Review :                   Assessment and Plan "   Diagnoses and all orders for this visit:    1. Non-seasonal allergic rhinitis, unspecified trigger (Primary)  -     fexofenadine (ALLEGRA) 180 MG tablet; Take 1 tablet by mouth Daily.  Dispense: 90 tablet; Refill: 1    2. Viral URI with cough  -     benzonatate (TESSALON) 200 MG capsule; Take 1 capsule by mouth 3 (Three) Times a Day As Needed for Cough.  Dispense: 30 capsule; Refill: 0      Sx appear more viral in nature, will check home COVID-19 test and notify office with results. However, she will begin Allegra daily for increased drainage and Tessalon perles as needed for dry cough (may also use Delsym). RTC if sx persist and/or worsen.    History and medical judgement obtained from video conversation with patient. No hands-on physical examination was performed. Approximately 13 minutes spent in video conversation with patient and in chart review.         Follow Up   No follow-ups on file.  Patient was given instructions and counseling regarding her condition or for health maintenance advice. Please see specific information pulled into the AVS if appropriate.

## 2024-12-09 ENCOUNTER — OFFICE VISIT (OUTPATIENT)
Dept: INTERNAL MEDICINE | Facility: CLINIC | Age: 84
End: 2024-12-09
Payer: MEDICARE

## 2024-12-09 VITALS
DIASTOLIC BLOOD PRESSURE: 84 MMHG | OXYGEN SATURATION: 97 % | HEART RATE: 73 BPM | HEIGHT: 60 IN | TEMPERATURE: 98 F | SYSTOLIC BLOOD PRESSURE: 136 MMHG | BODY MASS INDEX: 37.93 KG/M2

## 2024-12-09 DIAGNOSIS — R29.6 FREQUENT FALLS: ICD-10-CM

## 2024-12-09 DIAGNOSIS — N39.0 ACUTE UTI: ICD-10-CM

## 2024-12-09 DIAGNOSIS — N18.31 STAGE 3A CHRONIC KIDNEY DISEASE: Chronic | ICD-10-CM

## 2024-12-09 DIAGNOSIS — G44.039 EPISODIC PAROXYSMAL HEMICRANIA, NOT INTRACTABLE: Chronic | ICD-10-CM

## 2024-12-09 DIAGNOSIS — Z00.00 MEDICARE ANNUAL WELLNESS VISIT, SUBSEQUENT: Primary | ICD-10-CM

## 2024-12-09 DIAGNOSIS — M54.50 CHRONIC RIGHT-SIDED LOW BACK PAIN WITHOUT SCIATICA: Chronic | ICD-10-CM

## 2024-12-09 DIAGNOSIS — G89.29 CHRONIC RIGHT-SIDED LOW BACK PAIN WITHOUT SCIATICA: Chronic | ICD-10-CM

## 2024-12-09 RX ORDER — POLYETHYLENE GLYCOL 3350 17 G/17G
17 POWDER, FOR SOLUTION ORAL AS NEEDED
COMMUNITY
Start: 2024-11-29

## 2024-12-29 PROBLEM — R41.82 AMS (ALTERED MENTAL STATUS): Status: RESOLVED | Noted: 2023-04-01 | Resolved: 2024-12-29

## 2024-12-29 PROBLEM — R29.6 FREQUENT FALLS: Status: ACTIVE | Noted: 2024-12-29

## 2024-12-29 PROBLEM — R53.1 WEAKNESS: Status: RESOLVED | Noted: 2023-04-02 | Resolved: 2024-12-29

## 2024-12-30 NOTE — ASSESSMENT & PLAN NOTE
She reports severe back pain that worsens with sitting. The pain is localized to the lower back and does not extend higher. She uses a recliner with heat and vibration functions at home, which provides some relief. Physical therapy is recommended to address her back pain. She is advised to continue using her recliner for relief. Will obtain PT through assisted living, continue to monitor.

## 2024-12-30 NOTE — ASSESSMENT & PLAN NOTE
She has a history of falls, including a recent fall at the rehab facility. She is at high risk for falls due to mobility issues and vertigo. It is recommended that she have a caregiver to assist with activities of daily living and to help prevent falls.

## 2024-12-30 NOTE — ASSESSMENT & PLAN NOTE
She also reports headaches that originate behind her eyes and radiate to her ears and hands which have been recurrent, no acute abnl on MRI brain and/or ophthalmology consult.

## 2024-12-30 NOTE — PROGRESS NOTES
Subjective   The ABCs of the Annual Wellness Visit  Medicare Wellness Visit      Dee Watts is a 84 y.o. patient who presents for a Medicare Wellness Visit.    The following portions of the patient's history were reviewed and   updated as appropriate: allergies, current medications, past family history, past medical history, past social history, past surgical history, and problem list.    Compared to one year ago, the patient's physical   health is the same.  Compared to one year ago, the patient's mental   health is the same.    Recent Hospitalizations:  She was not admitted to the hospital during the last year.     Current Medical Providers:  Patient Care Team:  Susanne Sharma APRN as PCP - General (Internal Medicine)  Pedro Sy MD as Surgeon (Orthopedic Surgery)  Keith Dorsey MD as Surgeon (Orthopedic Surgery)  Kira Beal MD (Dermatology)  Jadon Jones MD as Consulting Physician (Ophthalmology)  Edgar Tello MD as Consulting Physician (Otolaryngology)    Outpatient Medications Prior to Visit   Medication Sig Dispense Refill    benzonatate (TESSALON) 200 MG capsule Take 1 capsule by mouth 3 (Three) Times a Day As Needed for Cough. 30 capsule 0    Cranberry-Vitamin C-Inulin (UTI-STAT PO) Take  by mouth As Needed.      escitalopram (LEXAPRO) 20 MG tablet TAKE 1 TABLET BY MOUTH ONCE DAILY 90 tablet 1    fexofenadine (ALLEGRA) 180 MG tablet Take 1 tablet by mouth Daily. 90 tablet 1    levothyroxine (SYNTHROID, LEVOTHROID) 100 MCG tablet Take 1 tablet daily Monday through Friday 90 tablet 1    levothyroxine (SYNTHROID, LEVOTHROID) 88 MCG tablet Take 1 tablet by mouth 2 (Two) Times a Week. Saturday & Sunday 90 tablet 1    ondansetron ODT (ZOFRAN-ODT) 8 MG disintegrating tablet Place 1 tablet on the tongue Every 12 (Twelve) Hours As Needed for Nausea or Vomiting. 30 tablet 0    pantoprazole (PROTONIX) 40 MG EC tablet Take 1 tablet by mouth 2 (Two) Times a Day. 180 tablet 1     polyethylene glycol (MIRALAX) 17 g packet Take 17 g by mouth As Needed.      raloxifene (EVISTA) 60 MG tablet TAKE 1 TABLET BY MOUTH ONCE DAILY 90 tablet 1    senna (SENOKOT) 8.6 MG tablet Take 1 tablet by mouth Daily. 90 tablet 1    valsartan (DIOVAN) 160 MG tablet Take 1 tablet by mouth Daily. 90 tablet 1    verapamil SR (CALAN-SR) 240 MG CR tablet TAKE 1 TABLET BY MOUTH ONCE DAILY 90 tablet 1    Lactobacillus (FLORANEX PO) Take  by mouth. (Patient not taking: Reported on 12/9/2024)       No facility-administered medications prior to visit.     No opioid medication identified on active medication list. I have reviewed chart for other potential  high risk medication/s and harmful drug interactions in the elderly.      Aspirin is not on active medication list.  Aspirin use is not indicated based on review of current medical condition/s. Risk of harm outweighs potential benefits.  .    Patient Active Problem List   Diagnosis    DDD (degenerative disc disease), lumbar    Osteoarthritis of hip    Lumbosacral neuritis    Lumbar canal stenosis    Hypothyroidism    Hypertension    Mood disorder    Vitamin D deficiency    IBS (irritable bowel syndrome)    Osteoporosis    Migraines    Chronic breast pain    Status post total hip replacement, right    Mixed stress and urge urinary incontinence    Allergic rhinitis    Episodic paroxysmal hemicrania, not intractable    Vertigo    Obesity, morbid    Hypercalcemia    Chronic right-sided low back pain without sciatica    Acute UTI    Nausea and vomiting    Pain in right eye    Memory loss    Intertrigo    Stage 3a chronic kidney disease    Insomnia    Frequent falls     Advance Care Planning Advance Directive is on file.  ACP discussion was held with the patient during this visit. Patient has an advance directive in EMR which is still valid.             Objective   Vitals:    12/09/24 1537   BP: 136/84   BP Location: Left arm   Patient Position: Sitting   Cuff Size: Large Adult  "  Pulse: 73   Temp: 98 °F (36.7 °C)   SpO2: 97%   Weight: Comment:  - 192.2lbs   Height: 152.4 cm (60\")   PainSc:   8   PainLoc: Back       Estimated body mass index is 37.93 kg/m² as calculated from the following:    Height as of this encounter: 152.4 cm (60\").    Weight as of 24: 88.1 kg (194 lb 3.2 oz).                Does the patient have evidence of cognitive impairment? No                                                                                                Health  Risk Assessment    Smoking Status:  Social History     Tobacco Use   Smoking Status Former    Current packs/day: 0.00    Average packs/day: 0.3 packs/day for 4.0 years (1.0 ttl pk-yrs)    Types: Cigarettes    Start date:     Quit date: 1990    Years since quittin.0   Smokeless Tobacco Never     Alcohol Consumption:  Social History     Substance and Sexual Activity   Alcohol Use Yes    Comment: 1 DRINK PER WEEK       Fall Risk Screen  STEADI Fall Risk Assessment was completed, and patient is at HIGH risk for falls. Assessment completed on:2024    Depression Screening   Little interest or pleasure in doing things? Not at all   Feeling down, depressed, or hopeless? Over half   PHQ-2 Total Score 2   Trouble falling or staying asleep, or sleeping too much? Almost all   Feeling tired or having little energy? Almost all   Poor appetite or overeating? Almost all   Feeling bad about yourself - or that you are a failure or have let yourself or your family down? Not at all   Trouble concentrating on things, such as reading the newspaper or watching television? Not at all   Moving or speaking so slowly that other people could have noticed? Or the opposite - being so fidgety or restless that you have been moving around a lot more than usual? Not at all   Thoughts that you would be better off dead, or of hurting yourself in some way? Not at all   PHQ-9 Total Score 11   If you checked off any problems, how difficult have these " problems made it for you to do your work, take care of things at home, or get along with other people? Not difficult at all      Health Habits and Functional and Cognitive Screenin/9/2024     3:47 PM   Functional & Cognitive Status   Do you have difficulty preparing food and eating? Yes   Do you have difficulty bathing yourself, getting dressed or grooming yourself? Yes   Do you have difficulty using the toilet? No   Do you have difficulty moving around from place to place? No   Do you have trouble with steps or getting out of a bed or a chair? Yes   Exercise (times per week) 3 times per week        Exercise Frequency Comment at rehab   Current Exercises Include --        Exercise Comment physical therapy   Do you need help using the phone?  No   Are you deaf or do you have serious difficulty hearing?  No   Do you need help to go to places out of walking distance? Yes   Do you need help shopping? Yes   Do you need help preparing meals?  Yes   Do you need help with housework?  Yes   Do you need help with laundry? Yes   Do you need help taking your medications? No   Do you need help managing money? No   Do you ever drive or ride in a car without wearing a seat belt? No   Have you felt unusual stress, anger or loneliness in the last month? Yes   Who do you live with? Community   If you need help, do you have trouble finding someone available to you? No   Have you been bothered in the last four weeks by sexual problems? No   Do you have difficulty concentrating, remembering or making decisions? Yes           Age-appropriate Screening Schedule:  Refer to the list below for future screening recommendations based on patient's age, sex and/or medical conditions. Orders for these recommended tests are listed in the plan section. The patient has been provided with a written plan.    Health Maintenance List  Health Maintenance   Topic Date Due    RSV Vaccine - Adults (1 - 1-dose 75+ series) Never done    ZOSTER  VACCINE (2 of 3) 08/22/2013    DXA SCAN  07/20/2023    ANNUAL WELLNESS VISIT  09/22/2024    LIPID PANEL  01/25/2025    COVID-19 Vaccine (4 - 2024-25 season) 12/09/2025 (Originally 9/1/2024)    BMI FOLLOWUP  08/09/2025    TDAP/TD VACCINES (3 - Td or Tdap) 01/16/2032    INFLUENZA VACCINE  Completed    Pneumococcal Vaccine 65+  Completed                                                                                                                                                CMS Preventative Services Quick Reference  Risk Factors Identified During Encounter  Fall Risk-High or Moderate: Discussed Fall Prevention in the home  Immunizations Discussed/Encouraged: Shingrix and RSV (Respiratory Syncytial Virus)  Medication compliance-home health states she is not taking medication consistently    The above risks/problems have been discussed with the patient.  Pertinent information has been shared with the patient in the After Visit Summary.  An After Visit Summary and PPPS were made available to the patient.    Follow Up:   Next Medicare Wellness visit to be scheduled in 1 year.         Additional E&M Note during same encounter follows:  Patient has additional, significant, and separately identifiable condition(s)/problem(s) that require work above and beyond the Medicare Wellness Visit     Chief Complaint  Medicare Wellness-subsequent and Eczema (Dryness on scalp, goes into ears )    Subjective    HPI  Dee is also being seen today for additional medical problem/s.       The patient is an 84-year-old female who presents for evaluation of multiple medical concerns. She is accompanied by her son whom she has given permission to be present.    She reports severe back pain, which is exacerbated by sitting. She has a history of spinal fusion and currently uses a recliner with heat and vibration functions at home. She was recently treated for a UTI with a gradual improvement in sx. She reports difficulty sleeping at night  "due to discomfort, takes Tylenol as needed for pain control.    She also reports headaches that originate behind her eyes and radiate to her ears and hands which have been recurrent, no acute abnl on MRI brain and/or ophthalmology consult.    She has a history of falls including a recent incident at the rehabilitation center. She has a history of knee injuries from falls, including a recent fall at the rehabilitation center. She is currently receiving therapy twice daily and uses a small towel for neck support during sleep.    She reports chronic scalp dryness, which she manages with T-Gel shampoo.     She received her influenza vaccine a week ago.    Review of Systems   Constitutional:  Negative for fatigue.   HENT:  Positive for congestion, postnasal drip and rhinorrhea.    Respiratory:  Negative for cough, chest tightness and shortness of breath.    Cardiovascular:  Negative for chest pain, palpitations and leg swelling.   Gastrointestinal:  Negative for abdominal pain.   Musculoskeletal:  Positive for arthralgias and back pain.          Objective   Vital Signs:  /84 (BP Location: Left arm, Patient Position: Sitting, Cuff Size: Large Adult)   Pulse 73   Temp 98 °F (36.7 °C)   Ht 152.4 cm (60\")   SpO2 97%   BMI 37.93 kg/m²   Physical Exam  Constitutional:       Appearance: She is well-developed. She is not ill-appearing.   HENT:      Head: Normocephalic.      Right Ear: Hearing, tympanic membrane and external ear normal.      Left Ear: Hearing, tympanic membrane and external ear normal.      Nose: Nose normal. No nasal deformity, mucosal edema or rhinorrhea.      Right Sinus: No maxillary sinus tenderness or frontal sinus tenderness.      Left Sinus: No maxillary sinus tenderness or frontal sinus tenderness.      Mouth/Throat:      Dentition: Normal dentition.   Eyes:      General: Lids are normal.         Right eye: No discharge.         Left eye: No discharge.      Conjunctiva/sclera: Conjunctivae " normal.      Right eye: No exudate.     Left eye: No exudate.  Neck:      Thyroid: No thyroid mass or thyromegaly.      Vascular: No carotid bruit.      Trachea: Trachea normal.   Cardiovascular:      Rate and Rhythm: Regular rhythm.      Pulses: Normal pulses.      Heart sounds: Normal heart sounds. No murmur heard.  Pulmonary:      Effort: No respiratory distress.      Breath sounds: Normal breath sounds. No decreased breath sounds, wheezing, rhonchi or rales.   Abdominal:      General: Bowel sounds are normal.      Palpations: Abdomen is soft.      Tenderness: There is no abdominal tenderness.   Musculoskeletal:      Cervical back: Normal range of motion. No edema.      Comments: Pt is in a wheelchair for assistance   Lymphadenopathy:      Head:      Right side of head: No submental, submandibular, tonsillar, preauricular, posterior auricular or occipital adenopathy.      Left side of head: No submental, submandibular, tonsillar, preauricular, posterior auricular or occipital adenopathy.   Skin:     General: Skin is warm and dry.      Nails: There is no clubbing.   Neurological:      Mental Status: She is alert.   Psychiatric:         Behavior: Behavior is cooperative.           Vital Signs  Blood pressure is 130/74.    The following data was reviewed by: MARTA Brink on 12/09/2024:    Common labs          7/12/2024    23:00 8/9/2024    14:52 12/3/2024    16:45   Common Labs   Glucose 107  103  105    BUN 15  19  25    Creatinine 1.16  0.97  1.04    Sodium 140  139  135    Potassium 4.5  4.5  4.4    Chloride 108  106  101    Calcium 9.1  10.5  10.2    Total Protein  6.7     Albumin 3.3  3.9  3.4    Total Bilirubin <0.2  0.2  0.2    Alkaline Phosphatase 50  68  77    AST (SGOT) 12  13  16    ALT (SGPT) 9  12  13    WBC  10.88  12.29    Hemoglobin  13.9  13.0    Hematocrit  42.8  39.1    Platelets  267  339        Results  Laboratory Studies  Blood sugar was normal. Kidney function was slightly decreased  but stable. Liver enzymes were normal. No evidence of anemia. White blood cell count was high at 12,000 but decreased to 8900 after starting medication. Hemoglobin and hematocrit were slightly low but normal at the time of the visit. GFR was 57, indicating decreased kidney function.              Assessment and Plan Additional age appropriate preventative wellness advice topics were discussed during today's preventative wellness exam(some topics already addressed during AWV portion of the note above):   Concerns over medication compliance         Health Maintenance.  She received her flu shot about a week ago. She has decided not to receive the latest COVID-19 booster.       Diagnoses and all orders for this visit:    1. Medicare annual wellness visit, subsequent (Primary)    2. Acute UTI  Assessment & Plan:  She has recently been treated for an acute UTI, sx improved.      3. Chronic right-sided low back pain without sciatica  Assessment & Plan:  She reports severe back pain that worsens with sitting. The pain is localized to the lower back and does not extend higher. She uses a recliner with heat and vibration functions at home, which provides some relief. Physical therapy is recommended to address her back pain. She is advised to continue using her recliner for relief. Will obtain PT through assisted living, continue to monitor.      4. Stage 3a chronic kidney disease  Assessment & Plan:  Her recent blood work shows a slight decrease in kidney function, with a GFR of 57. She is advised to maintain adequate hydration, ensure blood pressure control, and avoid Advil or Aleve. She should continue taking acetaminophen as needed for pain.      5. Frequent falls  Assessment & Plan:  She has a history of falls, including a recent fall at the rehab facility. She is at high risk for falls due to mobility issues and vertigo. It is recommended that she have a caregiver to assist with activities of daily living and to help  prevent falls.      6. Episodic paroxysmal hemicrania, not intractable  Assessment & Plan:  She also reports headaches that originate behind her eyes and radiate to her ears and hands which have been recurrent, no acute abnl on MRI brain and/or ophthalmology consult.               Follow Up   Return in about 6 months (around 6/9/2025).  Patient was given instructions and counseling regarding her condition or for health maintenance advice. Please see specific information pulled into the AVS if appropriate.  Patient or patient representative verbalized consent for the use of Ambient Listening during the visit with  MARTA Brink for chart documentation. 12/29/2024  20:28 EST

## 2024-12-30 NOTE — ASSESSMENT & PLAN NOTE
Her recent blood work shows a slight decrease in kidney function, with a GFR of 57. She is advised to maintain adequate hydration, ensure blood pressure control, and avoid Advil or Aleve. She should continue taking acetaminophen as needed for pain.

## 2024-12-30 NOTE — PATIENT INSTRUCTIONS
Medicare Wellness  Personal Prevention Plan of Service     Date of Office Visit:    Encounter Provider:  MARTA Brink  Place of Service:  [facility]  Patient Name: Dee Watts  :  1940    As part of the Medicare Wellness portion of your visit today, we are providing you with this personalized preventive plan of services (PPPS). This plan is based upon recommendations of the [place] Preventive Services Task Force (USPSTF) and the Advisory Committee on Immunization Practices (ACIP).    This lists the preventive care services that should be considered, and provides dates of when you are due. Items listed as completed are up-to-date and do not require any further intervention.    Health Maintenance   Topic Date Due    RSV Vaccine - Adults (1 - 1-dose 75+ series) Never done    ZOSTER VACCINE (2 of 3) 2013    DXA SCAN  2023    ANNUAL WELLNESS VISIT  2024    LIPID PANEL  2025    COVID-19 Vaccine (4 - - season) 2025 (Originally 2024)    BMI FOLLOWUP  2025    TDAP/TD VACCINES (3 - Td or Tdap) 2032    INFLUENZA VACCINE  Completed    Pneumococcal Vaccine 65+  Completed       No orders of the defined types were placed in this encounter.      Return in about 6 months (around 2025).

## 2025-01-09 ENCOUNTER — TELEPHONE (OUTPATIENT)
Dept: NEUROLOGY | Facility: CLINIC | Age: 85
End: 2025-01-09
Payer: MEDICARE

## 2025-01-17 ENCOUNTER — OFFICE VISIT (OUTPATIENT)
Dept: NEUROLOGY | Facility: CLINIC | Age: 85
End: 2025-01-17
Payer: MEDICARE

## 2025-01-17 VITALS
WEIGHT: 192 LBS | BODY MASS INDEX: 37.69 KG/M2 | SYSTOLIC BLOOD PRESSURE: 122 MMHG | HEIGHT: 60 IN | HEART RATE: 89 BPM | OXYGEN SATURATION: 95 % | DIASTOLIC BLOOD PRESSURE: 82 MMHG

## 2025-01-17 DIAGNOSIS — R41.89 COGNITIVE CHANGES: ICD-10-CM

## 2025-01-17 DIAGNOSIS — R42 DIZZINESS: Primary | ICD-10-CM

## 2025-01-17 RX ORDER — TRAMADOL HYDROCHLORIDE 50 MG/1
TABLET ORAL
COMMUNITY
Start: 2024-12-26

## 2025-01-17 RX ORDER — BUPROPION HYDROCHLORIDE 300 MG/1
TABLET ORAL
COMMUNITY
Start: 2024-12-24

## 2025-01-17 RX ORDER — FEXOFENADINE HCL 180 MG/1
TABLET ORAL DAILY
COMMUNITY
Start: 2024-11-29

## 2025-01-17 RX ORDER — BISACODYL 10 MG
SUPPOSITORY, RECTAL RECTAL DAILY
COMMUNITY
Start: 2024-11-29

## 2025-01-17 RX ORDER — VENLAFAXINE HYDROCHLORIDE 75 MG/1
CAPSULE, EXTENDED RELEASE ORAL DAILY
COMMUNITY
Start: 2025-01-17

## 2025-01-17 RX ORDER — UBIDECARENONE 75 MG
CAPSULE ORAL DAILY
COMMUNITY
Start: 2024-11-29

## 2025-01-17 NOTE — LETTER
January 17, 2025     MARTA Curry  [address]  [address]  [facility] [address]    Patient: Dee Watts   YOB: 1940   Date of Visit: 1/17/2025     Dear MARTA Curry:       Thank you for referring Dee Watts to me for evaluation. Below are the relevant portions of my assessment and plan of care.    If you have questions, please do not hesitate to call me. I look forward to following Dee along with you.         Sincerely,        Yakov Beltran II, MD        CC: No Recipients    Yakov Beltran II, MD  01/17/25 8335  Sign when Signing Visit  Chief complaint: History of memory change and dizziness.      Patient ID: Dee Watts is a 84 y.o. female.    HPI: I have had the pleasure of seeing your patient today.  As you may know she is an 84-year-old female here for the evaluation and treatment of memory change.  Her son who accompanies her today states that she has had some memory issues for the past couple of years.  Is typically short-term memory issues.  She has been noted to repeat herself often.  She may asked the same question multiple times.  She was having issues maintaining her own medications and with daily orientation.  She is now living in an assisted living facility.  There is no family history of dementia.  She does have a history of dizziness.  The dizziness is described as a movement like sensation.  She says if she looks down or tilts her head she will continue to move.  Typically  will last for several minutes at a time.  She typically does not have focal weakness or numbness with it.  She has had several UTIs previously.  Her son says that after the most recent urinary tract infection last year, she was not able to reach her baseline level of cognition.  No history of severe head trauma.  She does have chronic neck pain with some mild headaches.  MRI imaging back in 2023 showed mild small vessel ischemic change as well as some cortical  atrophy.  Her dizziness is essentially daily.  She does have balance issues because of the dizziness.    The following portions of the patient's history were reviewed and updated as appropriate: allergies, current medications, past family history, past medical history, past social history, past surgical history and problem list.    Review of Systems   Constitutional:  Positive for fatigue.   HENT:  Negative for hearing loss.    Eyes:  Positive for pain (pain behind both eyes).   Musculoskeletal:  Positive for gait problem (multiple falls, with injury).   Neurological:  Positive for dizziness, tremors, weakness and light-headedness.   Psychiatric/Behavioral:  Positive for confusion and decreased concentration.       I have reviewed the review of systems above performed by my medical assistant.      Vitals:    01/17/25 1405   BP: 122/82   Pulse: 89   SpO2: 95%       Neurological Exam  Mental Status  Awake, alert and oriented to person, place and time. Recalls 3 of 3 objects immediately. At 3 minutes recalls 0 of 3 objects. Recalls 0 of 3 objects with prompting. Speech is normal. Language is fluent with no aphasia. Attention and concentration are normal. Fund of knowledge is appropriate for level of education. MMSE score: 26.    Cranial Nerves  CN I: Sense of smell is normal.  CN II: Visual acuity is normal.  CN III, IV, VI: Extraocular movements intact bilaterally. Pupils equal round and reactive to light bilaterally.  CN V: Facial sensation is normal.  CN VII: Full and symmetric facial movement.  CN XI: Shoulder shrug strength is normal.  CN XII: Tongue midline without atrophy or fasciculations.    Motor  Normal muscle bulk throughout. No fasciculations present. Normal muscle tone. No abnormal involuntary movements. No pronator drift.                                             Right                     Left  Rhomboids                            5                          5  Infraspinatus                          5                           5  Supraspinatus                       5                          5  Deltoid                                   5                          5   Biceps                                   5                          5  Brachioradialis                      5                          5   Triceps                                  5                          5   Pronator                                5                          5   Supinator                              5                           5   Wrist flexor                            5                          5   Wrist extensor                       5                          5   Finger flexor                          5                          5   Finger extensor                     5                          5   Interossei                              5                          5   Abductor pollicis brevis         5                          5   Flexor pollicis brevis             5                          5   Opponens pollicis                 5                          5  Extensor digitorum               5                          5  Abductor digiti minimi           5                          5   Abdominal                            5                          5  Glutei                                    5                          5  Hip abductor                         5                          5  Hip adductor                         5                          5   Iliopsoas                               5                          5   Quadriceps                           5                          5   Hamstring                             5                          5   Gastrocnemius                     5                           5   Anterior tibialis                      5                          5   Posterior tibialis                    5                          5   Peroneal                               5                          5  Ankle  dorsiflexor                   5                          5  Ankle plantar flexor              5                           5  Extensor hallucis longus      5                           5    Sensory  Sensation is intact to light touch, pinprick, vibration and proprioception in all four extremities.    Reflexes  Deep tendon reflexes are 2+ and symmetric in all four extremities.    Right pathological reflexes: Randi's absent.  Left pathological reflexes: Randi's absent.    Coordination    Finger-to-nose, rapid alternating movements and heel-to-shin normal bilaterally without dysmetria.    Gait  Normal casual, toe, heel and tandem gait.       Physical Exam  Vitals reviewed.   Constitutional:       General: She is not in acute distress.     Appearance: She is well-developed.   HENT:      Head: Normocephalic and atraumatic.   Eyes:      Extraocular Movements: Extraocular movements intact.      Pupils: Pupils are equal, round, and reactive to light.   Cardiovascular:      Rate and Rhythm: Normal rate and regular rhythm.      Heart sounds: Normal heart sounds.   Pulmonary:      Effort: Pulmonary effort is normal. No respiratory distress.      Breath sounds: Normal breath sounds.   Abdominal:      General: Bowel sounds are normal. There is no distension.      Palpations: Abdomen is soft.      Tenderness: There is no abdominal tenderness.   Musculoskeletal:         General: No deformity.      Cervical back: Normal range of motion.   Skin:     General: Skin is warm.      Findings: No rash.   Neurological:      Coordination: Coordination is intact.      Deep Tendon Reflexes: Reflexes are normal and symmetric.   Psychiatric:         Speech: Speech normal.         Judgment: Judgment normal.         Procedures    Assessment/Plan: She likely has vestibular dysfunction.  I would like to refer her to the [facility] for vestibular assessment.  We are also going to schedule neuropsych testing.  MRI imaging of the  brain as well as an MRA of the head and neck.  Return to clinic after testing.       Diagnoses and all orders for this visit:    1. Dizziness (Primary)  -     MRI angiogram head w wo contrast; Future  -     MRI Angiogram Neck Without Contrast; Future    2. Cognitive changes  -     MRI Brain With & Without Contrast; Future  -     MRI Angiogram Neck Without Contrast; Future           Yakov Beltran II, MD

## 2025-01-17 NOTE — PROGRESS NOTES
Chief complaint: History of memory change and dizziness.      Patient ID: Dee Watts is a 84 y.o. female.    HPI: I have had the pleasure of seeing your patient today.  As you may know she is an 84-year-old female here for the evaluation and treatment of memory change.  Her son who accompanies her today states that she has had some memory issues for the past couple of years.  Is typically short-term memory issues.  She has been noted to repeat herself often.  She may asked the same question multiple times.  She was having issues maintaining her own medications and with daily orientation.  She is now living in an assisted living facility.  There is no family history of dementia.  She does have a history of dizziness.  The dizziness is described as a movement like sensation.  She says if she looks down or tilts her head she will continue to move.  Typically  will last for several minutes at a time.  She typically does not have focal weakness or numbness with it.  She has had several UTIs previously.  Her son says that after the most recent urinary tract infection last year, she was not able to reach her baseline level of cognition.  No history of severe head trauma.  She does have chronic neck pain with some mild headaches.  MRI imaging back in 2023 showed mild small vessel ischemic change as well as some cortical atrophy.  Her dizziness is essentially daily.  She does have balance issues because of the dizziness.    The following portions of the patient's history were reviewed and updated as appropriate: allergies, current medications, past family history, past medical history, past social history, past surgical history and problem list.    Review of Systems   Constitutional:  Positive for fatigue.   HENT:  Negative for hearing loss.    Eyes:  Positive for pain (pain behind both eyes).   Musculoskeletal:  Positive for gait problem (multiple falls, with injury).   Neurological:  Positive for dizziness, tremors,  weakness and light-headedness.   Psychiatric/Behavioral:  Positive for confusion and decreased concentration.       I have reviewed the review of systems above performed by my medical assistant.      Vitals:    01/17/25 1405   BP: 122/82   Pulse: 89   SpO2: 95%       Neurological Exam  Mental Status  Awake, alert and oriented to person, place and time. Recalls 3 of 3 objects immediately. At 3 minutes recalls 0 of 3 objects. Recalls 0 of 3 objects with prompting. Speech is normal. Language is fluent with no aphasia. Attention and concentration are normal. Fund of knowledge is appropriate for level of education. MMSE score: 26.    Cranial Nerves  CN I: Sense of smell is normal.  CN II: Visual acuity is normal.  CN III, IV, VI: Extraocular movements intact bilaterally. Pupils equal round and reactive to light bilaterally.  CN V: Facial sensation is normal.  CN VII: Full and symmetric facial movement.  CN XI: Shoulder shrug strength is normal.  CN XII: Tongue midline without atrophy or fasciculations.    Motor  Normal muscle bulk throughout. No fasciculations present. Normal muscle tone. No abnormal involuntary movements. No pronator drift.                                             Right                     Left  Rhomboids                            5                          5  Infraspinatus                          5                          5  Supraspinatus                       5                          5  Deltoid                                   5                          5   Biceps                                   5                          5  Brachioradialis                      5                          5   Triceps                                  5                          5   Pronator                                5                          5   Supinator                              5                           5   Wrist flexor                            5                          5   Wrist extensor                        5                          5   Finger flexor                          5                          5   Finger extensor                     5                          5   Interossei                              5                          5   Abductor pollicis brevis         5                          5   Flexor pollicis brevis             5                          5   Opponens pollicis                 5                          5  Extensor digitorum               5                          5  Abductor digiti minimi           5                          5   Abdominal                            5                          5  Glutei                                    5                          5  Hip abductor                         5                          5  Hip adductor                         5                          5   Iliopsoas                               5                          5   Quadriceps                           5                          5   Hamstring                             5                          5   Gastrocnemius                     5                           5   Anterior tibialis                      5                          5   Posterior tibialis                    5                          5   Peroneal                               5                          5  Ankle dorsiflexor                   5                          5  Ankle plantar flexor              5                           5  Extensor hallucis longus      5                           5    Sensory  Sensation is intact to light touch, pinprick, vibration and proprioception in all four extremities.    Reflexes  Deep tendon reflexes are 2+ and symmetric in all four extremities.    Right pathological reflexes: Randi's absent.  Left pathological reflexes: Randi's absent.    Coordination    Finger-to-nose, rapid alternating movements and heel-to-shin normal bilaterally without dysmetria.    Gait  Normal  casual, toe, heel and tandem gait.       Physical Exam  Vitals reviewed.   Constitutional:       General: She is not in acute distress.     Appearance: She is well-developed.   HENT:      Head: Normocephalic and atraumatic.   Eyes:      Extraocular Movements: Extraocular movements intact.      Pupils: Pupils are equal, round, and reactive to light.   Cardiovascular:      Rate and Rhythm: Normal rate and regular rhythm.      Heart sounds: Normal heart sounds.   Pulmonary:      Effort: Pulmonary effort is normal. No respiratory distress.      Breath sounds: Normal breath sounds.   Abdominal:      General: Bowel sounds are normal. There is no distension.      Palpations: Abdomen is soft.      Tenderness: There is no abdominal tenderness.   Musculoskeletal:         General: No deformity.      Cervical back: Normal range of motion.   Skin:     General: Skin is warm.      Findings: No rash.   Neurological:      Coordination: Coordination is intact.      Deep Tendon Reflexes: Reflexes are normal and symmetric.   Psychiatric:         Speech: Speech normal.         Judgment: Judgment normal.         Procedures    Assessment/Plan: She likely has vestibular dysfunction.  I would like to refer her to the [facility] for vestibular assessment.  We are also going to schedule neuropsych testing.  MRI imaging of the brain as well as an MRA of the head and neck.  Return to clinic after testing.       Diagnoses and all orders for this visit:    1. Dizziness (Primary)  -     MRI angiogram head w wo contrast; Future  -     MRI Angiogram Neck Without Contrast; Future    2. Cognitive changes  -     MRI Brain With & Without Contrast; Future  -     MRI Angiogram Neck Without Contrast; Future           Yakov Beltran II, MD

## 2025-01-20 ENCOUNTER — PATIENT ROUNDING (BHMG ONLY) (OUTPATIENT)
Dept: NEUROLOGY | Facility: CLINIC | Age: 85
End: 2025-01-20
Payer: MEDICARE

## 2025-01-24 ENCOUNTER — TELEPHONE (OUTPATIENT)
Dept: NEUROLOGY | Facility: CLINIC | Age: 85
End: 2025-01-24
Payer: MEDICARE

## 2025-01-24 NOTE — TELEPHONE ENCOUNTER
Caller: Myron Watts    Relationship to patient: Emergency Contact    Best call back number: [phone]     Chief complaint: NEUROPSYCH TESTING     Type of visit: FOLLOW UP    Requested date: AFTER 2/21/25     If rescheduling, when is the original appointment: N/A     Additional notes:PT IS SCHEDULED FOR THE ASSESSMENT ON 2/21/25 AND HE IS TRYING TO SCHEDULE A FOLLOW UP. THE NEXT AVAILABLE IN THE SYSTEM IS NOT UNTIL JULY 30TH.     PLEASE ADVISE  THANK YOU

## 2025-02-14 ENCOUNTER — HOSPITAL ENCOUNTER (OUTPATIENT)
Dept: MRI IMAGING | Facility: HOSPITAL | Age: 85
Discharge: HOME | End: 2025-02-14
Payer: MEDICARE

## 2025-02-14 DIAGNOSIS — R41.89 COGNITIVE CHANGES: ICD-10-CM

## 2025-02-14 DIAGNOSIS — R42 DIZZINESS: ICD-10-CM

## 2025-02-14 PROCEDURE — 70547 MR ANGIOGRAPHY NECK W/O DYE: CPT

## 2025-02-18 ENCOUNTER — HOSPITAL ENCOUNTER (OUTPATIENT)
Dept: MRI IMAGING | Facility: HOSPITAL | Age: 85
Discharge: HOME | End: 2025-02-18
Payer: MEDICARE

## 2025-02-18 DIAGNOSIS — R41.89 COGNITIVE CHANGES: ICD-10-CM

## 2025-02-18 DIAGNOSIS — R42 DIZZINESS: ICD-10-CM

## 2025-02-18 PROCEDURE — A9577 INJ MULTIHANCE: HCPCS | Performed by: PSYCHIATRY & NEUROLOGY

## 2025-02-18 PROCEDURE — 70544 MR ANGIOGRAPHY HEAD W/O DYE: CPT

## 2025-02-18 PROCEDURE — 25510000002 GADOBENATE DIMEGLUMINE 529 MG/ML SOLUTION: Performed by: PSYCHIATRY & NEUROLOGY

## 2025-02-18 PROCEDURE — 70553 MRI BRAIN STEM W/O & W/DYE: CPT

## 2025-02-18 RX ADMIN — GADOBENATE DIMEGLUMINE 18 ML: 529 INJECTION, SOLUTION INTRAVENOUS at 13:35

## 2025-04-10 ENCOUNTER — OFFICE VISIT (OUTPATIENT)
Dept: NEUROLOGY | Facility: CLINIC | Age: 85
End: 2025-04-10
Payer: MEDICARE

## 2025-04-10 VITALS
HEIGHT: 60 IN | DIASTOLIC BLOOD PRESSURE: 86 MMHG | HEART RATE: 92 BPM | SYSTOLIC BLOOD PRESSURE: 134 MMHG | OXYGEN SATURATION: 95 % | WEIGHT: 190 LBS | BODY MASS INDEX: 37.3 KG/M2

## 2025-04-10 DIAGNOSIS — R42 DIZZINESS: Primary | ICD-10-CM

## 2025-04-10 DIAGNOSIS — R41.89 COGNITIVE IMPAIRMENT: ICD-10-CM

## 2025-04-10 DIAGNOSIS — M54.81 BILATERAL OCCIPITAL NEURALGIA: ICD-10-CM

## 2025-04-10 RX ORDER — DONEPEZIL HYDROCHLORIDE 10 MG/1
10 TABLET, FILM COATED ORAL DAILY
Qty: 90 TABLET | Refills: 4 | Status: SHIPPED | OUTPATIENT
Start: 2025-04-10 | End: 2025-07-09

## 2025-04-10 RX ORDER — DONEPEZIL HYDROCHLORIDE 5 MG/1
5 TABLET, FILM COATED ORAL DAILY
Qty: 21 TABLET | Refills: 0 | Status: SHIPPED | OUTPATIENT
Start: 2025-04-10 | End: 2025-05-01

## 2025-04-10 NOTE — PROGRESS NOTES
Chief Complaint   Patient presents with    Dizziness    Cognitive Impairment       Patient ID: Dee Watts is a 84 y.o. female.    HPI: I had the pleasure of seeing your patient again today.  As you may know she is an 84-year-old female here for the management of cognitive impairment.  She continues to have some cognitive change.  She recently had a slums test that was 14 a few weeks ago.  We did schedule her for MRI of the brain as well as MRA of the head and neck.  MRI of the brain did show evidence for small vessel ischemic change.  She also has headaches.  She states that her headaches originate in the back of her head on the the right mostly however it does occur on the left as well.  These headaches occur on a fairly regular basis.  She does have a history of scoliosis and chronic neck pain.  No hallucinations.  No interval development of resting tremor.  No recent falls.  She also continues to have dizziness.  We discussed potentially having her see someone previously however we held off at this time.  She has dizziness on a daily basis.  It typically is dependent upon position.    The following portions of the patient's history were reviewed and updated as appropriate: allergies, current medications, past family history, past medical history, past social history, past surgical history and problem list.    Review of Systems   Eyes:  Positive for pain (right eye during HA and w/o HA).   Musculoskeletal:  Positive for gait problem.   Neurological:  Positive for dizziness, tremors, weakness and headaches. Negative for seizures, syncope, facial asymmetry, speech difficulty, light-headedness and numbness.   Psychiatric/Behavioral:  Positive for agitation, behavioral problems, confusion, decreased concentration, dysphoric mood and hallucinations (occasionally visual). Negative for self-injury, sleep disturbance and suicidal ideas. The patient is not nervous/anxious and is not hyperactive.       I have reviewed  the review of systems above performed by my medical assistant.      Vitals:    04/10/25 1156   BP: 134/86   Pulse: 92   SpO2: 95%       Neurological Exam  Mental Status  Awake, alert and oriented to person, place and time. Recent and remote memory are intact. Speech is normal. Language is fluent with no aphasia. Attention and concentration are normal. Fund of knowledge is appropriate for level of education.    Cranial Nerves  CN I: Sense of smell is normal.  CN II: Visual acuity is normal.  CN III, IV, VI: Extraocular movements intact bilaterally. Pupils equal round and reactive to light bilaterally.  CN V: Facial sensation is normal.  CN VII: Full and symmetric facial movement.  CN XI: Shoulder shrug strength is normal.  CN XII: Tongue midline without atrophy or fasciculations.    Motor  Normal muscle bulk throughout. No fasciculations present. Normal muscle tone. The following abnormal movements were seen: Essential tremor in both upper extremity.   No pronator drift.                                             Right                     Left  Rhomboids                            5                          5  Infraspinatus                          5                          5  Supraspinatus                       5                          5  Deltoid                                   5                          5   Biceps                                   5                          5  Brachioradialis                      5                          5   Triceps                                  5                          5   Pronator                                5                          5   Supinator                              5                           5   Wrist flexor                            5                          5   Wrist extensor                       5                          5   Finger flexor                          5                          5   Finger extensor                     5                           5   Interossei                              5                          5   Abductor pollicis brevis         5                          5   Flexor pollicis brevis             5                          5   Opponens pollicis                 5                          5  Extensor digitorum               5                          5  Abductor digiti minimi           5                          5   Abdominal                            5                          5  Glutei                                    5                          5  Hip abductor                         5                          5  Hip adductor                         5                          5   Iliopsoas                               5                          5   Quadriceps                           5                          5   Hamstring                             5                          5   Gastrocnemius                     5                           5   Anterior tibialis                      5                          5   Posterior tibialis                    5                          5   Peroneal                               5                          5  Ankle dorsiflexor                   5                          5  Ankle plantar flexor              5                           5  Extensor hallucis longus      5                           5    Sensory  Sensation is intact to light touch, pinprick, vibration and proprioception in all four extremities.    Reflexes  Deep tendon reflexes are 2+ and symmetric in all four extremities.    Right pathological reflexes: Randi's absent.  Left pathological reflexes: Randi's absent.    Coordination    Finger-to-nose, rapid alternating movements and heel-to-shin normal bilaterally without dysmetria.    Gait    Gait not tested due to the presence of significant orthopedic pain.       Physical Exam  Vitals reviewed.   Constitutional:       Appearance: She is well-developed.   HENT:       Head: Normocephalic and atraumatic.   Eyes:      Extraocular Movements: Extraocular movements intact.      Pupils: Pupils are equal, round, and reactive to light.   Cardiovascular:      Rate and Rhythm: Normal rate and regular rhythm.   Pulmonary:      Breath sounds: Normal breath sounds.   Musculoskeletal:         General: Normal range of motion.   Skin:     General: Skin is warm.   Neurological:      Coordination: Coordination is intact.      Deep Tendon Reflexes: Reflexes are normal and symmetric.   Psychiatric:         Speech: Speech normal.         Procedures    Assessment/Plan: We are going to continue the referral for neuropsych testing.  For her dizziness had like to refer her to the [facility].  We are going to schedule her for occipital nerve block.  She will start the donepezil to 10 mg daily.  Will see her back in the clinic for her nerve block soon.  A total of 60 minutes was spent face-to-face with the patient today.  Of that greater than 50% of this time was spent discussing signs and symptoms of dizziness, cognitive impairment, occipital neuralgia, patient education, plan of care and prognosis.         Diagnoses and all orders for this visit:    1. Dizziness (Primary)  -     Basic Vestibular Evaluation; Future    2. Cognitive impairment  -     donepezil (ARICEPT) 5 MG tablet; Take 1 tablet by mouth Daily for 21 days.  Dispense: 21 tablet; Refill: 0  -     donepezil (Aricept) 10 MG tablet; Take 1 tablet by mouth Daily for 90 days.  Dispense: 90 tablet; Refill: 4    3. Bilateral occipital neuralgia           Yakov Beltran II, MD

## 2025-05-01 ENCOUNTER — PROCEDURE VISIT (OUTPATIENT)
Dept: NEUROLOGY | Facility: CLINIC | Age: 85
End: 2025-05-01
Payer: MEDICARE

## 2025-05-01 VITALS — BODY MASS INDEX: 37.11 KG/M2 | HEIGHT: 60 IN

## 2025-05-01 DIAGNOSIS — M54.81 BILATERAL OCCIPITAL NEURALGIA: Primary | ICD-10-CM

## 2025-05-01 RX ORDER — LIDOCAINE HYDROCHLORIDE 20 MG/ML
1 INJECTION, SOLUTION INFILTRATION; PERINEURAL ONCE
Status: COMPLETED | OUTPATIENT
Start: 2025-05-01 | End: 2025-05-01

## 2025-05-01 RX ORDER — METHYLPREDNISOLONE ACETATE 40 MG/ML
40 INJECTION, SUSPENSION INTRA-ARTICULAR; INTRALESIONAL; INTRAMUSCULAR; SOFT TISSUE ONCE
Status: COMPLETED | OUTPATIENT
Start: 2025-05-01 | End: 2025-05-01

## 2025-05-01 RX ADMIN — METHYLPREDNISOLONE ACETATE 40 MG: 40 INJECTION, SUSPENSION INTRA-ARTICULAR; INTRALESIONAL; INTRAMUSCULAR; SOFT TISSUE at 17:43

## 2025-05-01 RX ADMIN — LIDOCAINE HYDROCHLORIDE 1 ML: 20 INJECTION, SOLUTION INFILTRATION; PERINEURAL at 17:42

## 2025-05-01 NOTE — PROGRESS NOTES
"Procedure   Nerve Block    Date/Time: 5/1/2025 3:10 PM    Performed by: Yakov Beltran II, MD  Authorized by: Yakov Beltran II, MD  Consent: Verbal consent obtained. Written consent obtained  Risks and benefits: risks, benefits and alternatives were discussed  Consent given by: patient  Patient understanding: patient states understanding of the procedure being performed  Patient consent: the patient's understanding of the procedure matches consent given  Procedure consent: procedure consent matches procedure scheduled  Required items: required blood products, implants, devices, and special equipment available  Patient identity confirmed: verbally with patient and provided demographic data  Time out: Immediately prior to procedure a \"time out\" was called to verify the correct patient, procedure, equipment, support staff and site/side marked as required.  Indications comments: occipital neuralgia  Body area: head  Nerve: greater occipital  Laterality: bilateral.    Sedation:  Patient sedated: no    Patient position: sitting  Needle size: 25 G  Location technique: anatomical landmarks  Local Anesthetic: lidocaine 2% without epinephrine  Patient tolerance: Patient tolerated the procedure well with no immediate complications  Comments: 1 mL of both Depo-Medrol and 2% lidocaine without epinephrine were drawn into two 3 mL syringes. 1 mL of this mixture was injected at the site of the greater occipital nerve, bilaterally, in a fan-shaped distribution.       ONB Bilateral  "

## 2025-05-09 ENCOUNTER — EXTERNAL PBMM DATA (OUTPATIENT)
Dept: PHARMACY | Facility: OTHER | Age: 85
End: 2025-05-09
Payer: MEDICARE

## 2025-06-03 ENCOUNTER — EXTERNAL PBMM DATA (OUTPATIENT)
Dept: PHARMACY | Facility: OTHER | Age: 85
End: 2025-06-03
Payer: MEDICARE

## 2025-08-26 ENCOUNTER — OFFICE (OUTPATIENT)
Dept: URBAN - METROPOLITAN AREA CLINIC 76 | Facility: CLINIC | Age: 85
End: 2025-08-26
Payer: MEDICARE

## 2025-08-26 VITALS
HEIGHT: 60 IN | DIASTOLIC BLOOD PRESSURE: 88 MMHG | SYSTOLIC BLOOD PRESSURE: 132 MMHG | OXYGEN SATURATION: 95 % | WEIGHT: 190 LBS | HEART RATE: 111 BPM

## 2025-08-26 DIAGNOSIS — K59.00 CONSTIPATION, UNSPECIFIED: ICD-10-CM

## 2025-08-26 DIAGNOSIS — K21.9 GASTRO-ESOPHAGEAL REFLUX DISEASE WITHOUT ESOPHAGITIS: ICD-10-CM

## 2025-08-26 PROCEDURE — 99204 OFFICE O/P NEW MOD 45 MIN: CPT | Performed by: NURSE PRACTITIONER

## 2025-08-26 RX ORDER — OMEPRAZOLE 40 MG/1
40 CAPSULE, DELAYED RELEASE ORAL
Qty: 30 | Refills: 11 | Status: ACTIVE
Start: 2025-08-26

## 2025-08-26 RX ORDER — POLYETHYLENE GLYCOL 3350 17 G/17G
POWDER, FOR SOLUTION ORAL
Qty: 3 | Refills: 0 | Status: ACTIVE
Start: 2025-08-26